# Patient Record
Sex: FEMALE | Race: WHITE | Employment: OTHER | URBAN - METROPOLITAN AREA
[De-identification: names, ages, dates, MRNs, and addresses within clinical notes are randomized per-mention and may not be internally consistent; named-entity substitution may affect disease eponyms.]

---

## 2017-04-25 ENCOUNTER — OFFICE VISIT (OUTPATIENT)
Dept: ORTHOPEDIC SURGERY | Age: 67
End: 2017-04-25

## 2017-04-25 VITALS
HEIGHT: 65 IN | WEIGHT: 125 LBS | DIASTOLIC BLOOD PRESSURE: 81 MMHG | BODY MASS INDEX: 20.83 KG/M2 | HEART RATE: 83 BPM | SYSTOLIC BLOOD PRESSURE: 149 MMHG

## 2017-04-25 DIAGNOSIS — M17.12 LOCALIZED OSTEOARTHRITIS OF LEFT KNEE: Primary | ICD-10-CM

## 2017-04-25 PROCEDURE — 20610 DRAIN/INJ JOINT/BURSA W/O US: CPT | Performed by: ORTHOPAEDIC SURGERY

## 2017-04-25 PROCEDURE — 3017F COLORECTAL CA SCREEN DOC REV: CPT | Performed by: ORTHOPAEDIC SURGERY

## 2017-04-25 PROCEDURE — 4040F PNEUMOC VAC/ADMIN/RCVD: CPT | Performed by: ORTHOPAEDIC SURGERY

## 2017-04-25 PROCEDURE — 1123F ACP DISCUSS/DSCN MKR DOCD: CPT | Performed by: ORTHOPAEDIC SURGERY

## 2017-04-25 PROCEDURE — 73564 X-RAY EXAM KNEE 4 OR MORE: CPT | Performed by: ORTHOPAEDIC SURGERY

## 2017-04-25 PROCEDURE — 3014F SCREEN MAMMO DOC REV: CPT | Performed by: ORTHOPAEDIC SURGERY

## 2017-04-25 PROCEDURE — G8400 PT W/DXA NO RESULTS DOC: HCPCS | Performed by: ORTHOPAEDIC SURGERY

## 2017-04-25 PROCEDURE — G8427 DOCREV CUR MEDS BY ELIG CLIN: HCPCS | Performed by: ORTHOPAEDIC SURGERY

## 2017-04-25 PROCEDURE — 99212 OFFICE O/P EST SF 10 MIN: CPT | Performed by: ORTHOPAEDIC SURGERY

## 2017-04-25 PROCEDURE — 1090F PRES/ABSN URINE INCON ASSESS: CPT | Performed by: ORTHOPAEDIC SURGERY

## 2017-04-25 PROCEDURE — G8419 CALC BMI OUT NRM PARAM NOF/U: HCPCS | Performed by: ORTHOPAEDIC SURGERY

## 2017-04-25 PROCEDURE — 1036F TOBACCO NON-USER: CPT | Performed by: ORTHOPAEDIC SURGERY

## 2018-06-18 ENCOUNTER — OFFICE VISIT (OUTPATIENT)
Dept: ORTHOPEDIC SURGERY | Age: 68
End: 2018-06-18

## 2018-06-18 VITALS
WEIGHT: 125 LBS | SYSTOLIC BLOOD PRESSURE: 126 MMHG | HEIGHT: 65 IN | BODY MASS INDEX: 20.83 KG/M2 | DIASTOLIC BLOOD PRESSURE: 86 MMHG | HEART RATE: 84 BPM

## 2018-06-18 DIAGNOSIS — M25.562 LEFT KNEE PAIN, UNSPECIFIED CHRONICITY: ICD-10-CM

## 2018-06-18 DIAGNOSIS — M17.12 PRIMARY OSTEOARTHRITIS OF LEFT KNEE: Primary | ICD-10-CM

## 2018-06-18 PROCEDURE — 99213 OFFICE O/P EST LOW 20 MIN: CPT | Performed by: ORTHOPAEDIC SURGERY

## 2018-06-18 PROCEDURE — 1123F ACP DISCUSS/DSCN MKR DOCD: CPT | Performed by: ORTHOPAEDIC SURGERY

## 2018-06-18 PROCEDURE — G8420 CALC BMI NORM PARAMETERS: HCPCS | Performed by: ORTHOPAEDIC SURGERY

## 2018-06-18 PROCEDURE — 3017F COLORECTAL CA SCREEN DOC REV: CPT | Performed by: ORTHOPAEDIC SURGERY

## 2018-06-18 PROCEDURE — 1036F TOBACCO NON-USER: CPT | Performed by: ORTHOPAEDIC SURGERY

## 2018-06-18 PROCEDURE — 1090F PRES/ABSN URINE INCON ASSESS: CPT | Performed by: ORTHOPAEDIC SURGERY

## 2018-06-18 PROCEDURE — 20610 DRAIN/INJ JOINT/BURSA W/O US: CPT | Performed by: ORTHOPAEDIC SURGERY

## 2018-06-18 PROCEDURE — G8427 DOCREV CUR MEDS BY ELIG CLIN: HCPCS | Performed by: ORTHOPAEDIC SURGERY

## 2018-06-18 PROCEDURE — APPNB15 APP NON BILLABLE TIME 0-15 MINS: Performed by: ORTHOPAEDIC SURGERY

## 2018-06-18 PROCEDURE — G8400 PT W/DXA NO RESULTS DOC: HCPCS | Performed by: ORTHOPAEDIC SURGERY

## 2018-06-18 PROCEDURE — 4040F PNEUMOC VAC/ADMIN/RCVD: CPT | Performed by: ORTHOPAEDIC SURGERY

## 2018-06-20 ENCOUNTER — TELEPHONE (OUTPATIENT)
Dept: ORTHOPEDIC SURGERY | Age: 68
End: 2018-06-20

## 2018-10-09 ENCOUNTER — OFFICE VISIT (OUTPATIENT)
Dept: ORTHOPEDIC SURGERY | Age: 68
End: 2018-10-09
Payer: MEDICARE

## 2018-10-09 VITALS
HEIGHT: 65 IN | DIASTOLIC BLOOD PRESSURE: 78 MMHG | WEIGHT: 125 LBS | SYSTOLIC BLOOD PRESSURE: 146 MMHG | HEART RATE: 101 BPM | BODY MASS INDEX: 20.83 KG/M2

## 2018-10-09 DIAGNOSIS — M17.12 PRIMARY OSTEOARTHRITIS OF LEFT KNEE: Primary | ICD-10-CM

## 2018-10-09 PROCEDURE — 4040F PNEUMOC VAC/ADMIN/RCVD: CPT | Performed by: ORTHOPAEDIC SURGERY

## 2018-10-09 PROCEDURE — 1036F TOBACCO NON-USER: CPT | Performed by: ORTHOPAEDIC SURGERY

## 2018-10-09 PROCEDURE — G8420 CALC BMI NORM PARAMETERS: HCPCS | Performed by: ORTHOPAEDIC SURGERY

## 2018-10-09 PROCEDURE — 1101F PT FALLS ASSESS-DOCD LE1/YR: CPT | Performed by: ORTHOPAEDIC SURGERY

## 2018-10-09 PROCEDURE — G8400 PT W/DXA NO RESULTS DOC: HCPCS | Performed by: ORTHOPAEDIC SURGERY

## 2018-10-09 PROCEDURE — 3017F COLORECTAL CA SCREEN DOC REV: CPT | Performed by: ORTHOPAEDIC SURGERY

## 2018-10-09 PROCEDURE — G8427 DOCREV CUR MEDS BY ELIG CLIN: HCPCS | Performed by: ORTHOPAEDIC SURGERY

## 2018-10-09 PROCEDURE — G8484 FLU IMMUNIZE NO ADMIN: HCPCS | Performed by: ORTHOPAEDIC SURGERY

## 2018-10-09 PROCEDURE — 1123F ACP DISCUSS/DSCN MKR DOCD: CPT | Performed by: ORTHOPAEDIC SURGERY

## 2018-10-09 PROCEDURE — 99212 OFFICE O/P EST SF 10 MIN: CPT | Performed by: ORTHOPAEDIC SURGERY

## 2018-10-09 PROCEDURE — 1090F PRES/ABSN URINE INCON ASSESS: CPT | Performed by: ORTHOPAEDIC SURGERY

## 2018-11-05 NOTE — PROGRESS NOTES
Douglas Ortiz  [id number]  Encounter Date: 10/9/2018  YOB: 1950    Chief Complaint   Patient presents with    Knee Pain     f/u for LT knee pain/osteoarthritis        History:Ms. Douglas Ortiz is here in follow up regarding her left knee arthritis. She last underwent a Synvisc injection in June of this year. Pain is started return and can increase up to an 8/10 with fleeting sharp pain with activities. More to the posterior medial aspect of the knee with flexion. She denies any catching or locking symptoms. Exam:  BP (!) 146/78   Pulse 101   Ht 5' 5\" (1.651 m)   Wt 125 lb (56.7 kg)   BMI 20.80 kg/m²   General: Alert and oriented x3, No acute distress, Cooperative and conversant  On examination of patient's left knee there is no obvious swelling or joint effusion. She has mild tenderness on palpation of both the medial and lateral joint lines and with lateral tracking of the patella. There is mild patellofemoral crepitus. She does not lack range of motion. She has good strength. Gross motor function light touch sensation intact throughout her left lower extremity. There is no edema or erythema in the affected extremity. Assessment:   Diagnosis Orders   1. Primary osteoarthritis of left knee         Plan:  Again she has underlying arthritic changes and is status post arthroscopy with partial meniscectomy from November 2015. She continues to remain active working out 3-5 times per week. We discussed use of combination anti-inflammatory and pain cream to help bridge her symptoms until she could repeat her joint fluid injections in December. She will give this a try. Otherwise she'll continue to titrate her activities as she tolerates. She will follow back as needed. She understands and accepts this course of care.

## 2020-11-17 NOTE — PROGRESS NOTES
[facility] / [facility] ([facility]) [address], [place]    Acknowledgment of Informed Consent for Surgical or Medical Procedure and Sedation  I agree to allow doctor(s) [facility] and his/her associates or assistants, including residents and/or other qualified medical practitioner to perform the following medical treatment or procedure and to administer or direct the administration of sedation as necessary:  Procedure(s): RIGHT L4-5 LATERAL LUMBAR INTERBODY FUSION WITH PEDICLE SCREW FIXATION  My doctor has explained the following regarding the proposed procedure:   the explanation of the procedure   the benefits of the procedure   the potential problems that might occur during recuperation   the risks and side effects of the procedure which could include but are not limited to severe blood loss, infection, stroke or death   the benefits, risks and side effect of alternative procedures including the consequences of declining this procedure or any alternative procedures   the likelihood of achieving satisfactory results. I acknowledge no guarantee or assurance has been made to me regarding the results. I understand that during the course of this treatment/procedure, unforeseen conditions can occur which require an additional or different procedure. I agree to allow my physician or assistants to perform such extension of the original procedure as they may find necessary. I understand that sedation will often result in temporary impairment of memory and fine motor skills and that sedation can occasionally progress to a state of deep sedation or general anesthesia. I understand the risks of anesthesia for surgery include, but are not limited to, sore throat, hoarseness, injury to face, mouth, or teeth; nausea; headache; injury to blood vessels or nerves; death, brain damage, or paralysis.     I understand that if I have a Limitation of Treatment order in effect during my hospitalization, the order may or may not be in effect during this procedure. I give my doctor permission to give me blood or blood products. I understand that there are risks with receiving blood such as hepatitis, AIDS, fever, or allergic reaction. I acknowledge that the risks, benefits, and alternatives of this treatment have been explained to me and that no express or implied warranty has been given by the hospital, any blood bank, or any person or entity as to the blood or blood components transfused. At the discretion of my doctor, I agree to allow observers, equipment/product representatives and allow photographing, and/or televising of the procedure, provided my name or identity is maintained confidentially. I agree the hospital may dispose of or use for scientific or educational purposes any tissue, fluid, or body parts which may be removed.     ________________________________Date________Time______ am/pm  ([place] [facility])  Patient or Signature of Closest Relative or Legal Guardian    ________________________________Date________Time______am/pm      Page 1 of  1  Witness

## 2020-11-18 ENCOUNTER — NURSE ONLY (OUTPATIENT)
Dept: PRIMARY CARE CLINIC | Age: 70
End: 2020-11-18
Payer: MEDICARE

## 2020-11-18 LAB — SARS-COV-2: NOT DETECTED

## 2020-11-18 PROCEDURE — 99211 OFF/OP EST MAY X REQ PHY/QHP: CPT | Performed by: NURSE PRACTITIONER

## 2020-11-20 LAB — CULTURE NOSE: NORMAL

## 2020-11-20 NOTE — PROGRESS NOTES
Discharge planning:  After discharged from hospital patient will return home with , Aliciahailee Spencer.

## 2020-11-20 NOTE — PROGRESS NOTES
[facility] patients having surgery or anesthesia are required to be Covid tested. You will need to quarantined from the time you are tested until your surgery. PRIOR TO PROCEDURE DATE:  1. Please follow any guidelines/instructions prior to your procedure as advised by your surgeon. 2. Arrange for someone to drive you home and be with you for the first 24 hours after discharge for your safety after your procedure for which you received sedation. Ensure it is someone we can share information with regarding your discharge. 3. You must contact your surgeon for instructions IF:   You are taking any blood thinners, aspirin, anti-inflammatory or vitamin E.   There is a change in your physical condition such as a cold, fever, rash, cuts, sores or any other infection, especially near your surgical site. 4. Do not drink alcohol the day before or day of your procedure. 5. A Pre-op History and Physical for surgery MUST be completed by your Physician or Urgent Care within 30 days of your procedure date. Please bring a copy with you on the day of your procedure and along with any other testing performed. THE DAY OF YOUR PROCEDURE:  1. Follow instructions for ARRIVAL TIME as DIRECTED BY YOUR SURGEON. I    2. Enter the MAIN entrance from [employer] and follow the signs to the free [facility] or [employer] parking (offered free of charge 6am-5pm). 3. Enter the Main Entrance of the hospital (do not enter from the lower level of the parking garage). Upon entrance, check in with the  at the main desk on your left. If no one is available at the desk, proceed into the [facility] Waiting Room and go through the door directly into the [facility]. There is a Check-in desk ACROSS from Room 5 (marked with a sign hanging from the ceiling). The phone number for the surgery center is [phone].     4. Please call [phone] option #2 option #2 if you have not been preregistered yet. On the day of your procedure bring your insurance card and photo ID. You will be registered at your bedside once brought back to your room. 5. DO NOT EAT ANYTHING eight hours prior to surgery. May have 8 ounces of water 4 hours prior to surgery. 6. MEDICATIONS    Take the following medications with a SMALL sip of water: tylenol    Use your usual dose of inhalers the morning of surgery. BRING your rescue inhaler with you to hospital.    Anesthesia does NOT want you to take insulin the morning of surgery. They will control your blood sugar while you are at the hospital. Please contact your ordering physician for instructions regarding your insulin the night before your procedure. If you have an insulin pump, please keep it set on basal rate. 7. Do not swallow water when brushing teeth. No gum, candy, mints or ice chips. Refrain from smoking or at least decrease the amount. 8. Dress in loose, comfortable clothing appropriate for redressing after your procedure. Do not wear jewelry (including body piercings), make-up (especially NO eye make-up), fingernail polish (NO toenail polish if foot/leg surgery), lotion, powders or metal hairclips. 9. Dentures, glasses, or contacts will need to be removed before your procedure. Bring cases for your glasses, contacts, dentures, or hearing aids to protect them while you are in surgery. 10. If you use a CPAP, please bring it with you on the day of your procedure. 11. We recommend that valuable personal  belongings such as cash, cell phones, e-tablets or jewelry, be left at home during your stay. The hospital will not be responsible for valuables that are not secured in the hospital safe. However, if your insurance requires a co-pay, you may want to bring a method of payment, i.e. Check or credit card, if you wish to pay your co-pay the day of surgery.       12. If you are to stay overnight, you may bring a bag with personal items. Please have any large items you may need brought in by your family after your arrival to your hospital room. 15. If you have a Living Will or Durable Power of , please bring a copy on the day of your procedure. 15. With your permission, one family member may accompany you while you are being prepared for surgery. Once you are ready, additional family members may join you. HOW WE KEEP YOU SAFE and WORK TO PREVENT SURGICAL SITE INFECTIONS:  1. Health care workers should always check your ID bracelet to verify your name and birth date. You will be asked many times to state your name, date of birth, and allergies. 2. Health care workers should always clean their hands with soap or alcohol gel before providing care to you. It is okay to ask anyone if they cleaned their hands before they touch you. 3. You will be actively involved in verifying the type of procedure you are having and ensuring the correct surgical site. This will be confirmed multiple times prior to your procedure. Do NOT maci your surgery site UNLESS instructed to by your surgeon. 4. Do not shave or wax for 72 hours prior to procedure near your operative site. Shaving with a razor can irritate your skin and make it easier to develop an infection. On the day of your procedure, any hair that needs to be removed near the surgical site will be clipped by a healthcare worker using a special clippers designed to avoid skin irritation. 5. When you are in the operating room, your surgical site will be cleansed with a special soap, and in most cases, you will be given an antibiotic before the surgery begins. What to expect AFTER YOUR PROCEDURE:  1. Immediately following your procedure, your will be taken to the PACU for the first phase of your recovery.   Your nurse will help you recover from any potential side effects of anesthesia, such as extreme drowsiness, changes in your vital signs or breathing patterns. Nausea, headache, muscle aches, or sore throat may also occur after anesthesia. Your nurse will help you manage these potential side effects. 2. For comfort and safety, arrange to have someone at home with you for the first 24 hours after discharge. 3. You and your family will be given written instructions about your diet, activity, dressing care, medications, and return visits. 4. Once at home, should issues with nausea, pain, or bleeding occur, or should you notice any signs of infection, you should call your surgeon. 5. Always clean your hands before and after caring for your wound. Do not let your family touch your surgery site without cleaning their hands. 6. Narcotic pain medications can cause significant constipation. You may want to add a stool softener to your postoperative medication schedule or speak to your surgeon on how best to manage this SIDE EFFECT. SPECIAL INSTRUCTIONS please call surgeon's office for arrival time and surgery time. Informed no one can be with her while preparing for surgery, may wait in waiting room however limited space and may be asked to wait in the car. Reviewed current restricted visitation with patient 1 hour 1 person can visit once in room but then no visitors thereafter. May bring small bag with personal items. Patient was not given a brace to bring in. Patient acknowledges understanding of instructions. Thank you for allowing us to care for you. We strive to exceed your expectations in the delivery of care and service provided to you and your family. If you need to contact us for any reason, please call us at [phone]    Instructions reviewed with patient during preadmission testing phone interview. Inna Pradhan. 11/20/2020 .12:40 PM      ADDITIONAL EDUCATIONAL INFORMATION REVIEWED PER PHONE WITH YOU AND/OR YOUR FAMILY:  No Bring a urine sample on day of surgery  Yes Hibiclens®

## 2020-11-20 NOTE — PROGRESS NOTES
Ekg tracing requested from Dr. Darrius Mendoza office spoke with [facility] and our fax number was given.

## 2020-11-23 ENCOUNTER — ANESTHESIA EVENT (OUTPATIENT)
Dept: OPERATING ROOM | Age: 70
DRG: 455 | End: 2020-11-23
Payer: MEDICARE

## 2020-11-24 ENCOUNTER — HOSPITAL ENCOUNTER (INPATIENT)
Age: 70
LOS: 3 days | Discharge: HOME | DRG: 455 | End: 2020-11-27
Attending: NEUROLOGICAL SURGERY | Admitting: NEUROLOGICAL SURGERY
Payer: MEDICARE

## 2020-11-24 ENCOUNTER — APPOINTMENT (OUTPATIENT)
Dept: GENERAL RADIOLOGY | Age: 70
DRG: 455 | End: 2020-11-24
Attending: NEUROLOGICAL SURGERY
Payer: MEDICARE

## 2020-11-24 ENCOUNTER — ANESTHESIA (OUTPATIENT)
Dept: OPERATING ROOM | Age: 70
DRG: 455 | End: 2020-11-24
Payer: MEDICARE

## 2020-11-24 VITALS — SYSTOLIC BLOOD PRESSURE: 94 MMHG | DIASTOLIC BLOOD PRESSURE: 52 MMHG | OXYGEN SATURATION: 100 % | TEMPERATURE: 94.6 F

## 2020-11-24 PROBLEM — M43.16 SPONDYLOLISTHESIS OF LUMBAR REGION: Status: ACTIVE | Noted: 2020-11-24

## 2020-11-24 LAB
ABO/RH: NORMAL
ANTIBODY SCREEN: NORMAL
APTT: 33.9 SEC (ref 24.2–36.2)
BASOPHILS ABSOLUTE: 0 K/UL (ref 0–0.2)
BASOPHILS RELATIVE PERCENT: 0.3 %
EOSINOPHILS ABSOLUTE: 0 K/UL (ref 0–0.6)
EOSINOPHILS RELATIVE PERCENT: 0.5 %
GLUCOSE BLD-MCNC: 95 MG/DL (ref 70–99)
HCT VFR BLD CALC: 44.9 % (ref 36–48)
HEMOGLOBIN: 15 G/DL (ref 12–16)
INR BLD: 0.94 (ref 0.86–1.14)
LYMPHOCYTES ABSOLUTE: 1.3 K/UL (ref 1–5.1)
LYMPHOCYTES RELATIVE PERCENT: 20.9 %
MCH RBC QN AUTO: 30.5 PG (ref 26–34)
MCHC RBC AUTO-ENTMCNC: 33.4 G/DL (ref 31–36)
MCV RBC AUTO: 91.4 FL (ref 80–100)
MONOCYTES ABSOLUTE: 0.4 K/UL (ref 0–1.3)
MONOCYTES RELATIVE PERCENT: 6 %
NEUTROPHILS ABSOLUTE: 4.4 K/UL (ref 1.7–7.7)
NEUTROPHILS RELATIVE PERCENT: 72.3 %
PDW BLD-RTO: 12.8 % (ref 12.4–15.4)
PERFORMED ON: NORMAL
PLATELET # BLD: 212 K/UL (ref 135–450)
PMV BLD AUTO: 7.2 FL (ref 5–10.5)
PROTHROMBIN TIME: 10.9 SEC (ref 10–13.2)
RBC # BLD: 4.92 M/UL (ref 4–5.2)
WBC # BLD: 6.1 K/UL (ref 4–11)

## 2020-11-24 PROCEDURE — 3600000014 HC SURGERY LEVEL 4 ADDTL 15MIN: Performed by: NEUROLOGICAL SURGERY

## 2020-11-24 PROCEDURE — 3209999900 FLUORO FOR SURGICAL PROCEDURES

## 2020-11-24 PROCEDURE — 7100000000 HC PACU RECOVERY - FIRST 15 MIN: Performed by: NEUROLOGICAL SURGERY

## 2020-11-24 PROCEDURE — 2580000003 HC RX 258: Performed by: ANESTHESIOLOGY

## 2020-11-24 PROCEDURE — 2500000003 HC RX 250 WO HCPCS: Performed by: NURSE ANESTHETIST, CERTIFIED REGISTERED

## 2020-11-24 PROCEDURE — 72100 X-RAY EXAM L-S SPINE 2/3 VWS: CPT

## 2020-11-24 PROCEDURE — 1200000000 HC SEMI PRIVATE

## 2020-11-24 PROCEDURE — 86900 BLOOD TYPING SEROLOGIC ABO: CPT

## 2020-11-24 PROCEDURE — 6360000002 HC RX W HCPCS: Performed by: ANESTHESIOLOGY

## 2020-11-24 PROCEDURE — 6360000002 HC RX W HCPCS: Performed by: NEUROLOGICAL SURGERY

## 2020-11-24 PROCEDURE — 3700000001 HC ADD 15 MINUTES (ANESTHESIA): Performed by: NEUROLOGICAL SURGERY

## 2020-11-24 PROCEDURE — 0SG00AJ FUSION OF LUMBAR VERTEBRAL JOINT WITH INTERBODY FUSION DEVICE, POSTERIOR APPROACH, ANTERIOR COLUMN, OPEN APPROACH: ICD-10-PCS | Performed by: NEUROLOGICAL SURGERY

## 2020-11-24 PROCEDURE — 2580000003 HC RX 258: Performed by: NEUROLOGICAL SURGERY

## 2020-11-24 PROCEDURE — 2500000003 HC RX 250 WO HCPCS: Performed by: NEUROLOGICAL SURGERY

## 2020-11-24 PROCEDURE — 3700000000 HC ANESTHESIA ATTENDED CARE: Performed by: NEUROLOGICAL SURGERY

## 2020-11-24 PROCEDURE — 85730 THROMBOPLASTIN TIME PARTIAL: CPT

## 2020-11-24 PROCEDURE — 6360000002 HC RX W HCPCS: Performed by: NURSE ANESTHETIST, CERTIFIED REGISTERED

## 2020-11-24 PROCEDURE — 0SG00K1 FUSION OF LUMBAR VERTEBRAL JOINT WITH NONAUTOLOGOUS TISSUE SUBSTITUTE, POSTERIOR APPROACH, POSTERIOR COLUMN, OPEN APPROACH: ICD-10-PCS | Performed by: NEUROLOGICAL SURGERY

## 2020-11-24 PROCEDURE — C9290 INJ, BUPIVACAINE LIPOSOME: HCPCS | Performed by: NEUROLOGICAL SURGERY

## 2020-11-24 PROCEDURE — 2780000010 HC IMPLANT OTHER: Performed by: NEUROLOGICAL SURGERY

## 2020-11-24 PROCEDURE — 3600000004 HC SURGERY LEVEL 4 BASE: Performed by: NEUROLOGICAL SURGERY

## 2020-11-24 PROCEDURE — 7100000001 HC PACU RECOVERY - ADDTL 15 MIN: Performed by: NEUROLOGICAL SURGERY

## 2020-11-24 PROCEDURE — 86901 BLOOD TYPING SEROLOGIC RH(D): CPT

## 2020-11-24 PROCEDURE — C1713 ANCHOR/SCREW BN/BN,TIS/BN: HCPCS | Performed by: NEUROLOGICAL SURGERY

## 2020-11-24 PROCEDURE — 4A11X4G MONITORING OF PERIPHERAL NERVOUS ELECTRICAL ACTIVITY, INTRAOPERATIVE, EXTERNAL APPROACH: ICD-10-PCS | Performed by: NEUROLOGICAL SURGERY

## 2020-11-24 PROCEDURE — C1762 CONN TISS, HUMAN(INC FASCIA): HCPCS | Performed by: NEUROLOGICAL SURGERY

## 2020-11-24 PROCEDURE — 0SG0071 FUSION OF LUMBAR VERTEBRAL JOINT WITH AUTOLOGOUS TISSUE SUBSTITUTE, POSTERIOR APPROACH, POSTERIOR COLUMN, OPEN APPROACH: ICD-10-PCS | Performed by: NEUROLOGICAL SURGERY

## 2020-11-24 PROCEDURE — 2500000003 HC RX 250 WO HCPCS: Performed by: ANESTHESIOLOGY

## 2020-11-24 PROCEDURE — 2709999900 HC NON-CHARGEABLE SUPPLY: Performed by: NEUROLOGICAL SURGERY

## 2020-11-24 PROCEDURE — 86850 RBC ANTIBODY SCREEN: CPT

## 2020-11-24 PROCEDURE — 2720000010 HC SURG SUPPLY STERILE: Performed by: NEUROLOGICAL SURGERY

## 2020-11-24 PROCEDURE — 85025 COMPLETE CBC W/AUTO DIFF WBC: CPT

## 2020-11-24 PROCEDURE — 6370000000 HC RX 637 (ALT 250 FOR IP): Performed by: NEUROLOGICAL SURGERY

## 2020-11-24 PROCEDURE — 2720000002 HC MISC SURG SUPPLY STERILE >$500: Performed by: NEUROLOGICAL SURGERY

## 2020-11-24 PROCEDURE — 85610 PROTHROMBIN TIME: CPT

## 2020-11-24 DEVICE — SCREW SPNL L50MM DIA6.5MM 2C POLYAX RELINE MAS: Type: IMPLANTABLE DEVICE | Site: SPINE LUMBAR | Status: FUNCTIONAL

## 2020-11-24 DEVICE — SCREW SPNL MOD TULIP RELINE: Type: IMPLANTABLE DEVICE | Site: SPINE LUMBAR | Status: FUNCTIONAL

## 2020-11-24 DEVICE — BONE GRAFT KIT 7510100 INFUSE X SMALL
Type: IMPLANTABLE DEVICE | Site: SPINE LUMBAR | Status: FUNCTIONAL
Brand: INFUSE® BONE GRAFT

## 2020-11-24 DEVICE — SCREW SPNL DIA5.5MM OPN TULIP LOK RELINE: Type: IMPLANTABLE DEVICE | Site: SPINE LUMBAR | Status: FUNCTIONAL

## 2020-11-24 DEVICE — GRAFT BONE M CELLULAR MTRX OSTEOCEL PRO: Type: IMPLANTABLE DEVICE | Site: SPINE LUMBAR | Status: FUNCTIONAL

## 2020-11-24 DEVICE — IMPLANTABLE DEVICE: Type: IMPLANTABLE DEVICE | Site: SPINE LUMBAR | Status: FUNCTIONAL

## 2020-11-24 DEVICE — SCREW SPNL L50MM DIA7.5MM POLYAX 2C RELINE MAS: Type: IMPLANTABLE DEVICE | Site: SPINE LUMBAR | Status: FUNCTIONAL

## 2020-11-24 DEVICE — SCREW SPNL L50MM DIA6.5MM POST THORACOLUMBOSACRAL MOD SHANK: Type: IMPLANTABLE DEVICE | Site: SPINE LUMBAR | Status: FUNCTIONAL

## 2020-11-24 DEVICE — ROD SPNL L40MM DIA5.5MM POST THORACOLUMBOSACRAL TI LORD: Type: IMPLANTABLE DEVICE | Site: SPINE LUMBAR | Status: FUNCTIONAL

## 2020-11-24 RX ORDER — DIPHENHYDRAMINE HYDROCHLORIDE 50 MG/ML
12.5 INJECTION INTRAMUSCULAR; INTRAVENOUS
Status: DISCONTINUED | OUTPATIENT
Start: 2020-11-24 | End: 2020-11-24 | Stop reason: HOSPADM

## 2020-11-24 RX ORDER — CEFAZOLIN SODIUM 2 G/50ML
2 SOLUTION INTRAVENOUS ONCE
Status: COMPLETED | OUTPATIENT
Start: 2020-11-24 | End: 2020-11-24

## 2020-11-24 RX ORDER — MAGNESIUM HYDROXIDE 1200 MG/15ML
LIQUID ORAL CONTINUOUS PRN
Status: COMPLETED | OUTPATIENT
Start: 2020-11-24 | End: 2020-11-24

## 2020-11-24 RX ORDER — OXYCODONE HYDROCHLORIDE AND ACETAMINOPHEN 5; 325 MG/1; MG/1
1 TABLET ORAL PRN
Status: DISCONTINUED | OUTPATIENT
Start: 2020-11-24 | End: 2020-11-24 | Stop reason: HOSPADM

## 2020-11-24 RX ORDER — LABETALOL 20 MG/4 ML (5 MG/ML) INTRAVENOUS SYRINGE
5 EVERY 10 MIN PRN
Status: DISCONTINUED | OUTPATIENT
Start: 2020-11-24 | End: 2020-11-24 | Stop reason: HOSPADM

## 2020-11-24 RX ORDER — PROPOFOL 10 MG/ML
INJECTION, EMULSION INTRAVENOUS CONTINUOUS PRN
Status: DISCONTINUED | OUTPATIENT
Start: 2020-11-24 | End: 2020-11-24

## 2020-11-24 RX ORDER — FENTANYL CITRATE 50 UG/ML
50 INJECTION, SOLUTION INTRAMUSCULAR; INTRAVENOUS EVERY 5 MIN PRN
Status: DISCONTINUED | OUTPATIENT
Start: 2020-11-24 | End: 2020-11-24 | Stop reason: HOSPADM

## 2020-11-24 RX ORDER — VANCOMYCIN HYDROCHLORIDE 1 G/20ML
INJECTION, POWDER, LYOPHILIZED, FOR SOLUTION INTRAVENOUS PRN
Status: DISCONTINUED | OUTPATIENT
Start: 2020-11-24 | End: 2020-11-24 | Stop reason: HOSPADM

## 2020-11-24 RX ORDER — SODIUM CHLORIDE 0.9 % (FLUSH) 0.9 %
10 SYRINGE (ML) INJECTION EVERY 12 HOURS SCHEDULED
Status: DISCONTINUED | OUTPATIENT
Start: 2020-11-24 | End: 2020-11-27 | Stop reason: HOSPADM

## 2020-11-24 RX ORDER — ACETAMINOPHEN 325 MG/1
650 TABLET ORAL EVERY 6 HOURS
Status: DISCONTINUED | OUTPATIENT
Start: 2020-11-24 | End: 2020-11-27 | Stop reason: HOSPADM

## 2020-11-24 RX ORDER — ONDANSETRON 2 MG/ML
INJECTION INTRAMUSCULAR; INTRAVENOUS PRN
Status: DISCONTINUED | OUTPATIENT
Start: 2020-11-24 | End: 2020-11-24 | Stop reason: SDUPTHER

## 2020-11-24 RX ORDER — LIDOCAINE HYDROCHLORIDE 20 MG/ML
INJECTION, SOLUTION INFILTRATION; PERINEURAL PRN
Status: DISCONTINUED | OUTPATIENT
Start: 2020-11-24 | End: 2020-11-24 | Stop reason: SDUPTHER

## 2020-11-24 RX ORDER — SODIUM CHLORIDE 0.9 % (FLUSH) 0.9 %
10 SYRINGE (ML) INJECTION EVERY 12 HOURS SCHEDULED
Status: DISCONTINUED | OUTPATIENT
Start: 2020-11-24 | End: 2020-11-24 | Stop reason: HOSPADM

## 2020-11-24 RX ORDER — MORPHINE SULFATE 2 MG/ML
2 INJECTION, SOLUTION INTRAMUSCULAR; INTRAVENOUS
Status: DISCONTINUED | OUTPATIENT
Start: 2020-11-24 | End: 2020-11-27 | Stop reason: HOSPADM

## 2020-11-24 RX ORDER — FAMOTIDINE 20 MG/1
20 TABLET, FILM COATED ORAL 2 TIMES DAILY
Status: DISCONTINUED | OUTPATIENT
Start: 2020-11-24 | End: 2020-11-27 | Stop reason: HOSPADM

## 2020-11-24 RX ORDER — SODIUM CHLORIDE 0.9 % (FLUSH) 0.9 %
10 SYRINGE (ML) INJECTION PRN
Status: DISCONTINUED | OUTPATIENT
Start: 2020-11-24 | End: 2020-11-27 | Stop reason: HOSPADM

## 2020-11-24 RX ORDER — OXYCODONE HYDROCHLORIDE AND ACETAMINOPHEN 5; 325 MG/1; MG/1
2 TABLET ORAL PRN
Status: DISCONTINUED | OUTPATIENT
Start: 2020-11-24 | End: 2020-11-24 | Stop reason: HOSPADM

## 2020-11-24 RX ORDER — SODIUM CHLORIDE 9 MG/ML
INJECTION, SOLUTION INTRAVENOUS CONTINUOUS
Status: DISCONTINUED | OUTPATIENT
Start: 2020-11-24 | End: 2020-11-25

## 2020-11-24 RX ORDER — MIDAZOLAM HYDROCHLORIDE 1 MG/ML
INJECTION INTRAMUSCULAR; INTRAVENOUS PRN
Status: DISCONTINUED | OUTPATIENT
Start: 2020-11-24 | End: 2020-11-24 | Stop reason: SDUPTHER

## 2020-11-24 RX ORDER — PROPOFOL 10 MG/ML
INJECTION, EMULSION INTRAVENOUS PRN
Status: DISCONTINUED | OUTPATIENT
Start: 2020-11-24 | End: 2020-11-24 | Stop reason: SDUPTHER

## 2020-11-24 RX ORDER — PROMETHAZINE HYDROCHLORIDE 12.5 MG/1
12.5 TABLET ORAL EVERY 6 HOURS PRN
Status: DISCONTINUED | OUTPATIENT
Start: 2020-11-24 | End: 2020-11-27 | Stop reason: HOSPADM

## 2020-11-24 RX ORDER — DEXAMETHASONE SODIUM PHOSPHATE 4 MG/ML
INJECTION, SOLUTION INTRA-ARTICULAR; INTRALESIONAL; INTRAMUSCULAR; INTRAVENOUS; SOFT TISSUE PRN
Status: DISCONTINUED | OUTPATIENT
Start: 2020-11-24 | End: 2020-11-24 | Stop reason: SDUPTHER

## 2020-11-24 RX ORDER — PROCHLORPERAZINE EDISYLATE 5 MG/ML
5 INJECTION INTRAMUSCULAR; INTRAVENOUS
Status: DISCONTINUED | OUTPATIENT
Start: 2020-11-24 | End: 2020-11-24 | Stop reason: HOSPADM

## 2020-11-24 RX ORDER — MEPERIDINE HYDROCHLORIDE 25 MG/ML
12.5 INJECTION INTRAMUSCULAR; INTRAVENOUS; SUBCUTANEOUS EVERY 5 MIN PRN
Status: DISCONTINUED | OUTPATIENT
Start: 2020-11-24 | End: 2020-11-24 | Stop reason: HOSPADM

## 2020-11-24 RX ORDER — CEFAZOLIN SODIUM 2 G/50ML
2 SOLUTION INTRAVENOUS EVERY 8 HOURS
Status: COMPLETED | OUTPATIENT
Start: 2020-11-24 | End: 2020-11-25

## 2020-11-24 RX ORDER — ONDANSETRON 2 MG/ML
4 INJECTION INTRAMUSCULAR; INTRAVENOUS EVERY 6 HOURS PRN
Status: DISCONTINUED | OUTPATIENT
Start: 2020-11-24 | End: 2020-11-27 | Stop reason: HOSPADM

## 2020-11-24 RX ORDER — HYDRALAZINE HYDROCHLORIDE 20 MG/ML
5 INJECTION INTRAMUSCULAR; INTRAVENOUS EVERY 10 MIN PRN
Status: DISCONTINUED | OUTPATIENT
Start: 2020-11-24 | End: 2020-11-24 | Stop reason: HOSPADM

## 2020-11-24 RX ORDER — SUCCINYLCHOLINE CHLORIDE 20 MG/ML
INJECTION INTRAMUSCULAR; INTRAVENOUS PRN
Status: DISCONTINUED | OUTPATIENT
Start: 2020-11-24 | End: 2020-11-24 | Stop reason: SDUPTHER

## 2020-11-24 RX ORDER — LIDOCAINE HYDROCHLORIDE 10 MG/ML
1 INJECTION, SOLUTION EPIDURAL; INFILTRATION; INTRACAUDAL; PERINEURAL
Status: DISCONTINUED | OUTPATIENT
Start: 2020-11-24 | End: 2020-11-24 | Stop reason: HOSPADM

## 2020-11-24 RX ORDER — MORPHINE SULFATE 2 MG/ML
4 INJECTION, SOLUTION INTRAMUSCULAR; INTRAVENOUS
Status: DISCONTINUED | OUTPATIENT
Start: 2020-11-24 | End: 2020-11-27 | Stop reason: HOSPADM

## 2020-11-24 RX ORDER — SODIUM CHLORIDE, SODIUM LACTATE, POTASSIUM CHLORIDE, CALCIUM CHLORIDE 600; 310; 30; 20 MG/100ML; MG/100ML; MG/100ML; MG/100ML
INJECTION, SOLUTION INTRAVENOUS CONTINUOUS
Status: DISCONTINUED | OUTPATIENT
Start: 2020-11-24 | End: 2020-11-25

## 2020-11-24 RX ORDER — PROPOFOL 10 MG/ML
INJECTION, EMULSION INTRAVENOUS CONTINUOUS PRN
Status: DISCONTINUED | OUTPATIENT
Start: 2020-11-24 | End: 2020-11-24 | Stop reason: SDUPTHER

## 2020-11-24 RX ORDER — OXYCODONE HYDROCHLORIDE 5 MG/1
10 TABLET ORAL EVERY 4 HOURS PRN
Status: DISCONTINUED | OUTPATIENT
Start: 2020-11-24 | End: 2020-11-27 | Stop reason: HOSPADM

## 2020-11-24 RX ORDER — OXYCODONE HYDROCHLORIDE 5 MG/1
5 TABLET ORAL EVERY 4 HOURS PRN
Status: DISCONTINUED | OUTPATIENT
Start: 2020-11-24 | End: 2020-11-27 | Stop reason: HOSPADM

## 2020-11-24 RX ORDER — FENTANYL CITRATE 50 UG/ML
INJECTION, SOLUTION INTRAMUSCULAR; INTRAVENOUS PRN
Status: DISCONTINUED | OUTPATIENT
Start: 2020-11-24 | End: 2020-11-24 | Stop reason: SDUPTHER

## 2020-11-24 RX ORDER — METHOCARBAMOL 750 MG/1
1500 TABLET, FILM COATED ORAL EVERY 8 HOURS PRN
Status: DISCONTINUED | OUTPATIENT
Start: 2020-11-24 | End: 2020-11-25

## 2020-11-24 RX ORDER — SODIUM CHLORIDE 0.9 % (FLUSH) 0.9 %
10 SYRINGE (ML) INJECTION PRN
Status: DISCONTINUED | OUTPATIENT
Start: 2020-11-24 | End: 2020-11-24 | Stop reason: HOSPADM

## 2020-11-24 RX ORDER — B-COMPLEX WITH VITAMIN C
1 TABLET ORAL 2 TIMES DAILY
Status: DISCONTINUED | OUTPATIENT
Start: 2020-11-24 | End: 2020-11-27 | Stop reason: HOSPADM

## 2020-11-24 RX ORDER — GLYCOPYRROLATE 1 MG/5 ML
SYRINGE (ML) INTRAVENOUS PRN
Status: DISCONTINUED | OUTPATIENT
Start: 2020-11-24 | End: 2020-11-24 | Stop reason: SDUPTHER

## 2020-11-24 RX ORDER — FENTANYL CITRATE 50 UG/ML
25 INJECTION, SOLUTION INTRAMUSCULAR; INTRAVENOUS EVERY 5 MIN PRN
Status: DISCONTINUED | OUTPATIENT
Start: 2020-11-24 | End: 2020-11-24 | Stop reason: HOSPADM

## 2020-11-24 RX ORDER — ONDANSETRON 2 MG/ML
4 INJECTION INTRAMUSCULAR; INTRAVENOUS
Status: DISCONTINUED | OUTPATIENT
Start: 2020-11-24 | End: 2020-11-24 | Stop reason: HOSPADM

## 2020-11-24 RX ADMIN — ONDANSETRON 4 MG: 2 INJECTION INTRAMUSCULAR; INTRAVENOUS at 11:39

## 2020-11-24 RX ADMIN — PROPOFOL 100 MCG/KG/MIN: 10 INJECTION, EMULSION INTRAVENOUS at 11:32

## 2020-11-24 RX ADMIN — METHOCARBAMOL 1000 MG: 100 INJECTION, SOLUTION INTRAMUSCULAR; INTRAVENOUS at 15:57

## 2020-11-24 RX ADMIN — REMIFENTANIL HYDROCHLORIDE 0.1 MCG/KG/MIN: 1 INJECTION, POWDER, LYOPHILIZED, FOR SOLUTION INTRAVENOUS at 11:31

## 2020-11-24 RX ADMIN — MEPERIDINE HYDROCHLORIDE 12.5 MG: 25 INJECTION, SOLUTION INTRAMUSCULAR; INTRAVENOUS; SUBCUTANEOUS at 14:52

## 2020-11-24 RX ADMIN — SODIUM CHLORIDE, SODIUM LACTATE, POTASSIUM CHLORIDE, AND CALCIUM CHLORIDE: 600; 310; 30; 20 INJECTION, SOLUTION INTRAVENOUS at 08:45

## 2020-11-24 RX ADMIN — FENTANYL CITRATE 100 MCG: 50 INJECTION INTRAMUSCULAR; INTRAVENOUS at 11:28

## 2020-11-24 RX ADMIN — SUCCINYLCHOLINE CHLORIDE 120 MG: 20 INJECTION, SOLUTION INTRAMUSCULAR; INTRAVENOUS; PARENTERAL at 11:31

## 2020-11-24 RX ADMIN — SODIUM CHLORIDE, SODIUM LACTATE, POTASSIUM CHLORIDE, AND CALCIUM CHLORIDE: 600; 310; 30; 20 INJECTION, SOLUTION INTRAVENOUS at 13:46

## 2020-11-24 RX ADMIN — SODIUM CHLORIDE, SODIUM LACTATE, POTASSIUM CHLORIDE, AND CALCIUM CHLORIDE: 600; 310; 30; 20 INJECTION, SOLUTION INTRAVENOUS at 09:08

## 2020-11-24 RX ADMIN — ACETAMINOPHEN 650 MG: 325 TABLET ORAL at 18:32

## 2020-11-24 RX ADMIN — LIDOCAINE HYDROCHLORIDE 100 MG: 20 INJECTION, SOLUTION INFILTRATION; PERINEURAL at 14:10

## 2020-11-24 RX ADMIN — CEFAZOLIN SODIUM 2 G: 2 SOLUTION INTRAVENOUS at 11:39

## 2020-11-24 RX ADMIN — HYDROMORPHONE HYDROCHLORIDE 0.5 MG: 1 INJECTION, SOLUTION INTRAMUSCULAR; INTRAVENOUS; SUBCUTANEOUS at 13:39

## 2020-11-24 RX ADMIN — OXYCODONE HYDROCHLORIDE 5 MG: 5 TABLET ORAL at 23:07

## 2020-11-24 RX ADMIN — METHOCARBAMOL 1500 MG: 750 TABLET ORAL at 20:51

## 2020-11-24 RX ADMIN — DEXAMETHASONE SODIUM PHOSPHATE 4 MG: 4 INJECTION, SOLUTION INTRAMUSCULAR; INTRAVENOUS at 11:39

## 2020-11-24 RX ADMIN — Medication 0.2 MG: at 11:44

## 2020-11-24 RX ADMIN — HYDROMORPHONE HYDROCHLORIDE 0.5 MG: 1 INJECTION, SOLUTION INTRAMUSCULAR; INTRAVENOUS; SUBCUTANEOUS at 13:35

## 2020-11-24 RX ADMIN — CALCIUM CARBONATE-VITAMIN D TAB 500 MG-200 UNIT 1 TABLET: 500-200 TAB at 20:53

## 2020-11-24 RX ADMIN — SODIUM CHLORIDE, SODIUM LACTATE, POTASSIUM CHLORIDE, AND CALCIUM CHLORIDE: 600; 310; 30; 20 INJECTION, SOLUTION INTRAVENOUS at 13:51

## 2020-11-24 RX ADMIN — LIDOCAINE HYDROCHLORIDE 50 MG: 20 INJECTION, SOLUTION INFILTRATION; PERINEURAL at 13:01

## 2020-11-24 RX ADMIN — MIDAZOLAM HYDROCHLORIDE 2 MG: 2 INJECTION, SOLUTION INTRAMUSCULAR; INTRAVENOUS at 11:28

## 2020-11-24 RX ADMIN — CEFAZOLIN SODIUM 2 G: 2 SOLUTION INTRAVENOUS at 19:02

## 2020-11-24 RX ADMIN — FAMOTIDINE 20 MG: 20 TABLET ORAL at 20:51

## 2020-11-24 RX ADMIN — Medication 0.2 MG: at 13:19

## 2020-11-24 RX ADMIN — PROPOFOL 150 MG: 10 INJECTION, EMULSION INTRAVENOUS at 11:30

## 2020-11-24 ASSESSMENT — PULMONARY FUNCTION TESTS
PIF_VALUE: 13
PIF_VALUE: 14
PIF_VALUE: 13
PIF_VALUE: 12
PIF_VALUE: 21
PIF_VALUE: 13
PIF_VALUE: 13
PIF_VALUE: 12
PIF_VALUE: 13
PIF_VALUE: 15
PIF_VALUE: 12
PIF_VALUE: 13
PIF_VALUE: 13
PIF_VALUE: 0
PIF_VALUE: 13
PIF_VALUE: 15
PIF_VALUE: 2
PIF_VALUE: 14
PIF_VALUE: 13
PIF_VALUE: 12
PIF_VALUE: 1
PIF_VALUE: 13
PIF_VALUE: 0
PIF_VALUE: 12
PIF_VALUE: 16
PIF_VALUE: 13
PIF_VALUE: 1
PIF_VALUE: 13
PIF_VALUE: 11
PIF_VALUE: 14
PIF_VALUE: 13
PIF_VALUE: 13
PIF_VALUE: 9
PIF_VALUE: 12
PIF_VALUE: 15
PIF_VALUE: 1
PIF_VALUE: 25
PIF_VALUE: 13
PIF_VALUE: 1
PIF_VALUE: 4
PIF_VALUE: 11
PIF_VALUE: 13
PIF_VALUE: 15
PIF_VALUE: 12
PIF_VALUE: 13
PIF_VALUE: 13
PIF_VALUE: 12
PIF_VALUE: 13
PIF_VALUE: 14
PIF_VALUE: 12
PIF_VALUE: 13
PIF_VALUE: 14
PIF_VALUE: 11
PIF_VALUE: 13
PIF_VALUE: 1
PIF_VALUE: 11
PIF_VALUE: 13
PIF_VALUE: 15
PIF_VALUE: 12
PIF_VALUE: 19
PIF_VALUE: 12
PIF_VALUE: 13
PIF_VALUE: 13
PIF_VALUE: 16
PIF_VALUE: 11
PIF_VALUE: 14
PIF_VALUE: 14
PIF_VALUE: 12
PIF_VALUE: 11
PIF_VALUE: 11
PIF_VALUE: 15
PIF_VALUE: 13
PIF_VALUE: 12
PIF_VALUE: 14
PIF_VALUE: 13
PIF_VALUE: 12
PIF_VALUE: 13
PIF_VALUE: 14
PIF_VALUE: 13
PIF_VALUE: 14
PIF_VALUE: 14
PIF_VALUE: 10
PIF_VALUE: 13
PIF_VALUE: 14
PIF_VALUE: 15
PIF_VALUE: 14
PIF_VALUE: 13
PIF_VALUE: 14
PIF_VALUE: 11
PIF_VALUE: 12
PIF_VALUE: 13
PIF_VALUE: 8
PIF_VALUE: 12
PIF_VALUE: 13
PIF_VALUE: 12
PIF_VALUE: 15
PIF_VALUE: 13
PIF_VALUE: 14
PIF_VALUE: 13
PIF_VALUE: 11
PIF_VALUE: 14
PIF_VALUE: 13
PIF_VALUE: 1
PIF_VALUE: 23
PIF_VALUE: 1
PIF_VALUE: 13
PIF_VALUE: 15
PIF_VALUE: 1
PIF_VALUE: 12
PIF_VALUE: 1
PIF_VALUE: 11
PIF_VALUE: 13
PIF_VALUE: 12
PIF_VALUE: 13
PIF_VALUE: 14
PIF_VALUE: 15
PIF_VALUE: 14
PIF_VALUE: 13
PIF_VALUE: 15
PIF_VALUE: 13
PIF_VALUE: 2
PIF_VALUE: 14
PIF_VALUE: 5
PIF_VALUE: 13
PIF_VALUE: 2
PIF_VALUE: 12
PIF_VALUE: 11
PIF_VALUE: 13
PIF_VALUE: 11
PIF_VALUE: 13
PIF_VALUE: 13
PIF_VALUE: 24
PIF_VALUE: 11
PIF_VALUE: 13
PIF_VALUE: 12
PIF_VALUE: 12
PIF_VALUE: 13
PIF_VALUE: 13
PIF_VALUE: 11
PIF_VALUE: 13
PIF_VALUE: 13
PIF_VALUE: 23
PIF_VALUE: 13
PIF_VALUE: 12
PIF_VALUE: 13
PIF_VALUE: 1
PIF_VALUE: 13
PIF_VALUE: 12
PIF_VALUE: 15
PIF_VALUE: 13
PIF_VALUE: 14
PIF_VALUE: 13
PIF_VALUE: 11
PIF_VALUE: 1
PIF_VALUE: 13
PIF_VALUE: 14
PIF_VALUE: 14
PIF_VALUE: 2
PIF_VALUE: 14
PIF_VALUE: 9

## 2020-11-24 ASSESSMENT — PAIN SCALES - GENERAL
PAINLEVEL_OUTOF10: 8
PAINLEVEL_OUTOF10: 4
PAINLEVEL_OUTOF10: 6

## 2020-11-24 ASSESSMENT — PAIN DESCRIPTION - ONSET: ONSET: ON-GOING

## 2020-11-24 ASSESSMENT — PAIN DESCRIPTION - DESCRIPTORS: DESCRIPTORS: ACHING

## 2020-11-24 ASSESSMENT — PAIN DESCRIPTION - LOCATION: LOCATION: BACK

## 2020-11-24 ASSESSMENT — PAIN - FUNCTIONAL ASSESSMENT
PAIN_FUNCTIONAL_ASSESSMENT: PREVENTS OR INTERFERES SOME ACTIVE ACTIVITIES AND ADLS
PAIN_FUNCTIONAL_ASSESSMENT: 0-10

## 2020-11-24 ASSESSMENT — PAIN DESCRIPTION - ORIENTATION: ORIENTATION: MID;LOWER

## 2020-11-24 ASSESSMENT — PAIN DESCRIPTION - PAIN TYPE: TYPE: SURGICAL PAIN

## 2020-11-24 ASSESSMENT — PAIN DESCRIPTION - PROGRESSION: CLINICAL_PROGRESSION: NOT CHANGED

## 2020-11-24 ASSESSMENT — PAIN DESCRIPTION - FREQUENCY: FREQUENCY: CONTINUOUS

## 2020-11-24 NOTE — ANESTHESIA POSTPROCEDURE EVALUATION
Department of Anesthesiology  Postprocedure Note    Patient: Pete Cavazos  MRN: [id number]  [place]: 1950  Date of evaluation: 11/24/2020  Time:  3:54 PM     Procedure Summary     Date:  11/24/20 Room / Location:  [facility]    Anesthesia Start:  2938 Anesthesia Stop:  7174    Procedure:  RIGHT L4-5 LATERAL LUMBAR INTERBODY FUSION WITH PEDICLE SCREW FIXATION (Right ) Diagnosis:       Spinal stenosis, lumbar region with neurogenic claudication      Spondylolisthesis of lumbar region      (Spondylolisthesis of lumbar region [M43.16] Spinal stenosis, lumbar region with neurogenic claudication [T59.146])    Surgeon:  Sarah Glass MD Responsible Provider:  Gerber Garcia DO    Anesthesia Type:  general ASA Status:  2          Anesthesia Type: general    Deysi Phase I: Deysi Score: 6    Deysi Phase II:      Last vitals: Reviewed and per EMR flowsheets.        Anesthesia Post Evaluation    Patient location during evaluation: PACU  Patient participation: complete - patient participated  Level of consciousness: awake  Pain score: 4  Airway patency: patent  Nausea & Vomiting: no nausea and no vomiting  Complications: no  Cardiovascular status: blood pressure returned to baseline  Respiratory status: acceptable  Hydration status: euvolemic

## 2020-11-24 NOTE — ANESTHESIA PRE PROCEDURE
Department of Anesthesiology  Preprocedure Note       Name:  Shan Ling   Age:  79 y.o.  :  1950                                          MRN:  [id number]         Date:  2020      Surgeon: Jordan Romeo):  Jez Morris MD    Procedure: Procedure(s):  RIGHT L4-5 LATERAL LUMBAR INTERBODY FUSION WITH PEDICLE SCREW FIXATION  . Medications prior to admission:   Prior to Admission medications    Medication Sig Start Date End Date Taking?  Authorizing Provider   Multiple Vitamin (MULTIVITAMIN PO) Take by mouth    Historical Provider, MD   Calcium Carbonate-Vitamin D 300-100 MG-UNIT CAPS Take 1 tablet by mouth 2 times daily     Historical Provider, MD       Current medications:    Current Facility-Administered Medications   Medication Dose Route Frequency Provider Last Rate Last Dose    ceFAZolin (ANCEF) 2 g in dextrose 3 % 50 mL IVPB (duplex)  2 g Intravenous Once Jez Morris MD        lactated ringers infusion   Intravenous Continuous Tequila Morrow  mL/hr at 20 0845      sodium chloride flush 0.9 % injection 10 mL  10 mL Intravenous 2 times per day Tequila Morrow MD        sodium chloride flush 0.9 % injection 10 mL  10 mL Intravenous PRN Tequila Morrow MD        lidocaine PF 1 % injection 1 mL  1 mL Intradermal Once PRN Tequila Morrow MD        remifentanil (ULTIVA) 2,000 mcg in sodium chloride 0.9 % 100 mL infusion  0.025 mcg/kg/min Intravenous Continuous Chantel Butt MD        lactated ringers infusion   Intravenous Continuous Chantel Butt MD 50 mL/hr at 20 0908         Allergies:  No Known Allergies    Problem List:    Patient Active Problem List   Diagnosis Code    Knee pain, left M25.562    Lateral meniscus tear S83.289A       Past Medical History:        Diagnosis Date    Arthritis     Dental crowns present    Bath  frequently     daily       Past Surgical History:        Procedure Laterality Date    COLONOSCOPY      CYST REMOVAL Right arm    CYST REMOVAL Left     abod left eye    KNEE ARTHROSCOPY Left 11/3/15    with partial lateral meniscectomy    TONSILLECTOMY         Social History:    Social History     Tobacco Use    Smoking status: Never Smoker    Smokeless tobacco: Never Used   Substance Use Topics    Alcohol use: No     Alcohol/week: 0.0 standard drinks     Comment: rarely                                Counseling given: Not Answered      Vital Signs (Current):   Vitals:    11/20/20 1201 11/24/20 0757 11/24/20 0819   BP:   (!) 150/84   Pulse:   79   Resp:   20   Temp:   98.2 °F (36.8 °C)   TempSrc:   Oral   SpO2:   100%   Weight: 114 lb (51.7 kg) 114 lb (51.7 kg)    Height: 5' 5\" (1.651 m) 5' 5\" (1.651 m)                                               BP Readings from Last 3 Encounters:   11/24/20 (!) 150/84   10/09/18 (!) 146/78   06/18/18 126/86       NPO Status: Time of last liquid consumption: 2200                        Time of last solid consumption: 2200                        Date of last liquid consumption: 11/23/20                        Date of last solid food consumption: 11/23/20    BMI:   Wt Readings from Last 3 Encounters:   11/24/20 114 lb (51.7 kg)   10/09/18 125 lb (56.7 kg)   06/18/18 125 lb (56.7 kg)     Body mass index is 18.97 kg/m².     CBC: No results found for: WBC, RBC, HGB, HCT, MCV, RDW, PLT    CMP:   Lab Results   Component Value Date     11/03/2015    K 3.7 11/03/2015     11/03/2015    CO2 27 11/03/2015    BUN 19 11/03/2015    CREATININE 0.6 11/03/2015    GFRAA >60 11/03/2015    LABGLOM >60 11/03/2015    GLUCOSE 88 11/03/2015    CALCIUM 10.3 11/03/2015       POC Tests:   Recent Labs     11/24/20  0900   POCGLU 95       Coags: No results found for: PROTIME, INR, APTT    HCG (If Applicable): No results found for: PREGTESTUR, PREGSERUM, HCG, HCGQUANT     ABGs: No results found for: PHART, PO2ART, SCN1BWU, WUT6UNF, BEART, T7CBXBYZ     Type & Screen (If Applicable):  No results found for: LABABO, [address]    Drug/Infectious Status (If Applicable):  No results found for: HIV, HEPCAB    COVID-19 Screening (If Applicable):   Lab Results   Component Value Date    COVID19 Not Detected 11/18/2020         Anesthesia Evaluation    Airway: Mallampati: II  TM distance: >3 FB   Neck ROM: full  Mouth opening: > = 3 FB Dental:          Pulmonary:       (-) asthma                           Cardiovascular:  Exercise tolerance: good (>4 METS),   (+) hypertension:,     (-) past MI,  angina and  REES                Neuro/Psych:      (-) seizures           GI/Hepatic/Renal:        (-) GERD       Endo/Other:        (-) diabetes mellitus               Abdominal:           Vascular:                                        Anesthesia Plan      general     ASA 2     (-npo MN  -denies chest pain or palp. Walks 30-40 min daily)  Induction: intravenous. MIPS: Postoperative opioids intended. Anesthetic plan and risks discussed with patient. Plan discussed with CRNA.                   Kimmie Dos Santos MD   11/24/2020

## 2020-11-24 NOTE — PROGRESS NOTES
Patient admitted to pacu post RIGHT L4-5 LATERAL LUMBAR INTERBODY FUSION WITH PEDICLE SCREW FIXATION - Right with Dr. Jf Branch. Patient connected to bedside monitors; VSS. Per CRNA patient was stable intraop. Patient resting comfortably with no complaints of pain.

## 2020-11-24 NOTE — CONSULTS
Hospital Medicine Initial Consultation    PCP: Sarika Ramirez MD    Date of Admission: 11/24/2020    Date of Service: 11/24/2020    Pt seen/examined on 11/24/2020    Consulting Physician: Dr. Ortega Living: Neurosurgery    Chief Complaint:  Back pain    History Of Present Illness:      79 y.o. female [facility] with chronic low back pain, a/w hip pain, persistent despite conservative treatment. We are consulted for medical management after the following procedure:  RIGHT L4-5 LATERAL LUMBAR INTERBODY FUSION WITH PEDICLE SCREW FIXATION    Past Medical History:          Diagnosis Date    Arthritis     Dental crowns present     Walks frequently     daily       Past Surgical History:          Procedure Laterality Date    COLONOSCOPY      CYST REMOVAL Right     arm    CYST REMOVAL Left     abod left eye    KNEE ARTHROSCOPY Left 11/3/15    with partial lateral meniscectomy    TONSILLECTOMY         Medications Prior to Admission:      Prior to Admission medications    Medication Sig Start Date End Date Taking? Authorizing Provider   Multiple Vitamin (MULTIVITAMIN PO) Take by mouth    Historical Provider, MD   Calcium Carbonate-Vitamin D 300-100 MG-UNIT CAPS Take 1 tablet by mouth 2 times daily     Historical Provider, MD       Allergies:  Patient has no known allergies. Social History:      TOBACCO:   reports that she has never smoked. She has never used smokeless tobacco.  ETOH:   reports no history of alcohol use. Family History:      Reviewed in detail and negative except as follows:        Problem Relation Age of Onset    Anesth Problems Other     Arthritis Other     Heart Disease Other     High Blood Pressure Other     Stroke Other        REVIEW OF SYSTEMS:   Pertinent positives and negatives as noted in the HPI. All other systems reviewed and negative.     PHYSICAL EXAM PERFORMED:    BP (!) 151/84   Pulse 88   Temp 97 °F (36.1 °C) (Temporal)   Resp 13 Ht 5' 5\" (1.651 m)   Wt 114 lb (51.7 kg)   SpO2 94%   BMI 18.97 kg/m²     General appearance:  No apparent distress, appears stated age  Eyes: Pupils equal, round, reactive to light, conjunctiva/corneas clear  Ears/Nose/Mouth/Throat: No external lesions or scars, hearing intact to voice  Neck: Trachea midline, no masses noted, no thyromegaly  Respiratory:  Normal respiratory effort. Clear to auscultation bilaterally  Cardiovascular: Regular rate and rhythm, nl S1/S2, w/o murmurs, rubs or gallops, no lower extremity edema  Abdomen: Soft, non-tender, non-distended, no hepatosplenomegaly  Musculoskeletal: No cyanosis, clubbing or petechiae, no lower extremity misalignment, asymmetry, or crepitation  Skin: Normal skin color, texture, turgor. No rashes or lesions noted. Psychiatric: Alert and oriented x4, good insight and judgment    Labs:     Recent Labs     11/24/20  0912   WBC 6.1   HGB 15.0   HCT 44.9        No results for input(s): NA, K, CL, CO2, BUN, CREATININE, CALCIUM, PHOS in the last 72 hours. Invalid input(s): MAGNES  No results for input(s): AST, ALT, BILIDIR, BILITOT, ALKPHOS in the last 72 hours. Recent Labs     11/24/20  0912   INR 0.94     No results for input(s): Towana Ball in the last 72 hours. Urinalysis:    No results found for: Vang Goldmann, BACTERIA, RBCUA, BLOODU, SPECGRAV, [address]    Radiology:    XR LUMBAR SPINE (2-3 VIEWS)   Final Result      8 frontal and lateral coned down intraoperative views assume 5 lumbar type vertebral bodies. There are various stages of placement of an intervertebral spacer at L4-5. Fluoroscopy time 5 minutes      FLUORO FOR SURGICAL PROCEDURES   Final Result      8 frontal and lateral coned down intraoperative views assume 5 lumbar type vertebral bodies. There are various stages of placement of an intervertebral spacer at L4-5.       Fluoroscopy time 5 minutes          ASSESSMENT:    Active Hospital Problems    Diagnosis Date Noted    Spondylolisthesis of lumbar region [M43.16] 11/24/2020       PLAN:    # s/p RIGHT L4-5 LATERAL LUMBAR INTERBODY FUSION WITH PEDICLE SCREW FIXATION  -management as per primary    # elevated BP  -not on meds at home, suspect due in part to post-op pain  -if persistently elevated will consider treating    # Other chronic conditions  -continue home management    DVT Prophylaxis: per primary  Diet: No diet orders on file  Code Status: Full Code    PT/OT Eval Status: per primary    Dispo - per primary, no barriers to dc from internal medicine perspective     Marcos Rg MD    Thank you Dr. Otilia Romano for the opportunity to be involved in this patient's care.  If you have any questions or concerns please feel free to contact me at [phone]

## 2020-11-24 NOTE — PROGRESS NOTES
Received per bed from PACU into room 6514. Alert and oriented, offers no complaints. Bed alarm on, will monitor for safety and comfort.

## 2020-11-24 NOTE — PROGRESS NOTES
4 Eyes Admission Assessment     I agree as the admission nurse that 2 RN's have performed a thorough Head to Toe Skin Assessment on the patient. ALL assessment sites listed below have been assessed on admission. Areas assessed by both nurses:   [x]   Head, Face, and Ears   [x]   Shoulders, Back, and Chest  [x]   Arms, Elbows, and Hands   [x]   Coccyx, Sacrum, and Ischium  [x]   Legs, Feet, and Heels        Does the Patient have Skin Breakdown?   No         Tee Prevention initiated:  NA   Wound Care Orders initiated:  NA      WOC nurse consulted for Pressure Injury (Stage 3,4, Unstageable, DTI, NWPT, and Complex wounds) or Tee score 18 or lower:  NA      Nurse 1 eSignature: Electronically signed by Roger Gordon RN on 11/24/20 at 5:53 PM EST    **SHARE this note so that the co-signing nurse is able to place an eSignature**    Nurse 2 eSignature: Electronically signed by Curtis Joel RN on 11/24/20 at 7:22 PM EST

## 2020-11-24 NOTE — H&P
Sridevi Loya    [id number]    [facility] Same Day [facility] Update H & P  Department of General Surgery   Surgical Service   Pre-operative History and Physical  Last H & P within the last 30 days. DIAGNOSIS:   Spinal stenosis, lumbar region with neurogenic claudication [M48.062]  Spondylolisthesis of lumbar region [M43.16]    Procedure(s):  RIGHT L4-5 LATERAL LUMBAR INTERBODY FUSION WITH PEDICLE SCREW FIXATION  . HISTORY OF PRESENT ILLNESS:   Patient has chronic lower back pain accompanied with left> right hip and thigh pain. The symptoms have been recalcitrant to conservative treatment and the patient presents today for the above procedure. Covid 19:  Patient denies fever, chills, cough or known exposure to Covid-19.       Past Medical History:        Diagnosis Date    Arthritis      Past Surgical History:        Procedure Laterality Date    COLONOSCOPY      CYST REMOVAL Right     arm    CYST REMOVAL Left     abod left eye    KNEE ARTHROSCOPY Left 11/3/15    with partial lateral meniscectomy    TONSILLECTOMY       Past Social History:  Social History     Socioeconomic History    Marital status:      Spouse name: None    Number of children: None    Years of education: None    Highest education level: None   Occupational History    Occupation: Retired   Social Needs    Financial resource strain: None    Food insecurity     Worry: None     Inability: None    Transportation needs     Medical: None     Non-medical: None   Tobacco Use    Smoking status: Never Smoker    Smokeless tobacco: Never Used   Substance and Sexual Activity    Alcohol use: No     Alcohol/week: 0.0 standard drinks     Comment: rarely    Drug use: No    Sexual activity: None   Lifestyle    Physical activity     Days per week: None     Minutes per session: None    Stress: None   Relationships    Social connections     Talks on phone: None     Gets together: None     Attends Confucianism service: None     Active member of club or organization: None     Attends meetings of clubs or organizations: None     Relationship status: None    Intimate partner violence     Fear of current or ex partner: None     Emotionally abused: None     Physically abused: None     Forced sexual activity: None   Other Topics Concern    None   Social History Narrative    None         Medications Prior to Admission:      Prior to Admission medications    Medication Sig Start Date End Date Taking? Authorizing Provider   Multiple Vitamin (MULTIVITAMIN PO) Take by mouth    Historical Provider, MD   Calcium Carbonate-Vitamin D 300-100 MG-UNIT CAPS Take 1 tablet by mouth 2 times daily     Historical Provider, MD         Allergies:  Patient has no known allergies. PHYSICAL EXAM:      BP (!) 150/84   Pulse 79   Temp 98.2 °F (36.8 °C) (Oral)   Resp 20   Ht 5' 5\" (1.651 m)   Wt 114 lb (51.7 kg)   SpO2 100%   BMI 18.97 kg/m²      Airway:  Airway patent with no audible stridor    Heart:  Regular rate and rhythm, No murmur noted    Lungs:  No increased work of breathing, good air exchange, clear to auscultation bilaterally, no crackles or wheezing    Abdomen:  Soft, non-distended, non-tender, normal active bowel sounds, no masses palpated    ASSESSMENT AND PLAN    Patient is a 79 y.o. female with above specified procedure planned. 1.  Patient seen and focused exam done today- no new changes since last physical exam on 10-    2. Access to ancillary services are available per request of the provider.     Matt Alex     11/24/2020

## 2020-11-24 NOTE — BRIEF OP NOTE
Brief Postoperative Note      Patient: Andria Dennison  YOB: 1950  MRN: [id number]    Date of Procedure: 11/24/2020    Pre-Op Diagnosis: Spondylolisthesis of lumbar region [M43.16] Spinal stenosis, lumbar region with neurogenic claudication [M48.062]    Post-Op Diagnosis: Same       Procedure(s):  RIGHT L4-5 LATERAL LUMBAR INTERBODY FUSION WITH PEDICLE SCREW FIXATION    Surgeon(s):  Octavio Pina MD    Assistant:  Physician Assistant: NANCY Lopez    Anesthesia: General    Estimated Blood Loss (mL): less than 815     Complications: None    Specimens:   * No specimens in log *    Implants:  * No implants in log *      Drains:   Urethral Catheter Latex;Straight-tip (Active)       Findings: as expected    Electronically signed by Octavio Pina MD on 11/24/2020 at 2:11 PM

## 2020-11-24 NOTE — PROGRESS NOTES
There is a potential delay in the start time of the procedure.   Patient and family informed of delay by CT  Made aware surgeon is still in the OR with another patient

## 2020-11-25 PROBLEM — Z98.1 S/P LUMBAR FUSION: Status: ACTIVE | Noted: 2020-11-24

## 2020-11-25 LAB
ANION GAP SERPL CALCULATED.3IONS-SCNC: 6 MMOL/L (ref 3–16)
BUN BLDV-MCNC: 14 MG/DL (ref 7–20)
CALCIUM SERPL-MCNC: 10.1 MG/DL (ref 8.3–10.6)
CHLORIDE BLD-SCNC: 105 MMOL/L (ref 99–110)
CO2: 28 MMOL/L (ref 21–32)
CREAT SERPL-MCNC: 0.7 MG/DL (ref 0.6–1.2)
GFR AFRICAN AMERICAN: >60
GFR NON-AFRICAN AMERICAN: >60
GLUCOSE BLD-MCNC: 130 MG/DL (ref 70–99)
HCT VFR BLD CALC: 39.4 % (ref 36–48)
HEMOGLOBIN: 12.9 G/DL (ref 12–16)
MCH RBC QN AUTO: 30.4 PG (ref 26–34)
MCHC RBC AUTO-ENTMCNC: 32.8 G/DL (ref 31–36)
MCV RBC AUTO: 92.7 FL (ref 80–100)
PDW BLD-RTO: 13.4 % (ref 12.4–15.4)
PLATELET # BLD: 213 K/UL (ref 135–450)
PMV BLD AUTO: 7.5 FL (ref 5–10.5)
POTASSIUM SERPL-SCNC: 4.4 MMOL/L (ref 3.5–5.1)
RBC # BLD: 4.25 M/UL (ref 4–5.2)
SODIUM BLD-SCNC: 139 MMOL/L (ref 136–145)
WBC # BLD: 11.1 K/UL (ref 4–11)

## 2020-11-25 PROCEDURE — 6370000000 HC RX 637 (ALT 250 FOR IP): Performed by: NEUROLOGICAL SURGERY

## 2020-11-25 PROCEDURE — 6370000000 HC RX 637 (ALT 250 FOR IP): Performed by: NURSE PRACTITIONER

## 2020-11-25 PROCEDURE — 80048 BASIC METABOLIC PNL TOTAL CA: CPT

## 2020-11-25 PROCEDURE — 97535 SELF CARE MNGMENT TRAINING: CPT

## 2020-11-25 PROCEDURE — 97530 THERAPEUTIC ACTIVITIES: CPT

## 2020-11-25 PROCEDURE — 2580000003 HC RX 258: Performed by: NEUROLOGICAL SURGERY

## 2020-11-25 PROCEDURE — 6360000002 HC RX W HCPCS: Performed by: NEUROLOGICAL SURGERY

## 2020-11-25 PROCEDURE — 97116 GAIT TRAINING THERAPY: CPT

## 2020-11-25 PROCEDURE — 1200000000 HC SEMI PRIVATE

## 2020-11-25 PROCEDURE — 36415 COLL VENOUS BLD VENIPUNCTURE: CPT

## 2020-11-25 PROCEDURE — 97161 PT EVAL LOW COMPLEX 20 MIN: CPT

## 2020-11-25 PROCEDURE — 85027 COMPLETE CBC AUTOMATED: CPT

## 2020-11-25 PROCEDURE — 6370000000 HC RX 637 (ALT 250 FOR IP): Performed by: INTERNAL MEDICINE

## 2020-11-25 PROCEDURE — 97165 OT EVAL LOW COMPLEX 30 MIN: CPT

## 2020-11-25 RX ORDER — OXYCODONE HYDROCHLORIDE 5 MG/1
5 TABLET ORAL EVERY 8 HOURS PRN
Qty: 24 TABLET | Refills: 0 | Status: SHIPPED | OUTPATIENT
Start: 2020-11-25 | End: 2020-12-02

## 2020-11-25 RX ORDER — POLYETHYLENE GLYCOL 3350 17 G/17G
17 POWDER, FOR SOLUTION ORAL DAILY
Status: DISCONTINUED | OUTPATIENT
Start: 2020-11-25 | End: 2020-11-27 | Stop reason: HOSPADM

## 2020-11-25 RX ORDER — METHOCARBAMOL 500 MG/1
1000 TABLET, FILM COATED ORAL EVERY 6 HOURS
Status: DISCONTINUED | OUTPATIENT
Start: 2020-11-25 | End: 2020-11-27 | Stop reason: HOSPADM

## 2020-11-25 RX ORDER — METHOCARBAMOL 500 MG/1
1000 TABLET, FILM COATED ORAL EVERY 6 HOURS PRN
Qty: 80 TABLET | Refills: 0 | Status: SHIPPED | OUTPATIENT
Start: 2020-11-25 | End: 2020-12-05

## 2020-11-25 RX ORDER — POLYETHYLENE GLYCOL 3350 17 G/17G
17 POWDER, FOR SOLUTION ORAL DAILY
Qty: 527 G | Refills: 1 | COMMUNITY
Start: 2020-11-25 | End: 2020-12-25

## 2020-11-25 RX ORDER — SENNA AND DOCUSATE SODIUM 50; 8.6 MG/1; MG/1
2 TABLET, FILM COATED ORAL 2 TIMES DAILY
Status: DISCONTINUED | OUTPATIENT
Start: 2020-11-25 | End: 2020-11-27

## 2020-11-25 RX ORDER — SENNA AND DOCUSATE SODIUM 50; 8.6 MG/1; MG/1
2 TABLET, FILM COATED ORAL 2 TIMES DAILY
COMMUNITY
Start: 2020-11-25

## 2020-11-25 RX ADMIN — ACETAMINOPHEN 650 MG: 325 TABLET ORAL at 16:48

## 2020-11-25 RX ADMIN — ACETAMINOPHEN 650 MG: 325 TABLET ORAL at 22:00

## 2020-11-25 RX ADMIN — ACETAMINOPHEN 650 MG: 325 TABLET ORAL at 00:00

## 2020-11-25 RX ADMIN — METHOCARBAMOL 1000 MG: 500 TABLET ORAL at 09:54

## 2020-11-25 RX ADMIN — METHOCARBAMOL 1000 MG: 500 TABLET ORAL at 15:26

## 2020-11-25 RX ADMIN — CALCIUM CARBONATE-VITAMIN D TAB 500 MG-200 UNIT 1 TABLET: 500-200 TAB at 09:54

## 2020-11-25 RX ADMIN — DOCUSATE SODIUM 50 MG AND SENNOSIDES 8.6 MG 2 TABLET: 8.6; 5 TABLET, FILM COATED ORAL at 22:00

## 2020-11-25 RX ADMIN — DOCUSATE SODIUM 50 MG AND SENNOSIDES 8.6 MG 2 TABLET: 8.6; 5 TABLET, FILM COATED ORAL at 09:55

## 2020-11-25 RX ADMIN — Medication 10 ML: at 23:49

## 2020-11-25 RX ADMIN — FAMOTIDINE 20 MG: 20 TABLET ORAL at 09:54

## 2020-11-25 RX ADMIN — POLYETHYLENE GLYCOL 3350 17 G: 17 POWDER, FOR SOLUTION ORAL at 09:54

## 2020-11-25 RX ADMIN — ENOXAPARIN SODIUM 40 MG: 40 INJECTION SUBCUTANEOUS at 22:00

## 2020-11-25 RX ADMIN — FAMOTIDINE 20 MG: 20 TABLET ORAL at 22:00

## 2020-11-25 RX ADMIN — METHOCARBAMOL 1000 MG: 500 TABLET ORAL at 21:59

## 2020-11-25 RX ADMIN — OXYCODONE HYDROCHLORIDE 10 MG: 5 TABLET ORAL at 19:39

## 2020-11-25 RX ADMIN — OXYCODONE HYDROCHLORIDE 5 MG: 5 TABLET ORAL at 10:41

## 2020-11-25 RX ADMIN — MAGESIUM CITRATE 296 ML: 1.75 LIQUID ORAL at 09:54

## 2020-11-25 RX ADMIN — ACETAMINOPHEN 650 MG: 325 TABLET ORAL at 09:55

## 2020-11-25 RX ADMIN — Medication 10 ML: at 09:55

## 2020-11-25 RX ADMIN — CALCIUM CARBONATE-VITAMIN D TAB 500 MG-200 UNIT 1 TABLET: 500-200 TAB at 22:00

## 2020-11-25 RX ADMIN — OXYCODONE HYDROCHLORIDE 10 MG: 5 TABLET ORAL at 15:26

## 2020-11-25 RX ADMIN — ACETAMINOPHEN 650 MG: 325 TABLET ORAL at 06:22

## 2020-11-25 RX ADMIN — CEFAZOLIN SODIUM 2 G: 2 SOLUTION INTRAVENOUS at 03:34

## 2020-11-25 RX ADMIN — OXYCODONE HYDROCHLORIDE 5 MG: 5 TABLET ORAL at 06:23

## 2020-11-25 ASSESSMENT — PAIN DESCRIPTION - PROGRESSION
CLINICAL_PROGRESSION: NOT CHANGED
CLINICAL_PROGRESSION: NOT CHANGED
CLINICAL_PROGRESSION: GRADUALLY WORSENING
CLINICAL_PROGRESSION: GRADUALLY WORSENING

## 2020-11-25 ASSESSMENT — PAIN SCALES - GENERAL
PAINLEVEL_OUTOF10: 0
PAINLEVEL_OUTOF10: 4
PAINLEVEL_OUTOF10: 5
PAINLEVEL_OUTOF10: 7
PAINLEVEL_OUTOF10: 4
PAINLEVEL_OUTOF10: 6
PAINLEVEL_OUTOF10: 3
PAINLEVEL_OUTOF10: 8
PAINLEVEL_OUTOF10: 6

## 2020-11-25 ASSESSMENT — PAIN DESCRIPTION - ONSET
ONSET: ON-GOING

## 2020-11-25 ASSESSMENT — PAIN DESCRIPTION - ORIENTATION
ORIENTATION: RIGHT;MID
ORIENTATION: MID;LOWER;LEFT;RIGHT
ORIENTATION: MID;LOWER;LEFT;RIGHT
ORIENTATION: LOWER;MID

## 2020-11-25 ASSESSMENT — PAIN DESCRIPTION - PAIN TYPE
TYPE: SURGICAL PAIN
TYPE: SURGICAL PAIN
TYPE: ACUTE PAIN
TYPE: SURGICAL PAIN

## 2020-11-25 ASSESSMENT — PAIN DESCRIPTION - LOCATION
LOCATION: BACK
LOCATION: ABDOMEN;BACK
LOCATION: ABDOMEN
LOCATION: BACK;ABDOMEN
LOCATION: ABDOMEN;BACK

## 2020-11-25 ASSESSMENT — PAIN - FUNCTIONAL ASSESSMENT
PAIN_FUNCTIONAL_ASSESSMENT: PREVENTS OR INTERFERES SOME ACTIVE ACTIVITIES AND ADLS

## 2020-11-25 ASSESSMENT — PAIN DESCRIPTION - DESCRIPTORS
DESCRIPTORS: ACHING
DESCRIPTORS: CRAMPING;ACHING
DESCRIPTORS: ACHING;CRAMPING
DESCRIPTORS: ACHING;CRAMPING

## 2020-11-25 ASSESSMENT — PAIN DESCRIPTION - FREQUENCY
FREQUENCY: CONTINUOUS

## 2020-11-25 NOTE — PROGRESS NOTES
Physical Therapy    Facility/Department: [facility]  Initial Assessment and Treatment    NAME: Misael Thompson  : 1950  MRN: [id number]    Date of Service: 2020    Discharge Recommendations:    Misael Thompson scored a 18/24 on the AM-PAC short mobility form. Current research shows that an AM-PAC score of 18 or greater is typically associated with a discharge to the patient's home setting. Based on the patient's AM-PAC score and their current functional mobility deficits, it is recommended that the patient have 2-3 sessions per week of Physical Therapy at d/c to increase the patient's independence. At this time, this patient demonstrates the endurance and safety to discharge home with  (home vs OP services) and a follow up treatment frequency of 2-3x/wk. Please see assessment section for further patient specific details. If patient discharges prior to next session this note will serve as a discharge summary. Please see below for the latest assessment towards goals. PT Equipment Recommendations  Equipment Needed: No    Assessment   Assessment: Pt POD1 post lumbar fusion, currently is below her normal level of functional mobility. Pt plans on return home with assist of spouse. Recommend continued therapies to maximize mobility and independence  Treatment Diagnosis: Decreased functional mobilty post lumbar fusion  Prognosis: Good  Decision Making: Low Complexity  PT Education: PT Role;Goals; General Safety;Gait Training;Transfer Training;Functional Mobility Training  Barriers to Learning: none noted  REQUIRES PT FOLLOW UP: Yes  Activity Tolerance  Activity Tolerance: Patient Tolerated treatment well;Patient limited by pain       Patient Diagnosis(es): The encounter diagnosis was S/P lumbar fusion. has a past medical history of Arthritis, Dental crowns present, and Walks frequently.    has a past surgical history that includes Tonsillectomy; Colonoscopy; Knee arthroscopy (Left, 11/3/15); cyst removal (Right); cyst removal (Left); and lumbar fusion (Right, 11/24/2020). Restrictions  Restrictions/Precautions  Restrictions/Precautions: Weight Bearing  Position Activity Restriction  Other position/activity restrictions: activity as tolerated, ambulate pt. HOB 30 deg at all times. Vision/Hearing  Vision: Impaired  Vision Exceptions: Wears glasses at all times  Hearing: Within functional limits     Subjective  General  Chart Reviewed: Yes  Additional Pertinent Hx: arthritis, knee scope  Referring Practitioner: Sofi Reyes MD  Diagnosis: Right L4-5 lateral lumbar interbody fusion with pedicle screw fixation  Subjective  Subjective: Pt seated in chair upon PT entry, agreeable to working with PT  Pain Screening  Patient Currently in Pain: Yes(notes back and abdominal pain 5-7/10 as well as abdominal swelling. RN notified.)  Vital Signs  Patient Currently in Pain: Yes(notes back and abdominal pain 5-7/10 as well as abdominal swelling.   RN notified.)       Orientation  Orientation  Overall Orientation Status: Within Normal Limits  Social/Functional History  Social/Functional History  Lives With: Spouse  Type of Home: House  Home Layout: One level  Home Access: Stairs to enter without rails  Entrance Stairs - Number of Steps: 2  Bathroom Shower/Tub: Tub/Shower unit, Shower chair with back  [employer]: Handicap height  Bathroom Equipment: Grab bars in shower, Grab bars around toilet, Shower chair  Home Equipment: Reacher, Rolling walker, Grab bars  ADL Assistance: Independent  Homemaking Assistance: Independent  Homemaking Responsibilities: Yes  Ambulation Assistance: Independent  Transfer Assistance: Independent  Active : Yes  Occupation: Retired  Type of occupation: [address]  Additional Comments: no falls to report  Cognition        Objective  AROM RLE (degrees)  RLE AROM: WFL  AROM LLE (degrees)  LLE AROM : WFL  Strength RLE  Strength RLE: WFL  Strength LLE  Strength LLE: [facility] Transfers  Sit to Stand: Stand by assistance(cues for hand placement)  Stand to sit: Stand by assistance(cues to reach for arms of chair)  Ambulation  Ambulation?: Yes  Ambulation 1  Surface: level tile  Device: Rolling Walker  Assistance: Stand by assistance  Quality of Gait: slow and steady gait with RW, no LOB noted  Distance: 80 ft  Stairs/Curb  Stairs?: Yes(up and down one curb step with RW and CG, good stability)     Balance  Comments: Good stability during functional mobility and gait with RW.   No LOB        Plan   Plan  Times per week: 5-7  Current Treatment Recommendations: Functional Mobility Training, Transfer Training, Gait Training, Safety Education & Training  Safety Devices  Type of devices: Nurse notified, Chair alarm in place, Call light within reach, Left in chair    AM-PAC Score  AM-PAC Inpatient Mobility Raw Score : 18 (11/25/20 1137)  AM-PAC Inpatient T-Scale Score : 43.63 (11/25/20 1137)  Mobility Inpatient CMS 0-100% Score: 46.58 (11/25/20 1137)  Mobility Inpatient CMS G-Code Modifier : CK (11/25/20 1137)          Goals  Short term goals  Time Frame for Short term goals: Discharge  Short term goal 1: Mod I bed mobility  Short term goal 2: Mod I transfers  Short term goal 3: Ambulate 100 ft with RW and supervision  Short term goal 4: Up and down curb step with RW and SBA  Patient Goals   Patient goals : Return home       Therapy Time   Individual Concurrent Group Co-treatment   Time In 0810         Time Out 0850         Minutes 40              Timed Code Treatment Minutes:    25    Total Treatment Minutes:  [facility], [facility] Yusuf Alejo Real

## 2020-11-25 NOTE — CARE COORDINATION
Case Management Assessment           Initial Evaluation                Date / Time of Evaluation: 11/25/2020 2:00 PM                 Assessment Completed by: Yancy Hernandez    Patient Name: Todd Ramirez     YOB: 1950  Diagnosis: Spinal stenosis, lumbar region with neurogenic claudication [M48.062]  Spondylolisthesis of lumbar region [M43.16]  Spondylolisthesis of lumbar region [M43.16]     Date / Time: 11/24/2020  7:28 AM    Patient Admission Status: Inpatient    If patient is discharged prior to next notation, then this note serves as note for discharge by case management. Current PCP: Efrain Molina MD  Clinic Patient: No    Chart Reviewed: Yes  Patient/ Family Interviewed: Yes    Initial assessment completed at bedside with: f2f at bedside with patient    Hospitalization in the last 30 days: No    Emergency Contacts:  Extended Emergency Contact Information  Primary Emergency Contact: Isiah Parry  Address: [address] [place] [address] Dr Isabel, [address] [place] [address] Phone: [phone]  Mobile Phone: [phone]  Relation: Spouse    Advance Directives:   Code Status: Full [address]: No  Agent: NA  Contact Number: NA    Copy present: No     In paper Chart: No    Scanned into EMR No    Financial  Payor: MEDICARE / Plan: MEDICARE PART A AND B / Product Type: *No Product type* /     Pre-cert required for SNF: No    Pharmacy    [facility] [address], [place] - [address] [phone] [facility] [phone] [address] [facility]  Phone: [phone] Fax: [address] [phone]  [facility] [address]  Phone: [phone] Fax: [phone]      Potential assistance Purchasing Medications:    Does Patient want to participate in local refill/ meds to beds program?: No    Meds To Beds General Rules:  1.  Can ONLY be done Monday- Friday between 8:30am-5pm  2. Prescription(s) must be in pharmacy by 3pm to be filled same day  3. Copy of patient's insurance/ prescription drug card and patient face sheet must be sent along with the prescription(s)  4. Cost of Rx cannot be added to hospital bill. If financial assistance is needed, please contact unit  or ;  or  CANNOT provide pharmacy voucher for patients co-pays  5.  Patients can then  the prescription on their way out of the hospital at discharge, or pharmacy can deliver to the bedside if staff is available. (payment due at time of pick-up or delivery - cash, check, or card accepted)     Able to afford home medications/ co-pay costs: Yes    ADLS  Support Systems: Spouse/Significant Other    PT AM-PAC: 18 /24  OT AM-PAC: 18 /24    [place]: Home with   Steps: 2 steps    Plans to RETURN to current housing: Yes  Barriers to RETURNING to current housing: [address]  Currently ACTIVE with [address]: No  Home Care Agency: [facility]  Phone: [phone]  Fax: [phone]    Currently ACTIVE with [place] on Aging: No  Passport/ Waiver: No  Passport/ Waiver Services: Not Applicable    : [facility] Phone: [address]  [facility] Provider: [facility]  Equipment: NA    Home Oxygen and [address] [place] prior to admission: No  [address]: Not Applicable  Other Respiratory Equipment: NA    Informed of need to bring portable home O2 tank on day of DISCHARGE for nursing to connect prior to leaving: No  Verbalized agreement/Understanding: No  Person to bring portable tank at discharge: NA    Dialysis  Active with HD/PD prior to admission: No  Nephrologist: Gabo Deutsch 61:  Not Applicable    DISCHARGE PLAN:  Disposition: Home with [address]: [facility] for discharge: family     Factors facilitating achievement of predicted outcomes: Family support    Barriers to discharge: Pain    Additional Case Management Notes:   CM spoke with patient at bedside. Patient plans to discharge home. Agreeable to [facility] Lock. CM spoke with Lisa[facility]. Ashlyn Dorantes will follow for home care. Patient will have  transport home. Patient denied having any questions or concerns in regards to discharge plans. The Plan for Transition of Care is related to the following treatment goals of Spinal stenosis, lumbar region with neurogenic claudication [M48.062]  Spondylolisthesis of lumbar region [M43.16]  Spondylolisthesis of lumbar region [M43.16]    The Patient and/or patient representative Brandon Davis and her family were provided with a choice of provider and agrees with the discharge plan Yes    Freedom of choice list was provided with basic dialogue that supports the patient's individualized plan of care/goals and shares the quality data associated with the providers.  Yes    Care Transition patient: No    Angel Parra RN  [facility] [email or web address]  Case Management Department  Ph: [phone]   Fax: [phone]

## 2020-11-25 NOTE — PROGRESS NOTES
Patient alert and orientated. VSS. Pain controlled with oral medication. Neuro status stable. Incision site is dry and intact, minimal drainage noted. Patient up x1 with walker and gait belt to bathroom, tolerated well. Fall precautions in place. Call light within reach. Will continue to assess and monitor.

## 2020-11-25 NOTE — PLAN OF CARE
Problem: Falls - Risk of:  Goal: Will remain free from falls  Description: Will remain free from falls  Outcome: Ongoing  Note: Patient will remain free from falls. Patient alert and orientated and uses call light appropriately. Patient up x1 with walker, tolerating well. Fall precautions in place. Bed locked and in lowest position, bed alarm on, nonskid socks on. Call light within reach. Problem: Pain:  Goal: Pain level will decrease  Description: Pain level will decrease  Outcome: Ongoing  Note: Patient pain currently controlled with oral medication. Patient able to sleep comfortably in bed. Will continue to assess and monitor.

## 2020-11-25 NOTE — PROGRESS NOTES
Patient is alert and oriented x4. Vital signs stable. Pain to lower back and abdomen controlled with PRN curtis. Surgical sites to lower back closed with surgical glue and open to air, CDI. No acute neuro changes throughout shift. Tolerating diet well, denies n/v. Ambulating x1 assist with walker and gait belt. Voiding without complications. Fall precautions in place. Bed locked in lowest position with alarm on. Call light placed within reach and patient using appropriately. Resting in bed with no further needs. Will continue to monitor.

## 2020-11-25 NOTE — PLAN OF CARE
Problem: Falls - Risk of:  Goal: Will remain free from falls  Description: Will remain free from falls  11/25/2020 1110 by Peter Chisholm RN  Outcome: Ongoing  Note: Patient will remain free from falls throughout shift. Ambulating x1 assist with walker and gait belt. Fall precautions in place. Non-skid socks on. Bed locked in lowest position with alarm on and 2/4 side rails up. Bedside table, belongings, and call light placed within reach. Patient calling out appropriately when needing assistance. Hourly rounding in anticipation of patient needs. Floor clean and free from clutter. Room door open. Will continue to monitor. Problem: Pain:  Goal: Pain level will decrease  Description: Pain level will decrease  11/25/2020 1110 by Peter Chisholm RN  Outcome: Ongoing  Note: Patient endorsing surgical pain to mid/lower back. Rating pain a 6/10. PRN curtis administered per the [facility]. Patient educated on medication, side effects, verbalized understanding. Able to reposition self in bed/chair frequently for comfort. Notified when next dose of medication can be administered. Upon reassessment, patient resting in chair and rating pain a 4/10. Will continue to monitor and reassess.

## 2020-11-25 NOTE — PROGRESS NOTES
hepatosplenomegaly  Musculoskeletal: No cyanosis, clubbing or petechiae, no lower extremity misalignment, asymmetry, or crepitation  Skin: Normal skin color, texture, turgor. No rashes or lesions noted. Psychiatric: Alert and oriented x4, good insight and judgment    Labs:   Recent Labs     11/24/20  0912 11/25/20  0533   WBC 6.1 11.1*   HGB 15.0 12.9   HCT 44.9 39.4    213     Recent Labs     11/25/20  0533      K 4.4      CO2 28   BUN 14   CREATININE 0.7   CALCIUM 10.1     No results for input(s): AST, ALT, BILIDIR, BILITOT, ALKPHOS in the last 72 hours. Recent Labs     11/24/20  0912   INR 0.94     No results for input(s): Bess Lowers in the last 72 hours. Urinalysis:    No results found for: Pj Laws, BACTERIA, RBCUA, BLOODU, SPECGRAV, Mickey [place] [address]94    Radiology:  XR LUMBAR SPINE (2-3 VIEWS)   Final Result      8 frontal and lateral coned down intraoperative views assume 5 lumbar type vertebral bodies. There are various stages of placement of an intervertebral spacer at L4-5. Fluoroscopy time 5 minutes      FLUORO FOR SURGICAL PROCEDURES   Final Result      8 frontal and lateral coned down intraoperative views assume 5 lumbar type vertebral bodies. There are various stages of placement of an intervertebral spacer at L4-5.       Fluoroscopy time 5 minutes          Assessment/Plan:    Active Hospital Problems    Diagnosis Date Noted    Spondylolisthesis of lumbar region [M43.16] 11/24/2020       Plan:    # s/p RIGHT L4-5 LATERAL LUMBAR INTERBODY FUSION WITH PEDICLE SCREW FIXATION  -management as per primary    # Constipation  -increase bowel regimen     # elevated BP  -due to pain, has resolved now    # Other chronic conditions  -continue home management    DVT Prophylaxis: per primary  Diet: DIET GENERAL;  Code Status: Full Code    PT/OT Eval Status: per primary    Dispo: per primary    Eddy Diaz MD

## 2020-11-25 NOTE — PROGRESS NOTES
Occupational Therapy   Occupational Therapy Initial Assessment and Treatment   Date: 2020   Patient Name: Glenroy Tyson  MRN: [id number]     : 1950    Date of Service: 2020    Discharge Recommendations: Glenroy Tyson scored a 18/24 on the AM-PAC ADL Inpatient form. Current research shows that an AM-PAC score of 18 or greater is typically associated with a discharge to the patient's home setting. Based on the patient's AM-PAC score, and their current ADL deficits, it is recommended that the patient have 2-3 sessions per week of Occupational Therapy at d/c to increase the patient's independence. At this time, this patient demonstrates the endurance and safety to discharge home with (home vs OP services) and a follow up treatment frequency of 2-3x/wk. Please see assessment section for further patient specific details. If patient discharges prior to next session this note will serve as a discharge summary. Please see below for the latest assessment towards goals. OT Equipment Recommendations  Equipment Needed: No(pt has showe chair, stated A from  for ADL at this time)    Assessment   Performance deficits / Impairments: Decreased functional mobility ; Decreased ADL status; Decreased endurance;Decreased high-level IADLs  Assessment: pt moving well at this time, close to baseline. Pt requires SBA with RW for functional mobility/transfers, at basline no device needed. Pt SBA with most ADL at this time, require A for LB dressing. Rec family A or sock aid for LB dressing to maintain precautions. Pt would benefit from OT service sin acute care to improve IND with safety, mobilty and ADL. Pt stated will have 24hr A from  at home.    Prognosis: Good  Decision Making: Low Complexity  OT Education: OT Role;Plan of Care;Precautions  Patient Education: verb understanding  REQUIRES OT FOLLOW UP: Yes  Activity Tolerance  Activity Tolerance: Patient Tolerated treatment well  Safety Devices  Safety Devices in place: Yes  Type of devices: Left in chair;Nurse notified;Call light within reach; Chair alarm in place;Gait belt           Patient Diagnosis(es): The encounter diagnosis was S/P lumbar fusion. has a past medical history of Arthritis, Dental crowns present, and Walks frequently. has a past surgical history that includes Tonsillectomy; Colonoscopy; Knee arthroscopy (Left, 11/3/15); cyst removal (Right); cyst removal (Left); and lumbar fusion (Right, 11/24/2020). Restrictions  Restrictions/Precautions  Restrictions/Precautions: Weight Bearing  Position Activity Restriction  Other position/activity restrictions: activity as tolerated, ambulate pt. HOB 30 deg at all times. Subjective   General  Chart Reviewed: Yes  Additional Pertinent Hx: 79 y.o. female who presented to [facility] [employer] with chronic low back pain, a/w hip pain, persistent despite conservative treatment.  RIGHT L4-5 LATERAL LUMBAR INTERBODY FUSION WITH PEDICLE SCREW FIXATION  Diagnosis: Spinal stenosis, lumbar region with neurogenic claudication RIGHT L4-5 LATERAL LUMBAR INTERBODY FUSION WITH PEDICLE SCREW FIXATION  Subjective  Subjective: pt seated in chair upon arrival, agreeable to therapy session  Pain Assessment  Pain Level: 5  Social/Functional History  Social/Functional History  Lives With: Spouse  Type of Home: House  Home Layout: One level  Home Access: Stairs to enter without rails  Entrance Stairs - Number of Steps: 2  Bathroom Shower/Tub: Tub/Shower unit, Shower chair with back  [employer]: Handicap height  Bathroom Equipment: Grab bars in shower, Grab bars around toilet, Shower chair  Home Equipment: Reacher, Rolling walker, Grab bars  ADL Assistance: Independent  Homemaking Assistance: Independent  Homemaking Responsibilities: Yes  Ambulation Assistance: Independent  Transfer Assistance: Independent  Active : Yes  Occupation: Retired  Type of occupation: [address] Comments: no falls to report       Objective   Vision: Impaired  Vision Exceptions: Wears glasses at all times  Hearing: Within functional limits    Orientation  Overall Orientation Status: Within Normal Limits     Balance  Sitting Balance: Supervision  Standing Balance: Stand by assistance  Standing Balance  Time: ~10-15 min  Activity: amb in room, hallway ~150', standing ADL/grooming at sink  Functional Mobility  Functional - Mobility Device: Rolling Walker  Assist Level: Stand by assistance  Functional Mobility Comments: pt steady gait, some difficulty with manuverability of RW as it is new to pt. Pt moving well with SBA  Toilet Transfers  Toilet - Technique: Ambulating  Equipment Used: Standard toilet  Toilet Transfer: Stand by assistance  Toilet Transfers Comments: vc for RW use  ADL  Feeding: Independent  Grooming: Supervision  Toileting: Stand by assistance  Additional Comments: pt completed toileting this session with SBA and tooth brushing with SPV standing at the sink with RW.            Transfers  Sit to stand: Stand by assistance  Stand to sit: Stand by assistance     Cognition  Overall Cognitive Status: WNL                 LUE AROM (degrees)  LUE AROM : WFL  Left Hand AROM (degrees)  Left Hand AROM: WFL  RUE AROM (degrees)  RUE AROM : WFL  Right Hand AROM (degrees)  Right Hand AROM: WFL                      Plan   Plan  Times per week: 5-7  Times per day: Daily  Current Treatment Recommendations: Functional Mobility Training, Endurance Training, Gait Training, Self-Care / ADL      AM-PAC Score        AM-[facility] Inpatient Daily Activity Raw Score: 18 (11/25/20 1035)  AM-PAC Inpatient ADL T-Scale Score : 38.66 (11/25/20 1035)  ADL Inpatient CMS 0-100% Score: 46.65 (11/25/20 1035)  ADL Inpatient CMS G-Code Modifier : CK (11/25/20 1035)    Goals  Short term goals  Time Frame for Short term goals: d/c  Short term goal 1: pt will be Veto with fxl mobility/transfers using RW  Short term goal 2: pt will be Veto with bed mobility  Short term goal 3: pt will be supervision with toileting  Short term goal 4: pt will be IND with grooming tasks standing at the sink  Patient Goals   Patient goals : home       Therapy Time   Individual Concurrent Group Co-treatment   Time In 0820         Time Out 0848         Minutes 28              Timed Code Treatment Minutes:   21    Total Treatment Minutes:  [id number] .[facility] 59  N, OTR/L

## 2020-11-25 NOTE — OP NOTE
Operative Note      Patient: Flip Patricio  YOB: 1950  MRN: [id number]    Date of Procedure: 11/24/2020        Pre-Op Diagnosis: Spondylolisthesis, lumbar region [M43.16]  Lumbar stenosis  Lumbar instability  Lumbar radiculopathy     Post-Op Diagnosis: Same       Procedure(s):  1)right L4-5 TRANSPSOAS INTERBODY FUSION   2) L4-L5 PEDICLE SCREW FIXATION  3) left L4-5 total facetectomy  4) Neuromonitoring with SSEP, EMG, MEPS  5) stereotact Navigation with O-arm and stealthstation   6) L4-5 posterolateral fusion with autograft and allograft  Surgeon(s):  Oscar Yun MD     Assistant: : Imani Becerra PA-C.  There were no qualified residents available to assist with the case. Imani's assistance was required due to the complexity of the case to mitigate blood loss, working concert to expose and close wound.  More important, she was involved with the instrumentation and interbody grafting     Anesthesia: General     Estimated Blood Loss (mL): less than 776      Complications: None     Specimens:   none        Findings: as expected     Detailed Description of Procedure:   DICATIONS FOR SURGERY:  The patient is a 71y/o female with back and leg pain. her  symptoms failed to respond to conservative intervention. An MRI scan was performed and this demonstrated evidence of  lumbar spondylolisthesis at L4-5 associated with radiculopathy.  Having failed conservative management and experiencing persistent symptoms, the patient elected to proceed ahead with the surgical option of  LLIF for indirect central decompression and foraminal decompression.     DETAILS OF PROCEDURE: The patient was brought to the operating room and placed under general anesthesia. s He was then placed lateral. All bony prominences were inspected and padded prior to sterile draping. The hips and shoulders were secured with silk tape to ensure true lateral placement, and the table was broke at the hip to open the lateral space. The right flank was then prepped and draped in the usual sterile fashion. Fluoroscopy was used to verify the incision over the L4-5 interspace and ensure true lateral position.       Using a #15 blade knife, the skin was incised. The oblique fascia was incised with a bovie and blunt dissection was then used within the retroperitoneal space to develop the plane anterior to transverse processes of L4-5.  I used biplanar fluoroscopic imaging to localize and target the L4-5 interspace with stimulator probe. Once over the interspace, stimulation was performed to avoid the Lumbar plexus nerve root within the psoas muscle. This was gradually advanced under stimulation into the lateral annulus of L4-5. The exposure was progressively dilated with the tubular retractors, stimulating between each consecutive dilation. The retractor was then advanced over the dilators and fixed in place. Fluoroscopy verified true orthogonal position over the L4-5 space. A #4 Penfield dissector was used to sweep the psoas muscle posteriorly. The entrance into the disc was again stimulated with no response. A #15 blade was used to incise the lateral annulus. A discectomy was performed using pituitary rongeurs, curettes and cob dissectors. The contralateral annulus was transected using the cob dissector. Once the disc preparation was complete, a #8 and then #10 trial was used open the space. A #10, nuvasvie peek interbody w was selected, filled with allograft bone and BMP and impacted into the disc space. Position was verified with fluoroscopy. The wound was then irrigated with antibiotic solution. The retractor was collapsed under direct visualization and removed slowly to visualize the tissues.        The wound was again irrigated and then closed in the usual fashion using interrupted 0 Vicryl sutures in the fascia, followed by 3-0 Vicryl and running 4-0 Monocryl in the subcuticular layer.  A Dermabond dressing was then applied.  The patient was extubated in the operating room and transferred to the recovery room in stable condition.  There were no complications. All needle, instrument, and sponge counts were correct.      Once theflank was closed the patient was carefully turned prone onto a Colby table. Again all pressure points were adequately padded and the patient was secured to the operating room table. The back was cleansed with alcohol and prepped and draped in the usual sterile fashion.   A reference array was placed into the right PSIS and an o-arm spin  was performed.  Bilateral paramedian incisions were planned for appropriate exposure. A 10 blade knife was used to incise the skin and dermis. Bovie electrocautery was used to dissect through the subcutaneous tissue down to fascia. The fascia was split. [employer] needles with O-arm stereotactic navigation with stealth station were used to cannulate the pedicles followed by k-wires . I then tapped the screws and placed nuvasive pedicle screws from L4-L5 bilaterally. I then dissected down to the right L4-5 facet with the MAS TLIF retractor. I then performed a left L4-5 facetectomy with M-8 drill bit and performed a direct decompression with kerrosens.  A titanium gucci was placed and provisionally tightened with locking caps followed by final tightening with antitorque device. This was our L4-5  Posterior nonsegmental fixation. Fluoroscopy confirmed excellent placement of the instrumentation. The pedicle screws did not stimulate above 18 on EMG.  I then turned my attention towards decortication and decorticated the posterolateral elements and placed allograft fusion and locally harvested autograft from L4-L5. The wound was then closed with 0-Vicryl suture to approximate the fascia followed by 2-0 Vicryl suture to close the subcutaneous tissue and dermis followed by 4-0 Monocryl running subcuticular stitch to close the skin. DERMABOND was layered over the skin edges.  All drapes were then removed.  The patient was turned back to the hospital bed.  sHe was awakened and taken to the Recovery Room in stable condition.    All sponge, needle, and instrument counts were correct at the end of the case. I affirm in accordance with CMS regulations that I was present for all critical portions of the case. Kehinde Dawkins MD      In accordance with CMS guidelines, I attest that I was present for the entire procedure from the creation of the skin incision to the closure.   Electronically signed by Reji Schrader MD on 11/25/2020 at 5:51 AM

## 2020-11-26 PROCEDURE — 1200000000 HC SEMI PRIVATE

## 2020-11-26 PROCEDURE — 6370000000 HC RX 637 (ALT 250 FOR IP): Performed by: NURSE PRACTITIONER

## 2020-11-26 PROCEDURE — 97530 THERAPEUTIC ACTIVITIES: CPT

## 2020-11-26 PROCEDURE — 6370000000 HC RX 637 (ALT 250 FOR IP): Performed by: INTERNAL MEDICINE

## 2020-11-26 PROCEDURE — 6360000002 HC RX W HCPCS: Performed by: NEUROLOGICAL SURGERY

## 2020-11-26 PROCEDURE — 6370000000 HC RX 637 (ALT 250 FOR IP): Performed by: NEUROLOGICAL SURGERY

## 2020-11-26 PROCEDURE — 97116 GAIT TRAINING THERAPY: CPT

## 2020-11-26 PROCEDURE — 2580000003 HC RX 258: Performed by: NEUROLOGICAL SURGERY

## 2020-11-26 RX ORDER — DOCUSATE SODIUM 100 MG/1
100 CAPSULE, LIQUID FILLED ORAL ONCE
Status: COMPLETED | OUTPATIENT
Start: 2020-11-26 | End: 2020-11-26

## 2020-11-26 RX ADMIN — DOCUSATE SODIUM 100 MG: 100 CAPSULE, LIQUID FILLED ORAL at 14:19

## 2020-11-26 RX ADMIN — FAMOTIDINE 20 MG: 20 TABLET ORAL at 21:48

## 2020-11-26 RX ADMIN — ACETAMINOPHEN 650 MG: 325 TABLET ORAL at 11:05

## 2020-11-26 RX ADMIN — METHOCARBAMOL 1000 MG: 500 TABLET ORAL at 08:50

## 2020-11-26 RX ADMIN — ACETAMINOPHEN 650 MG: 325 TABLET ORAL at 16:25

## 2020-11-26 RX ADMIN — Medication 10 ML: at 08:51

## 2020-11-26 RX ADMIN — ENOXAPARIN SODIUM 40 MG: 40 INJECTION SUBCUTANEOUS at 08:52

## 2020-11-26 RX ADMIN — OXYCODONE HYDROCHLORIDE 10 MG: 5 TABLET ORAL at 11:05

## 2020-11-26 RX ADMIN — DOCUSATE SODIUM 50 MG AND SENNOSIDES 8.6 MG 2 TABLET: 8.6; 5 TABLET, FILM COATED ORAL at 21:48

## 2020-11-26 RX ADMIN — DOCUSATE SODIUM 50 MG AND SENNOSIDES 8.6 MG 2 TABLET: 8.6; 5 TABLET, FILM COATED ORAL at 08:50

## 2020-11-26 RX ADMIN — POLYETHYLENE GLYCOL 3350 17 G: 17 POWDER, FOR SOLUTION ORAL at 08:50

## 2020-11-26 RX ADMIN — OXYCODONE HYDROCHLORIDE 10 MG: 5 TABLET ORAL at 03:16

## 2020-11-26 RX ADMIN — FAMOTIDINE 20 MG: 20 TABLET ORAL at 08:50

## 2020-11-26 RX ADMIN — CALCIUM CARBONATE-VITAMIN D TAB 500 MG-200 UNIT 1 TABLET: 500-200 TAB at 08:50

## 2020-11-26 RX ADMIN — METHOCARBAMOL 1000 MG: 500 TABLET ORAL at 03:16

## 2020-11-26 RX ADMIN — CALCIUM CARBONATE-VITAMIN D TAB 500 MG-200 UNIT 1 TABLET: 500-200 TAB at 21:48

## 2020-11-26 RX ADMIN — ONDANSETRON 4 MG: 2 INJECTION INTRAMUSCULAR; INTRAVENOUS at 22:26

## 2020-11-26 ASSESSMENT — PAIN DESCRIPTION - ORIENTATION
ORIENTATION: LOWER
ORIENTATION: LOWER
ORIENTATION: MID;LOWER

## 2020-11-26 ASSESSMENT — PAIN SCALES - GENERAL
PAINLEVEL_OUTOF10: 0
PAINLEVEL_OUTOF10: 0
PAINLEVEL_OUTOF10: 8
PAINLEVEL_OUTOF10: 3
PAINLEVEL_OUTOF10: 7
PAINLEVEL_OUTOF10: 3

## 2020-11-26 ASSESSMENT — PAIN DESCRIPTION - PROGRESSION
CLINICAL_PROGRESSION: GRADUALLY WORSENING
CLINICAL_PROGRESSION: NOT CHANGED
CLINICAL_PROGRESSION: GRADUALLY IMPROVING

## 2020-11-26 ASSESSMENT — PAIN DESCRIPTION - ONSET
ONSET: ON-GOING

## 2020-11-26 ASSESSMENT — PAIN DESCRIPTION - FREQUENCY
FREQUENCY: CONTINUOUS

## 2020-11-26 ASSESSMENT — PAIN DESCRIPTION - DESCRIPTORS
DESCRIPTORS: ACHING
DESCRIPTORS: ACHING;CRAMPING
DESCRIPTORS: ACHING

## 2020-11-26 ASSESSMENT — PAIN DESCRIPTION - LOCATION
LOCATION: BACK
LOCATION: BACK
LOCATION: BACK;ABDOMEN

## 2020-11-26 ASSESSMENT — PAIN DESCRIPTION - PAIN TYPE
TYPE: SURGICAL PAIN
TYPE: ACUTE PAIN
TYPE: SURGICAL PAIN

## 2020-11-26 ASSESSMENT — PAIN - FUNCTIONAL ASSESSMENT
PAIN_FUNCTIONAL_ASSESSMENT: PREVENTS OR INTERFERES SOME ACTIVE ACTIVITIES AND ADLS

## 2020-11-26 NOTE — PLAN OF CARE
Problem: Falls - Risk of:  Goal: Will remain free from falls  Description: Will remain free from falls  11/26/2020 0049 by Karen Hernandez RN  Outcome: Ongoing  Note: Patient will remain free from falls. Patient alert and orientated and uses call light appropriately. Patient up x1 with walker, tolerates ambulation well. Fall precautions in place. Bed locked and in lowest position, bed alarm on, nonskid socks on. Call light within reach. Problem: Pain:  Goal: Pain level will decrease  Description: Pain level will decrease  11/26/2020 0049 by Karen Hernandez RN  Outcome: Ongoing  Note: Patient pain controlled with oral medication and muscle relaxers. Patient able to rest comfortably in bed. Will continue to assess and monitor.

## 2020-11-26 NOTE — PROGRESS NOTES
Physical Therapy  Facility/Department: [facility] [address] 5T ORTHO/NEURO  Daily Treatment Note  NAME: Sofia Basurto  :   MRN: [id number]    Date of Service: 2020    Discharge Recommendations:    Sofia Basurto scored a 18/24 on the AM-PAC short mobility form. Current research shows that an AM-PAC score of 18 or greater is typically associated with a discharge to the patient's home setting. Based on the patient's AM-PAC score and their current functional mobility deficits, it is recommended that the patient have 2-3 sessions per week of Physical Therapy at d/c to increase the patient's independence. At this time, this patient demonstrates the endurance and safety to discharge home with  (home vs OP services) and a follow up treatment frequency of 2-3x/wk. Please see assessment section for further patient specific details. If patient discharges prior to next session this note will serve as a discharge summary. Please see below for the latest assessment towards goals. Assessment   Assessment: Pt POD1 post lumbar fusion, currently is below her normal level of functional mobility. Pt plans on return home with assist of spouse. Recommend continued therapies to maximize mobility and independence  Treatment Diagnosis: Decreased functional mobilty post lumbar fusion  PT Education: PT Role;Goals; General Safety;Gait Training;Transfer Training;Functional Mobility Training  Patient Education: Pt expresses understanding and will benefit from reinforcement  Activity Tolerance  Activity Tolerance: Patient Tolerated treatment well     Patient Diagnosis(es): The encounter diagnosis was S/P lumbar fusion. has a past medical history of Arthritis, Dental crowns present, and Walks frequently.    has a past surgical history that includes Tonsillectomy; Colonoscopy; Knee arthroscopy (Left, 11/3/15); cyst removal (Right); cyst removal (Left); and lumbar fusion (Right, 11/24/2020). Restrictions  Restrictions/Precautions  Restrictions/Precautions: Weight Bearing  Position Activity Restriction  Other position/activity restrictions: activity as tolerated, ambulate pt. HOB 30 deg at all times. Subjective   Subjective  Subjective: Pt seated in chair upon PT entry, agreeable to working with PT  Objective   Bed mobility  Supine to Sit: Stand by assistance(slow and effortful, pt unable to log roll as bed was stuck in upright position. New bed requested. Pt able to slide legs over the side without assist)  Transfers  Sit to Stand: Stand by assistance  Stand to sit: Stand by assistance  Ambulation  Ambulation?: Yes  Ambulation 1  Device: Rolling Walker  Assistance: Stand by assistance  Quality of Gait: slow and steady gait with RW, no LOB noted  Distance: 100 ft  Stairs/Curb  Stairs?: Yes(up and down 5 steps with left rail and SBA)     Balance  Comments: Good stability during functional mobility and gait with RW.   No LOB                AM-PAC Score  AM-PAC Inpatient Mobility Raw Score : 18 (11/26/20 1051)  AM-PAC Inpatient T-Scale Score : 43.63 (11/26/20 1051)  Mobility Inpatient CMS 0-100% Score: 46.58 (11/26/20 1051)  Mobility Inpatient CMS G-Code Modifier : CK (11/26/20 1051)          Goals  Short term goals  Time Frame for Short term goals: Discharge  ongoing 11/26  Short term goal 1: Mod I bed mobility  Short term goal 2: Mod I transfers  Short term goal 3: Ambulate 100 ft with RW and supervision  Short term goal 4: Up and down curb step with RW and SBA  Patient Goals   Patient goals : Return home    Plan    Plan  Times per week: 5-7  Current Treatment Recommendations: Functional Mobility Training, Transfer Training, Gait Training, Safety Education & Training  Safety Devices  Type of devices: Nurse notified, Chair alarm in place, Call light within reach, Left in chair     Therapy Time   Individual Concurrent Group Co-treatment   Time In 0910         Time Out 0940         Minutes 30               Timed Code Treatment Minutes:   30    Total Treatment Minutes:  Roya 40, [id number]

## 2020-11-26 NOTE — PROGRESS NOTES
Hospitalist Progress Note      PCP: Gabriel Cristina MD    Date of Admission: 11/24/2020    Chief Complaint:     No chief complaint on file. Back pain      Subjective:  Patient seen and examined at the bedside. No complaints at this time. Anticipate dc today. No barriers from internal medicine perspective. PFHS: reviewed as documented 11/24/2020, no changes    Medications:  Reviewed    Infusion Medications   Scheduled Medications    enoxaparin  40 mg Subcutaneous Daily    polyethylene glycol  17 g Oral Daily    sennosides-docusate sodium  2 tablet Oral BID    methocarbamol  1,000 mg Oral Q6H    sodium chloride flush  10 mL Intravenous 2 times per day    acetaminophen  650 mg Oral Q6H    famotidine  20 mg Oral BID    Oyster Shell Calcium/D  1 tablet Oral BID     PRN Meds: sodium chloride flush, promethazine **OR** ondansetron, oxyCODONE **OR** oxyCODONE, morphine **OR** morphine      Intake/Output Summary (Last 24 hours) at 11/26/2020 0910  Last data filed at 11/26/2020 1051  Gross per 24 hour   Intake 920 ml   Output --   Net 920 ml       Physical Exam    /75   Pulse 90   Temp 99 °F (37.2 °C) (Oral)   Resp 18   Ht 5' 5\" (1.651 m)   Wt 114 lb (51.7 kg)   SpO2 92%   BMI 18.97 kg/m²     General appearance:  No apparent distress, appears stated age  Eyes: Pupils equal, round, reactive to light, conjunctiva/corneas clear  Ears/Nose/Mouth/Throat: No external lesions or scars, hearing intact to voice  Neck: Trachea midline, no masses noted  Respiratory:  Normal respiratory effort. Clear to auscultation bilaterally  Cardiovascular: Regular rate and rhythm, nl S1/S2, w/o murmurs, rubs or gallops, no lower extremity edema  Abdomen: Soft, non-tender, non-distended, no hepatosplenomegaly  Musculoskeletal: No cyanosis, clubbing or petechiae, no lower extremity misalignment, asymmetry, or crepitation  Skin: Normal skin color, texture, turgor. No rashes or lesions noted.   Psychiatric: Alert and oriented x4, good insight and judgment    Labs:   Recent Labs     11/24/20  0912 11/25/20  0533   WBC 6.1 11.1*   HGB 15.0 12.9   HCT 44.9 39.4    213     Recent Labs     11/25/20  0533      K 4.4      CO2 28   BUN 14   CREATININE 0.7   CALCIUM 10.1     No results for input(s): AST, ALT, BILIDIR, BILITOT, ALKPHOS in the last 72 hours. Recent Labs     11/24/20  0912   INR 0.94     No results for input(s): Verlena Kishan in the last 72 hours. Urinalysis:    No results found for: Hussein Bourdon, BACTERIA, RBCUA, BLOODU, SPECGRAV, [address] [place] [address]94    Radiology:  XR LUMBAR SPINE (2-3 VIEWS)   Final Result      8 frontal and lateral coned down intraoperative views assume 5 lumbar type vertebral bodies. There are various stages of placement of an intervertebral spacer at L4-5. Fluoroscopy time 5 minutes      FLUORO FOR SURGICAL PROCEDURES   Final Result      8 frontal and lateral coned down intraoperative views assume 5 lumbar type vertebral bodies. There are various stages of placement of an intervertebral spacer at L4-5.       Fluoroscopy time 5 minutes          Assessment/Plan:    Active Hospital Problems    Diagnosis Date Noted    Spondylolisthesis of lumbar region [M43.16] 11/24/2020    S/P lumbar fusion [Z98.1] 11/24/2020       Plan:    # s/p RIGHT L4-5 LATERAL LUMBAR INTERBODY FUSION WITH PEDICLE SCREW FIXATION  -management as per primary     # Constipation  -increase bowel regimen     # elevated BP  -due to pain, has resolved now     # Other chronic conditions  -continue home management    DVT Prophylaxis: per primary  Diet: DIET GENERAL;  Code Status: Full Code    PT/OT Eval Status: per primary    Dispo: per primary    Alba Rea MD

## 2020-11-26 NOTE — PROGRESS NOTES
No acute changes overnight. Patient alert and orientated. VSS. Pain controlled with oral medication. Neuro status stable. Incision site is dry and intact, minimal drainage noted. Patient still complains of abdominal cramping, patient able to pass gas but has yet to have a BM. Patient up x1 with walker and gait belt to bathroom, tolerated well. Fall precautions in place. Call light within reach. Will continue to assess and monitor.

## 2020-11-26 NOTE — DISCHARGE SUMMARY
Physician Discharge Summary     Patient ID:  Herminia Toscano  [id number]  79 y.o.  1950    Admit date: 2020    Discharge date and time: 2020    Admitting Physician: Gisell Whyte MD     Discharge Physician:Malcolm Forde    Admission Diagnoses: Spinal stenosis, lumbar region with neurogenic claudication [M48.062]  Spondylolisthesis of lumbar region [M43.16]  Spondylolisthesis of lumbar region [M43.16]    Discharge Diagnoses: lumbar spondylolisthesis    Admission Condition: good    Discharged Condition: good    Indication for Admission:L4-5 fusion    Hospital Course: Patient admitted for L4-5 LLIF. Patient did well in postoperative period. On POD 3 her pain was well controlled and she was ambulating. Consults: hospitalist     Treatments: IV hydration    Discharge Exam:  Physical Exam:    Temp (24hrs), Av.9 °F (37.2 °C), Min:98.5 °F (36.9 °C), Max:99.4 °F (37.4 °C)      Neuro Exam  The patient is well-appearing and is in no acute distress. Alert and oriented ×4, appropriate, conversant with normal mood and affect. DTR 2+/4 symmetric. Michael sign neg BUE, Babinski sign is down-going BLE  Capillary refill is less than 2 s in all 4 extremities, pulses are strong and symmetric. Sensation is intact to light touch and temperature throughout. Romberg sign is negative. The patient walks with a well-balanced, well-coordinated gait.       RUE: Deltoids: 5/5, Triceps: 5/5, Biceps: 5/5, WE/WF: 5/5, Finger abd: 5/5, : 5/5   LUE: Deltoids: 5/5, Triceps: 5/5, Biceps: 5/5, WE/WF: 5/5, Finger abd: 5/5, : 5/5  LLE: HE: 5/5, HF: 5/5, KE:5/5, KF: 5/5, PF: /5, DF: 5/5  RLE: HE: 5/5, HF: 5/5, KE:5/5, KF: /5, PF: /5, DF: /5    Labs:  Recent Labs     20  0533   WBC 11.1*   HGB 12.9   HCT 39.4          Recent Labs     20  0533      K 4.4      CO2 28   BUN 14   CREATININE 0.7   GLUCOSE 130*   CALCIUM 10.1       Recent Labs     20  0912   PROTIME 10.9   INR 0. 94   APTT 33.9     Disposition: home    In process/preliminary results:  Outstanding Order Results     No orders found from 10/26/2020 to 11/25/2020. Patient Instructions:   Current Discharge Medication List      START taking these medications    Details   oxyCODONE (ROXICODONE) 5 MG immediate release tablet Take 1 tablet by mouth every 8 hours as needed for Pain for up to 7 days. Qty: 24 tablet, Refills: 0    Comments: Reduce doses taken as pain becomes manageable  Associated Diagnoses: S/P lumbar fusion      sennosides-docusate sodium (SENOKOT-S) 8.6-50 MG tablet Take 2 tablets by mouth 2 times daily  Qty:        polyethylene glycol (GLYCOLAX) 17 g packet Take 17 g by mouth daily  Qty: 527 g, Refills: 1      methocarbamol (ROBAXIN) 500 MG tablet Take 2 tablets by mouth every 6 hours as needed (Muscle spasms)  Qty: 80 tablet, Refills: 0    Associated Diagnoses: S/P lumbar fusion         CONTINUE these medications which have NOT CHANGED    Details   Multiple Vitamin (MULTIVITAMIN PO) Take by mouth      Calcium Carbonate-Vitamin D 300-100 MG-UNIT CAPS Take 1 tablet by mouth 2 times daily            Activity: activity as tolerated  Diet: regular diet  Wound Care: keep wound clean and dry    Follow-up with Dr Andie Aburto In 2 weeks    Signed:   JAMEEL Ang-FEDERICO   11/27/2020  11:39 AM

## 2020-11-26 NOTE — PROGRESS NOTES
Neurosurgery Progress Note    2020 6:18 AM                               Jag Rosales                      LOS: 2 days               Subjective:  No acute events overnight. Pain controlled. ambulating                         Physical Exam:    Temp (24hrs), Av.9 °F (37.2 °C), Min:98.5 °F (36.9 °C), Max:99.4 °F (37.4 °C)      Neuro Exam  The patient is well-appearing and is in no acute distress. Alert and oriented ×4, appropriate, conversant with normal mood and affect. DTR 2+/4 symmetric. Michael sign neg BUE, Babinski sign is down-going BLE  Capillary refill is less than 2 s in all 4 extremities, pulses are strong and symmetric. Sensation is intact to light touch and temperature throughout. Romberg sign is negative. The patient walks with a well-balanced, well-coordinated gait.       RUE: Deltoids: 5/5, Triceps: 5/5, Biceps: 5/5, WE/WF: 5/5, Finger abd: 5/5, : 5/5   LUE: Deltoids: 5/5, Triceps: 5/5, Biceps: 5/5, WE/WF: 5/5, Finger abd: 5/5, : 5/5  LLE: HE: 5/5, HF: 5/5, KE:5/5, KF: 5/5, PF: 5/5, DF: 5/5  RLE: HE: 5/5, HF: 5/5, KE:5/5, KF: 5/5, PF: 5/5, DF: 5/5    Labs:  Recent Labs     20  0533   WBC 11.1*   HGB 12.9   HCT 39.4          Recent Labs     20  0533      K 4.4      CO2 28   BUN 14   CREATININE 0.7   GLUCOSE 130*   CALCIUM 10.1       Recent Labs     20  0912   PROTIME 10.9   INR 0.94   APTT 33.9         Assessment and Plan:  Pt is a 79y.o. year old female POD 1 L4-5 LLIF    Dc home today

## 2020-11-26 NOTE — PROGRESS NOTES
Patient is alert and oriented, VSS. Neuro WNL, incisions are clean, dry and intact. Patient tolerating diet and voiding adequately. Patient complains of a sore throat from intubation, prefers softer foods and ice chips. Patient ambulating with walker, gait belt and SBA. Patient medicated per [facility] for pain control. Will continue to monitor.

## 2020-11-27 ENCOUNTER — APPOINTMENT (OUTPATIENT)
Dept: GENERAL RADIOLOGY | Age: 70
DRG: 455 | End: 2020-11-27
Attending: NEUROLOGICAL SURGERY
Payer: MEDICARE

## 2020-11-27 VITALS
OXYGEN SATURATION: 94 % | HEIGHT: 65 IN | DIASTOLIC BLOOD PRESSURE: 70 MMHG | RESPIRATION RATE: 17 BRPM | HEART RATE: 97 BPM | SYSTOLIC BLOOD PRESSURE: 117 MMHG | WEIGHT: 114 LBS | TEMPERATURE: 98.1 F | BODY MASS INDEX: 18.99 KG/M2

## 2020-11-27 PROCEDURE — 2580000003 HC RX 258: Performed by: NEUROLOGICAL SURGERY

## 2020-11-27 PROCEDURE — 74018 RADEX ABDOMEN 1 VIEW: CPT

## 2020-11-27 PROCEDURE — 6360000002 HC RX W HCPCS: Performed by: NEUROLOGICAL SURGERY

## 2020-11-27 PROCEDURE — 97535 SELF CARE MNGMENT TRAINING: CPT

## 2020-11-27 PROCEDURE — 6370000000 HC RX 637 (ALT 250 FOR IP): Performed by: NURSE PRACTITIONER

## 2020-11-27 PROCEDURE — 6370000000 HC RX 637 (ALT 250 FOR IP): Performed by: NEUROLOGICAL SURGERY

## 2020-11-27 PROCEDURE — 97116 GAIT TRAINING THERAPY: CPT

## 2020-11-27 RX ADMIN — CALCIUM CARBONATE-VITAMIN D TAB 500 MG-200 UNIT 1 TABLET: 500-200 TAB at 08:43

## 2020-11-27 RX ADMIN — FAMOTIDINE 20 MG: 20 TABLET ORAL at 08:43

## 2020-11-27 RX ADMIN — Medication 10 ML: at 08:46

## 2020-11-27 RX ADMIN — ENOXAPARIN SODIUM 40 MG: 40 INJECTION SUBCUTANEOUS at 08:42

## 2020-11-27 RX ADMIN — ACETAMINOPHEN 650 MG: 325 TABLET ORAL at 08:43

## 2020-11-27 RX ADMIN — POLYETHYLENE GLYCOL 3350 17 G: 17 POWDER, FOR SOLUTION ORAL at 08:43

## 2020-11-27 RX ADMIN — METHOCARBAMOL 1000 MG: 500 TABLET ORAL at 08:42

## 2020-11-27 ASSESSMENT — PAIN SCALES - GENERAL
PAINLEVEL_OUTOF10: 8
PAINLEVEL_OUTOF10: 2

## 2020-11-27 ASSESSMENT — PAIN DESCRIPTION - ONSET: ONSET: ON-GOING

## 2020-11-27 ASSESSMENT — PAIN DESCRIPTION - PAIN TYPE
TYPE: SURGICAL PAIN
TYPE: ACUTE PAIN

## 2020-11-27 ASSESSMENT — PAIN DESCRIPTION - FREQUENCY: FREQUENCY: CONTINUOUS

## 2020-11-27 ASSESSMENT — PAIN DESCRIPTION - LOCATION
LOCATION: BACK
LOCATION: ABDOMEN

## 2020-11-27 ASSESSMENT — PAIN DESCRIPTION - ORIENTATION
ORIENTATION: RIGHT;OUTER
ORIENTATION: LOWER

## 2020-11-27 ASSESSMENT — PAIN DESCRIPTION - DESCRIPTORS
DESCRIPTORS: ACHING;CRAMPING
DESCRIPTORS: ACHING

## 2020-11-27 ASSESSMENT — PAIN DESCRIPTION - PROGRESSION: CLINICAL_PROGRESSION: GRADUALLY WORSENING

## 2020-11-27 NOTE — PROGRESS NOTES
includes Tonsillectomy; Colonoscopy; Knee arthroscopy (Left, 11/3/15); cyst removal (Right); cyst removal (Left); and lumbar fusion (Right, 11/24/2020). Restrictions  Restrictions/Precautions  Restrictions/Precautions: Weight Bearing  Position Activity Restriction  Other position/activity restrictions: activity as tolerated, ambulate pt. HOB 30 deg at all times. Subjective   General  Chart Reviewed: Yes  Additional Pertinent Hx: 79 y.o. [facility] pain, a/w hip pain, persistent despite conservative treatment. RIGHT L4-5 LATERAL LUMBAR INTERBODY FUSION WITH PEDICLE SCREW FIXATION  Response to previous treatment: Patient with no complaints from previous session  Family / Caregiver Present: No  Diagnosis: Spinal stenosis, lumbar region with neurogenic claudication RIGHT L4-5 LATERAL LUMBAR INTERBODY FUSION WITH PEDICLE SCREW FIXATION  Subjective  Subjective: Pt seated in chair upon entry, very pleasant/motivated and agreeable to therapy session. General Comment  Comments: Pt sit to stand SBA, pt ambulated with rw at SBA ~20 ft to toilet in bathroom. Pt toilet transfer SBA and toileted SBA. Pt in stance at sink ~8 min at Supervision for grooming (oral care/wash face/wash hands/comb hair/apply makeup). Pt ambuated back to chair with rw at [facility]. Pt stand to sit SBA. Pt donned UB clothing bra/shirt at setup and LB - brief/pants/socks/sneakres with Min A to tie sneakers and SBA in stance to pull up. Pt used sock aid/reacher/LHSH. Call light in reach and chair alarm on.   Pain Assessment  Pain Level: 2  Pain Type: Surgical pain  Pain Location: Back  Pain Orientation: Lower  Pain Descriptors: Aching  Pre Treatment Pain Screening  Intervention List: Patient able to continue with treatment;Patient declined any intervention  Vital Signs  Patient Currently in Pain: Yes   Orientation  Orientation  Overall Orientation Status: Within Normal Limits  Objective    ADL  Equipment

## 2020-11-27 NOTE — PROGRESS NOTES
Physical Therapy  Daily Treatment Note    Discharge Recommendations: Pete Cavazos scored a 18/24 on the AM-PAC short mobility form. Current research shows that an AM-PAC score of 18 or greater is typically associated with a discharge to the patient's home setting. Based on the patient's AM-PAC score and their current functional mobility deficits, it is recommended that the patient have 2-3 sessions per week of Physical Therapy at d/c to increase the patient's independence. At this time, this patient demonstrates the endurance and safety to discharge home with home vs OP services and a follow up treatment frequency of 2-3x/wk. Please see assessment section for further patient specific details. Assessment:  Pt with good endurance for ambulation and good participation overall. Demonstrated understanding of log roll for bed mobility. Pt limited by back and abdominal discomfort, but only needed SB-CGA for mobility with walker at this time. Plan is for home with  and continued PT. No DME needs. Equipment Needs: No (pt has a wheeled walker at home)    Chart Reviewed: Yes   Restrictions/Precautions: Weight Bearing Other position/activity restrictions: activity as tolerated, ambulate pt. HOB 30 deg at all times. Additional Pertinent Hx: arthritis, knee scope      Diagnosis: Right L4-5 lateral lumbar interbody fusion with pedicle screw fixation   Treatment Diagnosis: Decreased functional mobilty post lumbar fusion    Subjective: Pt in chair initially. Agreeable to working with PT. \"I might go home today. \"    Pain: c/o abdominal tenderness, but some better since having a BM. Also 2/10 low back pain with activity. RN aware. Objective:    Bed mobility  Supine to sit: SBA, HOB flat. Slow and effortful. Via log roll. Sit to Supine: SBA, HOB flat. Slow and effortful. Via log roll.      Transfers  Sit to stand: SBA from chair; SBA from bed  Stand to sit: SBA into chair; SBA onto bed  Bed <> chair: SBA with

## 2020-11-27 NOTE — DISCHARGE INSTR - COC
Continuity of Care Form    Patient Name: Lily Evans   :  1950  MRN:  [id number]    [address] [place] [address] date:  2020  Discharge date:  ***    Code Status Order: Full Code   Advance Directives:   Advance Care Flowsheet Documentation     Date/Time Healthcare Directive Type of Healthcare Directive Copy in [address] Agent's Name Healthcare Agent's Phone Number    20 1628  Yes, patient has an advance directive for healthcare treatment  --  Yes, copy in chart -- -- --    20 1224  Yes, patient has an advance directive for healthcare treatment  Living will;Durable power of  for health care  No, copy requested from family -- -- --          Admitting Physician:  Reece Rahman MD  PCP: Aracely Robles MD    Discharging Nurse: [facility] Unit/Room#: 1275/6793-55  Discharging Unit Phone Number: ***    Emergency Contact:   Extended Emergency Contact Information  Primary Emergency Contact: Sydney Bauer  Address: [address] [place] [address] [address] [place], [address] [place] [address] Phone: [phone]  Mobile Phone: [phone]  Relation: Spouse    Past Surgical History:  Past Surgical History:   Procedure Laterality Date    COLONOSCOPY      CYST REMOVAL Right     arm    CYST REMOVAL Left     abod left eye    KNEE ARTHROSCOPY Left 11/3/15    with partial lateral meniscectomy    LUMBAR FUSION Right 2020    RIGHT L4-5 LATERAL LUMBAR INTERBODY FUSION WITH PEDICLE SCREW FIXATION performed by Reece Rahman MD at [address]         Immunization History:   Immunization History   Administered Date(s) Administered    Influenza Whole 10/09/2015       Active Problems:  Patient Active Problem List   Diagnosis Code    Knee pain, left M25.562    Lateral meniscus tear S83.289A    Spondylolisthesis of lumbar region M43.16    S/P lumbar fusion Z98.1       Isolation/Infection:   Isolation          No Isolation        Patient Infection Status     None to display          Nurse Assessment:  Last Vital Signs: /70   Pulse 97   Temp 98.1 °F (36.7 °C) (Oral)   Resp 17   Ht 5' 5\" (1.651 m)   Wt 114 lb (51.7 kg)   SpO2 94%   BMI 18.97 kg/m²     Last documented pain score (0-10 scale): Pain Level: 2  Last Weight:   Wt Readings from Last 1 Encounters:   11/24/20 114 lb (51.7 kg)     Mental Status:  {IP PT MENTAL STATUS:20030}    IV Access:  { ROBY IV ACCESS:892091273}    Nursing Mobility/ADLs:  Walking   {CHP DME GLXS:991525800}  Transfer  {CHP DME PPUT:233379336}  Bathing  {CHP DME XOUU:836378689}  Dressing  {CHP DME BTEE:555657064}  Toileting  {CHP DME ZUSV:359733040}  Feeding  {CHP DME FDZW:667235647}  Med Admin  {P DME ZBWT:622137228}  Med Delivery   { ROBY MED Delivery:199190672}    Wound Care Documentation and Therapy:  Incision 11/03/15 Knee Left (Active)   Number of days: 3940        Elimination:  Continence:   · Bowel: {YES / FV:48520}  · Bladder: {YES / GW:59323}  Urinary Catheter: {Urinary Catheter:334347964}   Colostomy/Ileostomy/Ileal Conduit: {YES / ZD:45979}       Date of Last BM: ***    Intake/Output Summary (Last 24 hours) at 11/27/2020 1326  Last data filed at 11/27/2020 0926  Gross per 24 hour   Intake 220 ml   Output 650 ml   Net -430 ml     I/O last 3 completed shifts:   In: 26 [P.O.:460; I.V.:10]  Out: 650 [Urine:650]    Safety Concerns:     [address] Safety Concerns:381008901}    Impairments/Disabilities:      [address] Impairments/Disabilities:531486138}    Nutrition Therapy:  Current Nutrition Therapy:   [address] Diet List:888554426}    Routes of Feeding: {CHP DME Other Feedings:906597976}  Liquids: {Slp liquid thickness:29225}  Daily Fluid Restriction: {CHP DME Yes amt example:108698867}  Last Modified Barium Swallow with Video (Video Swallowing Test): {Done Not Done ZU:945213174}    Treatments at the Time of Hospital Discharge:   Respiratory Treatments: ***  Oxygen Therapy:  {Therapy; copd oxygen:67074}  Ventilator:    {MH CC Vent HFLO:408634479}    Rehab Therapies: {THERAPEUTIC INTERVENTION:4818714482}  Weight Bearing Status/Restrictions: [address] Weight Bearin}  Other Medical Equipment (for information only, NOT a DME order):  {EQUIPMENT:776244992}  Other Treatments: ***    Patient's personal belongings (please select all that are sent with patient):  {CHP DME Belongings:682735510}    RN SIGNATURE:  {Esignature:740586589}    CASE MANAGEMENT/SOCIAL WORK SECTION    Inpatient Status Date: 2020    Readmission Risk Assessment Score:  Readmission Risk              Risk of Unplanned Readmission:        7         Discharging to Facility/ Agency     / signature: Electronically signed by Vlad Kulkarni RN on 20 at 1:29 PM EST    PHYSICIAN SECTION    Prognosis: {Prognosis:3936550105}    Condition at Discharge: [address] Patient Condition:443514944}    Rehab Potential (if transferring to Rehab): {Prognosis:0553196251}    Recommended Labs or Other Treatments After Discharge: ***    Physician Certification: I certify the above information and transfer of Pete Cavazos  is necessary for the continuing treatment of the diagnosis listed and that she requires {Admit to Appropriate Level of Care:77441} for {GREATER/LESS:726760396} 30 days.      Update Admission H&P: {CHP DME Changes in KMQU:554568477}    PHYSICIAN SIGNATURE:  {Esignature:427800387}

## 2020-11-27 NOTE — PLAN OF CARE
Problem: Falls - Risk of:  Goal: Will remain free from falls  Outcome: Ongoing   Pt remains free from injury during this shift. Pt is up with assistance x 1 person, gait belt and walker. Encourage pt to call for all assistance. Call light is in reach, bed alarm is activated, bed locked and in lowest position. Will continue to monitor. Problem: Pain:  Goal: Pain level will decrease  Outcome: Ongoing   Pt refusing to take any prn pain medications at this time. Encourage pt to call if pain is noted.

## 2020-11-27 NOTE — CARE COORDINATION
Case Management Assessment            Discharge Note                    Date / Time of Note: 11/27/2020 1:26 PM                  Discharge Note Completed by: Dustin Mcintosh    Patient Name: Kirsten Sanders   YOB: 1950  Diagnosis: Spinal stenosis, lumbar region with neurogenic claudication [M48.062]  Spondylolisthesis of lumbar region [M43.16]  Spondylolisthesis of lumbar region [M43.16]   Date / Time: 11/24/2020  7:28 AM    Current PCP: Rico Coronado MD  Clinic patient: No    Hospitalization in the last 30 days: No    Advance Directives:  Code Status: Full Code  [place] DNR form completed and on chart: Not Indicated    Financial:  Payor: MEDICARE / Plan: MEDICARE PART A AND B / Product Type: *No Product type* /      Pharmacy:    [place] - [address] [phone] [place] [phone]  [address] [facility]  Phone: [phone] Fax: [address] -  [phone]  [facility] [address]  Phone: [phone] Fax: [phone] medications?:    Assistance provided by Case Management: None at this time    Does patient want to participate in local refill/ meds to beds program?: No    Meds To Beds General Rules:  1. Can ONLY be done Monday- Friday between 8:30am-5pm  2. Prescription(s) must be in pharmacy by 3pm to be filled same day  3. Copy of patient's insurance/ prescription drug card and patient face sheet must be sent along with the prescription(s)  4. Cost of Rx cannot be added to hospital bill. If financial assistance is needed, please contact unit  or ;  or  CANNOT provide pharmacy voucher for patients co-pays  5.  Patients can then  the prescription on their way out of the hospital at discharge, or pharmacy can deliver to the bedside if staff is available. (payment due at time of pick-up or delivery - cash, check, or card accepted)     Able to afford home medications/ co-pay costs: Yes    ADLS:  Current PT AM-PAC Score: 18 /24  Current OT AM-PAC Score: 21 /24      DISCHARGE Disposition: Home- No Services Needed    LOC at discharge: Not Applicable  ROBY Completed: Yes    Notification completed in HENS/PAS?:  Not Applicable    IMM Completed:   Yes, Case management has presented and reviewed IMM letter #2 to the patient and/or family/ POA. Patient and/or family/POA verbalized understanding of their medicare rights and appeal process if needed. Patient and/or family/POA has signed, initialed and placed today's date (11-) and time (131pm) on IMM letter #2 on the the appropriate lines. Patient and/or family/POA, copy of letter offered and they are aware that this original copy of IMM letter #2 is available prior to discharge from the paper chart on the unit. Electronic documentation has been entered into epic for IMM letter #2 and original paper copy has been added to the paper chart at the nurses station.      Transportation:  Transportation PLAN for discharge: family   Mode of Transport: Private Car  Reason for medical transport: Not Applicable  Name of [address] [place] [address], [address]: Not Applicable  Time of Transport: when available    Transport form completed: Yes    Home Care:  [address] ordered at discharge: Yes  [address]: Not Applicable  Orders faxed: No    Durable Medical Equipment:  DME Provider: NA  Equipment obtained during hospitalization: NA    Home Oxygen and Respiratory Equipment:  Oxygen needed at discharge?: Not [address]: Not Applicable  Portable tank available for discharge?: Not Indicated    Dialysis:  Dialysis patient: No    Dialysis Center:  Not Applicable    Hospice Services:  Location: Not Applicable  Agency: Not Applicable    Consents signed: Not Indicated    Referrals made at [facility] for outpatient continued care:  Not

## 2020-11-27 NOTE — PROGRESS NOTES
Pt complaining of increased abd pain. Disscussed medications with pt, gave x2 senokots and pepcid. Pt immediately got nausous. Disposed of remainder medications. Walked pt in the prajapati and placed pt in the chair. Messaged Dr. Jus Parsons regarding an xray. All needs within reach. Will continue to monitor.

## 2020-11-27 NOTE — PROGRESS NOTES
Hospitalist Progress Note      PCP: Kyung Olvera MD    Date of Admission: 11/24/2020    Chief Complaint:     No chief complaint on file. Back pain    Subjective:  Patient seen and examined at the bedside. No complaints at this time. Bowel regimen increased yesterday, had a BM. Still has some abdominal fullness, but pain is improving. No barriers to dc from internal medicine perspective    PFHS: reviewed as documented 11/24/2020, no changes    Medications:  Reviewed    Infusion Medications   Scheduled Medications    enoxaparin  40 mg Subcutaneous Daily    polyethylene glycol  17 g Oral Daily    methocarbamol  1,000 mg Oral Q6H    sodium chloride flush  10 mL Intravenous 2 times per day    acetaminophen  650 mg Oral Q6H    famotidine  20 mg Oral BID    Oyster Shell Calcium/D  1 tablet Oral BID     PRN Meds: sodium chloride flush, promethazine **OR** ondansetron, oxyCODONE **OR** oxyCODONE, morphine **OR** morphine      Intake/Output Summary (Last 24 hours) at 11/27/2020 1009  Last data filed at 11/27/2020 0926  Gross per 24 hour   Intake 460 ml   Output 650 ml   Net -190 ml       Physical Exam    BP (!) 165/81   Pulse 93   Temp 99.5 °F (37.5 °C) (Axillary)   Resp 18   Ht 5' 5\" (1.651 m)   Wt 114 lb (51.7 kg)   SpO2 96%   BMI 18.97 kg/m²     General appearance:  No apparent distress, appears stated age  Eyes: Pupils equal, round, reactive to light, conjunctiva/corneas clear  Ears/Nose/Mouth/Throat: No external lesions or scars, hearing intact to voice  Neck: Trachea midline, no masses noted  Respiratory:  Normal respiratory effort. Clear to auscultation bilaterally  Cardiovascular: Regular rate and rhythm, nl S1/S2, w/o murmurs, rubs or gallops, no lower extremity edema  Abdomen: Soft, non-tender, non-distended, no hepatosplenomegaly  Musculoskeletal: No cyanosis, clubbing or petechiae, no lower extremity misalignment, asymmetry, or crepitation  Skin: Normal skin color, texture, turgor.  No rashes or lesions noted. Psychiatric: Alert and oriented x4, good insight and judgment    Labs:   Recent Labs     11/25/20  0533   WBC 11.1*   HGB 12.9   HCT 39.4        Recent Labs     11/25/20  0533      K 4.4      CO2 28   BUN 14   CREATININE 0.7   CALCIUM 10.1     No results for input(s): AST, ALT, BILIDIR, BILITOT, ALKPHOS in the last 72 hours. No results for input(s): INR in the last 72 hours. No results for input(s): Joeline Reeks in the last 72 hours. Urinalysis:    No results found for: Floyde Gerson, BACTERIA, RBCUA, BLOODU, SPECGRAV, [address] [place] [address]    Radiology:  XR LUMBAR SPINE (2-3 VIEWS)   Final Result      8 frontal and lateral coned down intraoperative views assume 5 lumbar type vertebral bodies. There are various stages of placement of an intervertebral spacer at L4-5. Fluoroscopy time 5 minutes      FLUORO FOR SURGICAL PROCEDURES   Final Result      8 frontal and lateral coned down intraoperative views assume 5 lumbar type vertebral bodies. There are various stages of placement of an intervertebral spacer at L4-5.       Fluoroscopy time 5 minutes      XR ABDOMEN (KUB) (SINGLE AP VIEW)    (Results Pending)       Assessment/Plan:    Active Hospital Problems    Diagnosis Date Noted    Spondylolisthesis of lumbar region [M43.16] 11/24/2020    S/P lumbar fusion [Z98.1] 11/24/2020       Plan:    # s/p RIGHT L4-5 LATERAL LUMBAR INTERBODY FUSION WITH PEDICLE SCREW FIXATION  -management as per primary    # Abdominal pain   # Constipation  -improved w/ increased bowel regimen     # elevated BP  -due to pain, encouraged her to ask for analgesics as needed     # Other chronic conditions  -continue home management    DVT Prophylaxis: per primary  Diet: DIET GENERAL;  Code Status: Full Code    PT/OT Eval Status: per primary    Dispo: per primary    Robbie Kim MD

## 2020-11-28 ENCOUNTER — CARE COORDINATION (OUTPATIENT)
Dept: CASE MANAGEMENT | Age: 70
End: 2020-11-28

## 2020-11-28 PROCEDURE — 1111F DSCHRG MED/CURRENT MED MERGE: CPT | Performed by: FAMILY MEDICINE

## 2020-11-28 NOTE — CARE COORDINATION
Renetta 45 Transitions Initial Follow Up Call    Call within 2 business days of discharge: Yes    Patient: Yonis Daugherty Patient : 1950   MRN: [id number]   Reason for Admission: S/P Spinal Fusion  Discharge Date: 20 RARS: Readmission Risk Score: 7      Last Discharge [facility]       Complaint Diagnosis Description Type Department Provider    20  S/P lumbar fusion Admission (Discharged) [address] Marie Domínguez MD           Spoke with: [facility]: [facility]     Non-face-to-face services provided:  Obtained and reviewed discharge summary and/or continuity of care documents  Education of patient/family/caregiver/guardian to support self-management-. Assessment and support for treatment adherence and medication management-.    Care Transitions 24 Hour Call    Schedule Follow Up Appointment with PCP:  Declined  Do you have any ongoing symptoms?:  Yes  Patient-reported symptoms:  Pain  Do you have a copy of your discharge instructions?:  Yes  Do you have all of your prescriptions and are they filled?:  Yes  Have you been contacted by a [facility] Pharmacist?:  No  Have you scheduled your follow up appointment?:  Yes  How are you going to get to your appointment?:  Car - family or friend to transport  Were you discharged with any Home Care or Post Acute Services:  No  Do you feel like you have everything you need to keep you well at home?:  Yes  Care Transitions Interventions  No Identified Needs       Summary  CTN spoke with patient this am for initial 24 hour discharge follow up CTN call. Patient states she is doing well, having lot's of pain. Pain rated about a 6/10 at this time. CTN encouraged patient to take all PRN's as prescribed. Patient states she is taking Tylenol and Robaxin as she feels the Oxycodone make her feel worse. CTN encouraged patient to drink plenty of fluids, continue with stool softeners and ear more foods high in fiber.   Patient states she is not having any   nausea, vomiting, fevers, chills, SOB or Cough. Appetite could be better, instructed to add boost or ensure if not eating. Incision healing well, no drainage, swelling or increased redness present. CTN and Patient completed 1111f, all medications filled and in home. CTN provided education on s/s that require medical attention and when to seek medical attention. CTN advised Pt of use of urgent care or physicians 24 hr access line if assistance is needed after hours or weekend. Pt denies any needs or concerns and is agreeable with additional calls. Follow Up  No future appointments.     Ayse Britt RN

## 2020-12-07 ENCOUNTER — HOSPITAL ENCOUNTER (INPATIENT)
Age: 70
LOS: 7 days | Discharge: HOME HEALTH | DRG: 862 | End: 2020-12-14
Attending: INTERNAL MEDICINE | Admitting: INTERNAL MEDICINE
Payer: MEDICARE

## 2020-12-07 PROBLEM — K68.12 PSOAS ABSCESS (HCC): Status: ACTIVE | Noted: 2020-12-07

## 2020-12-07 LAB
ALBUMIN SERPL-MCNC: 3.3 G/DL (ref 3.4–5)
ANION GAP SERPL CALCULATED.3IONS-SCNC: 10 MMOL/L (ref 3–16)
BUN BLDV-MCNC: 13 MG/DL (ref 7–20)
CALCIUM SERPL-MCNC: 9.9 MG/DL (ref 8.3–10.6)
CHLORIDE BLD-SCNC: 92 MMOL/L (ref 99–110)
CO2: 28 MMOL/L (ref 21–32)
CREAT SERPL-MCNC: <0.5 MG/DL (ref 0.6–1.2)
GFR AFRICAN AMERICAN: >60
GFR NON-AFRICAN AMERICAN: >60
GLUCOSE BLD-MCNC: 133 MG/DL (ref 70–99)
LACTIC ACID, SEPSIS: 0.9 MMOL/L (ref 0.4–1.9)
PHOSPHORUS: 3.1 MG/DL (ref 2.5–4.9)
POTASSIUM SERPL-SCNC: 4.1 MMOL/L (ref 3.5–5.1)
SODIUM BLD-SCNC: 130 MMOL/L (ref 136–145)

## 2020-12-07 PROCEDURE — 1200000000 HC SEMI PRIVATE

## 2020-12-07 PROCEDURE — 6370000000 HC RX 637 (ALT 250 FOR IP): Performed by: INTERNAL MEDICINE

## 2020-12-07 PROCEDURE — 80069 RENAL FUNCTION PANEL: CPT

## 2020-12-07 PROCEDURE — 83605 ASSAY OF LACTIC ACID: CPT

## 2020-12-07 PROCEDURE — 2580000003 HC RX 258: Performed by: INTERNAL MEDICINE

## 2020-12-07 PROCEDURE — 6360000002 HC RX W HCPCS: Performed by: INTERNAL MEDICINE

## 2020-12-07 RX ORDER — SODIUM CHLORIDE 0.9 % (FLUSH) 0.9 %
10 SYRINGE (ML) INJECTION EVERY 12 HOURS SCHEDULED
Status: DISCONTINUED | OUTPATIENT
Start: 2020-12-07 | End: 2020-12-14 | Stop reason: HOSPADM

## 2020-12-07 RX ORDER — ACETAMINOPHEN 650 MG/1
650 SUPPOSITORY RECTAL EVERY 6 HOURS PRN
Status: DISCONTINUED | OUTPATIENT
Start: 2020-12-07 | End: 2020-12-09

## 2020-12-07 RX ORDER — SODIUM CHLORIDE 0.9 % (FLUSH) 0.9 %
10 SYRINGE (ML) INJECTION PRN
Status: DISCONTINUED | OUTPATIENT
Start: 2020-12-07 | End: 2020-12-14 | Stop reason: HOSPADM

## 2020-12-07 RX ORDER — ACETAMINOPHEN 325 MG/1
650 TABLET ORAL EVERY 6 HOURS PRN
Status: DISCONTINUED | OUTPATIENT
Start: 2020-12-07 | End: 2020-12-14 | Stop reason: HOSPADM

## 2020-12-07 RX ADMIN — ACETAMINOPHEN 650 MG: 325 TABLET ORAL at 23:43

## 2020-12-07 RX ADMIN — Medication 10 ML: at 23:44

## 2020-12-07 RX ADMIN — CEFEPIME HYDROCHLORIDE 2 G: 2 INJECTION, POWDER, FOR SOLUTION INTRAVENOUS at 23:44

## 2020-12-07 ASSESSMENT — PAIN SCALES - GENERAL
PAINLEVEL_OUTOF10: 3
PAINLEVEL_OUTOF10: 6

## 2020-12-07 ASSESSMENT — PAIN DESCRIPTION - PAIN TYPE: TYPE: ACUTE PAIN;SURGICAL PAIN

## 2020-12-07 ASSESSMENT — PAIN DESCRIPTION - ONSET: ONSET: ON-GOING

## 2020-12-07 ASSESSMENT — PAIN DESCRIPTION - FREQUENCY: FREQUENCY: CONTINUOUS

## 2020-12-07 ASSESSMENT — PAIN - FUNCTIONAL ASSESSMENT: PAIN_FUNCTIONAL_ASSESSMENT: PREVENTS OR INTERFERES SOME ACTIVE ACTIVITIES AND ADLS

## 2020-12-07 ASSESSMENT — PAIN DESCRIPTION - PROGRESSION: CLINICAL_PROGRESSION: RAPIDLY WORSENING

## 2020-12-07 ASSESSMENT — PAIN DESCRIPTION - ORIENTATION: ORIENTATION: RIGHT

## 2020-12-07 ASSESSMENT — PAIN DESCRIPTION - LOCATION: LOCATION: ABDOMEN;BACK

## 2020-12-07 ASSESSMENT — PAIN DESCRIPTION - DESCRIPTORS: DESCRIPTORS: SHARP

## 2020-12-08 ENCOUNTER — APPOINTMENT (OUTPATIENT)
Dept: CT IMAGING | Age: 70
DRG: 862 | End: 2020-12-08
Attending: INTERNAL MEDICINE
Payer: MEDICARE

## 2020-12-08 PROBLEM — T81.42XA DEEP INCISIONAL SURGICAL SITE INFECTION: Status: ACTIVE | Noted: 2020-12-08

## 2020-12-08 LAB
ABO/RH: NORMAL
ALBUMIN SERPL-MCNC: 3.1 G/DL (ref 3.4–5)
ANION GAP SERPL CALCULATED.3IONS-SCNC: 9 MMOL/L (ref 3–16)
ANTIBODY SCREEN: NORMAL
APTT: 29.1 SEC (ref 24.2–36.2)
BASOPHILS ABSOLUTE: 0 K/UL (ref 0–0.2)
BASOPHILS RELATIVE PERCENT: 0.4 %
BUN BLDV-MCNC: 14 MG/DL (ref 7–20)
CALCIUM SERPL-MCNC: 9.5 MG/DL (ref 8.3–10.6)
CHLORIDE BLD-SCNC: 94 MMOL/L (ref 99–110)
CO2: 29 MMOL/L (ref 21–32)
CREAT SERPL-MCNC: 0.5 MG/DL (ref 0.6–1.2)
EOSINOPHILS ABSOLUTE: 0.1 K/UL (ref 0–0.6)
EOSINOPHILS RELATIVE PERCENT: 0.7 %
GFR AFRICAN AMERICAN: >60
GFR NON-AFRICAN AMERICAN: >60
GLUCOSE BLD-MCNC: 140 MG/DL (ref 70–99)
HCT VFR BLD CALC: 30.6 % (ref 36–48)
HEMOGLOBIN: 10.4 G/DL (ref 12–16)
INR BLD: 1.12 (ref 0.86–1.14)
LACTIC ACID, SEPSIS: 1.9 MMOL/L (ref 0.4–1.9)
LYMPHOCYTES ABSOLUTE: 0.8 K/UL (ref 1–5.1)
LYMPHOCYTES RELATIVE PERCENT: 9.1 %
MCH RBC QN AUTO: 30.1 PG (ref 26–34)
MCHC RBC AUTO-ENTMCNC: 34 G/DL (ref 31–36)
MCV RBC AUTO: 88.5 FL (ref 80–100)
MONOCYTES ABSOLUTE: 0.9 K/UL (ref 0–1.3)
MONOCYTES RELATIVE PERCENT: 10.4 %
NEUTROPHILS ABSOLUTE: 6.7 K/UL (ref 1.7–7.7)
NEUTROPHILS RELATIVE PERCENT: 79.4 %
PDW BLD-RTO: 13.2 % (ref 12.4–15.4)
PHOSPHORUS: 3.1 MG/DL (ref 2.5–4.9)
PLATELET # BLD: 452 K/UL (ref 135–450)
PMV BLD AUTO: 6.7 FL (ref 5–10.5)
POTASSIUM SERPL-SCNC: 3.8 MMOL/L (ref 3.5–5.1)
PROTHROMBIN TIME: 13 SEC (ref 10–13.2)
RBC # BLD: 3.46 M/UL (ref 4–5.2)
SODIUM BLD-SCNC: 132 MMOL/L (ref 136–145)
WBC # BLD: 8.4 K/UL (ref 4–11)

## 2020-12-08 PROCEDURE — 2580000003 HC RX 258: Performed by: INTERNAL MEDICINE

## 2020-12-08 PROCEDURE — 6370000000 HC RX 637 (ALT 250 FOR IP): Performed by: INTERNAL MEDICINE

## 2020-12-08 PROCEDURE — 87075 CULTR BACTERIA EXCEPT BLOOD: CPT

## 2020-12-08 PROCEDURE — 0K9N30Z DRAINAGE OF RIGHT HIP MUSCLE WITH DRAINAGE DEVICE, PERCUTANEOUS APPROACH: ICD-10-PCS | Performed by: NURSE PRACTITIONER

## 2020-12-08 PROCEDURE — 83605 ASSAY OF LACTIC ACID: CPT

## 2020-12-08 PROCEDURE — 85025 COMPLETE CBC W/AUTO DIFF WBC: CPT

## 2020-12-08 PROCEDURE — 6360000002 HC RX W HCPCS: Performed by: INTERNAL MEDICINE

## 2020-12-08 PROCEDURE — 36415 COLL VENOUS BLD VENIPUNCTURE: CPT

## 2020-12-08 PROCEDURE — 86850 RBC ANTIBODY SCREEN: CPT

## 2020-12-08 PROCEDURE — 80069 RENAL FUNCTION PANEL: CPT

## 2020-12-08 PROCEDURE — 87076 CULTURE ANAEROBE IDENT EACH: CPT

## 2020-12-08 PROCEDURE — 86901 BLOOD TYPING SEROLOGIC RH(D): CPT

## 2020-12-08 PROCEDURE — 99152 MOD SED SAME PHYS/QHP 5/>YRS: CPT

## 2020-12-08 PROCEDURE — 87205 SMEAR GRAM STAIN: CPT

## 2020-12-08 PROCEDURE — 86900 BLOOD TYPING SEROLOGIC ABO: CPT

## 2020-12-08 PROCEDURE — 99223 1ST HOSP IP/OBS HIGH 75: CPT | Performed by: INTERNAL MEDICINE

## 2020-12-08 PROCEDURE — 1200000000 HC SEMI PRIVATE

## 2020-12-08 PROCEDURE — 85730 THROMBOPLASTIN TIME PARTIAL: CPT

## 2020-12-08 PROCEDURE — 85610 PROTHROMBIN TIME: CPT

## 2020-12-08 PROCEDURE — 87070 CULTURE OTHR SPECIMN AEROBIC: CPT

## 2020-12-08 RX ORDER — LIDOCAINE HYDROCHLORIDE 20 MG/ML
5 INJECTION, SOLUTION EPIDURAL; INFILTRATION; INTRACAUDAL; PERINEURAL ONCE
Status: COMPLETED | OUTPATIENT
Start: 2020-12-08 | End: 2020-12-09

## 2020-12-08 RX ORDER — SODIUM CHLORIDE, SODIUM LACTATE, POTASSIUM CHLORIDE, CALCIUM CHLORIDE 600; 310; 30; 20 MG/100ML; MG/100ML; MG/100ML; MG/100ML
INJECTION, SOLUTION INTRAVENOUS CONTINUOUS
Status: ACTIVE | OUTPATIENT
Start: 2020-12-08 | End: 2020-12-08

## 2020-12-08 RX ORDER — MIDAZOLAM HYDROCHLORIDE 1 MG/ML
2 INJECTION INTRAMUSCULAR; INTRAVENOUS ONCE
Status: DISCONTINUED | OUTPATIENT
Start: 2020-12-08 | End: 2020-12-09

## 2020-12-08 RX ORDER — FENTANYL CITRATE 50 UG/ML
100 INJECTION, SOLUTION INTRAMUSCULAR; INTRAVENOUS ONCE
Status: DISCONTINUED | OUTPATIENT
Start: 2020-12-08 | End: 2020-12-09

## 2020-12-08 RX ORDER — DOCUSATE SODIUM 100 MG/1
100 CAPSULE, LIQUID FILLED ORAL 2 TIMES DAILY
Status: DISCONTINUED | OUTPATIENT
Start: 2020-12-08 | End: 2020-12-14 | Stop reason: HOSPADM

## 2020-12-08 RX ADMIN — CEFEPIME HYDROCHLORIDE 2 G: 2 INJECTION, POWDER, FOR SOLUTION INTRAVENOUS at 06:03

## 2020-12-08 RX ADMIN — CEFEPIME HYDROCHLORIDE 2 G: 2 INJECTION, POWDER, FOR SOLUTION INTRAVENOUS at 22:42

## 2020-12-08 RX ADMIN — VANCOMYCIN HYDROCHLORIDE 750 MG: 10 INJECTION, POWDER, LYOPHILIZED, FOR SOLUTION INTRAVENOUS at 04:54

## 2020-12-08 RX ADMIN — SODIUM CHLORIDE, POTASSIUM CHLORIDE, SODIUM LACTATE AND CALCIUM CHLORIDE: 600; 310; 30; 20 INJECTION, SOLUTION INTRAVENOUS at 12:14

## 2020-12-08 RX ADMIN — VANCOMYCIN HYDROCHLORIDE 750 MG: 10 INJECTION, POWDER, LYOPHILIZED, FOR SOLUTION INTRAVENOUS at 16:50

## 2020-12-08 RX ADMIN — ACETAMINOPHEN 650 MG: 325 TABLET ORAL at 18:19

## 2020-12-08 RX ADMIN — DOCUSATE SODIUM 100 MG: 100 CAPSULE ORAL at 22:46

## 2020-12-08 RX ADMIN — Medication 10 ML: at 20:08

## 2020-12-08 RX ADMIN — CEFEPIME HYDROCHLORIDE 2 G: 2 INJECTION, POWDER, FOR SOLUTION INTRAVENOUS at 15:49

## 2020-12-08 ASSESSMENT — PAIN SCALES - GENERAL
PAINLEVEL_OUTOF10: 2
PAINLEVEL_OUTOF10: 5
PAINLEVEL_OUTOF10: 2
PAINLEVEL_OUTOF10: 6
PAINLEVEL_OUTOF10: 6
PAINLEVEL_OUTOF10: 3
PAINLEVEL_OUTOF10: 2
PAINLEVEL_OUTOF10: 4
PAINLEVEL_OUTOF10: 2

## 2020-12-08 ASSESSMENT — PAIN DESCRIPTION - DESCRIPTORS
DESCRIPTORS: SORE
DESCRIPTORS: SHARP;SORE
DESCRIPTORS: SORE;SHARP

## 2020-12-08 ASSESSMENT — PAIN DESCRIPTION - FREQUENCY
FREQUENCY: CONTINUOUS

## 2020-12-08 ASSESSMENT — PAIN DESCRIPTION - PROGRESSION
CLINICAL_PROGRESSION: GRADUALLY IMPROVING
CLINICAL_PROGRESSION: GRADUALLY WORSENING
CLINICAL_PROGRESSION: GRADUALLY WORSENING

## 2020-12-08 ASSESSMENT — PAIN DESCRIPTION - ONSET
ONSET: ON-GOING

## 2020-12-08 ASSESSMENT — PAIN DESCRIPTION - LOCATION
LOCATION: BACK;ABDOMEN
LOCATION: BACK;ABDOMEN
LOCATION: BACK

## 2020-12-08 ASSESSMENT — PAIN DESCRIPTION - PAIN TYPE
TYPE: ACUTE PAIN
TYPE: ACUTE PAIN;SURGICAL PAIN
TYPE: ACUTE PAIN

## 2020-12-08 ASSESSMENT — PAIN DESCRIPTION - ORIENTATION
ORIENTATION: RIGHT

## 2020-12-08 ASSESSMENT — PAIN - FUNCTIONAL ASSESSMENT
PAIN_FUNCTIONAL_ASSESSMENT: PREVENTS OR INTERFERES SOME ACTIVE ACTIVITIES AND ADLS
PAIN_FUNCTIONAL_ASSESSMENT: PREVENTS OR INTERFERES SOME ACTIVE ACTIVITIES AND ADLS

## 2020-12-08 NOTE — PROGRESS NOTES
4 Eyes Admission Assessment     I agree as the admission nurse that 2 RN's have performed a thorough Head to Toe Skin Assessment on the patient. ALL assessment sites listed below have been assessed on admission. Areas assessed by both nurses:   [x]   Head, Face, and Ears   [x]   Shoulders, Back, and Chest  [x]   Arms, Elbows, and Hands   [x]   Coccyx, Sacrum, and Ischium  [x]   Legs, Feet, and Heels        -bruising noted to bilateral buttock and lower back surrounding previous surgical incisions    Does the Patient have Skin Breakdown?   No         Tee Prevention initiated:  No   Wound Care Orders initiated:  No      [facility] nurse consulted for Pressure Injury (Stage 3,4, Unstageable, DTI, NWPT, and Complex wounds) or Tee score 18 or lower:  No      Nurse 1 eSignature: Electronically signed by Precious Ritter RN on 12/7/20 at 10:29 PM EST    **SHARE this note so that the co-signing nurse is able to place an eSignature**    Nurse 2 eSignature: Electronically signed by Michael Samaniego RN on 12/8/20 at 4:09 AM EST

## 2020-12-08 NOTE — PROGRESS NOTES
New onset fever of 102.7, tylenol given. Perc drain still draining purulent drainage with red streaks, skin around insertion site dressing is red and warm. Dr. Tracy Pulido notified via perfect serve, will continue to monitor.     Electronically signed by Maria Luisa Marie RN on 12/8/2020 at 6:36 PM

## 2020-12-08 NOTE — CONSULTS
NEUROSURGERY CONSULT NOTE    Staci Conde  [id number]   1950 12/8/2020    Requesting physician: Cele Chavez MD    Reason for consultation: Post-op Abscess    History of present illness: Patient is a 79 y.o. female s/p RIGHT L4-5 LATERAL LUMBAR INTERBODY FUSION WITH PEDICLE SCREW FIXATION on 11/24/2020 by Dr. Kel Erickson presented on 12/7/2020 to [facility] ED c/o post-op fever and abdominal pain. Patient states on November 30 she began not feeling well and has had fevers up to 103 the last 6 days. She also c/o abdominal soreness and swelling since surgery. She does have surgical wounds to the lateral aspect of her abdomen on the right and also to her back. She denies any cough or shortness of breath. She denies any change in skin coloration. She denies any urinary symptoms. CT Abdomen/pelvis at ED revealed large complex right iliopsoas abscess and decission was made to transfer patient to [facility] for further w/u. ROS:   GENERAL:  Denies fever or recent illness.  Denies weight changes   EYES:  Denies vision change or diplopia  EARS:  Denies hearing loss  CARDIAC:  Denies chest pain  RESPIRATORY:  Denies shortness of breath  SKIN:  Denies rash or lesions   HEM:  Denies excessive bruising  PSYCH:  Denies anxiety or depression  NEURO:  Denies headache, numbness or tingling or lateralizing weakness   :  Denies urinary difficulty  GI: Abdominal soreness and swelling   MUSCULOSKELETAL:  No arthralgias    No Known Allergies    Past Medical History:   Diagnosis Date    Arthritis     Dental crowns present     Walks frequently     daily        Past Surgical History:   Procedure Laterality Date    COLONOSCOPY      CYST REMOVAL Right     arm    CYST REMOVAL Left     abod left eye    KNEE ARTHROSCOPY Left 11/3/15    with partial lateral meniscectomy    LUMBAR FUSION Right 11/24/2020    RIGHT L4-5 LATERAL LUMBAR INTERBODY FUSION WITH PEDICLE SCREW FIXATION performed by Jez Morris MD at [address] TONSILLECTOMY         Social History     Occupational History    Occupation: Retired   Tobacco Use    Smoking status: Never Smoker    Smokeless tobacco: Never Used   Substance and Sexual Activity    Alcohol use: No     Alcohol/week: 0.0 standard drinks     Comment: rarely    Drug use: No    Sexual activity: Not on file        Family History   Problem Relation Age of Onset    Anesth Problems Other     Arthritis Other     Heart Disease Other     High Blood Pressure Other     Stroke Other         Outpatient Medications Marked as Taking for the 12/7/20 encounter [facility] Encounter)   Medication Sig Dispense Refill    sennosides-docusate sodium (SENOKOT-S) 8.6-50 MG tablet Take 2 tablets by mouth 2 times daily      polyethylene glycol (GLYCOLAX) 17 g packet Take 17 g by mouth daily 527 g 1        Current Facility-Administered Medications   Medication Dose Route Frequency Provider Last Rate Last Dose    vancomycin (VANCOCIN) 750 mg in dextrose 5 % 250 mL IVPB  750 mg Intravenous Q12H Alisia Saleem MD   Stopped at 12/08/20 0601    sodium chloride flush 0.9 % injection 10 mL  10 mL Intravenous 2 times per day Alisia Saleem MD   10 mL at 12/07/20 2344    sodium chloride flush 0.9 % injection 10 mL  10 mL Intravenous PRN Alisia Saleem MD        acetaminophen (TYLENOL) tablet 650 mg  650 mg Oral Q6H PRN Alisia Saleem MD   650 mg at 12/07/20 2343    Or    acetaminophen (TYLENOL) suppository 650 mg  650 mg Rectal Q6H PRN Alisia Saleem MD        cefepime (MAXIPIME) 2 g IVPB minibag  2 g Intravenous Adriana Horowitz MD   Stopped at 12/08/20 3578        Objective:  /64   Pulse 90   Temp 97.8 °F (36.6 °C) (Oral)   Resp 16   Ht 5' 5\" (1.651 m)   Wt 118 lb 9.7 oz (53.8 kg)   SpO2 96%   BMI 19.74 kg/m²     Physical Exam:   Patient seen and examined  GCS:  4 - Opens eyes on own  5 - Alert and oriented  6 - Follows simple motor commands  General: Well developed.  Alert and cooperative in no acute distress. HENT: atraumatic, neck supple  Eyes: Optic discs: Not tested  Pulmonary: unlabored respiratory effort  Cardiovascular:  Warm well perfused. No peripheral edema  Gastrointestinal: abdomen soft, NT, ND    Neurological:  Mental Status: Awake, alert, oriented x 4, speech clear and appropriate  Attention: Intact  Language: No aphasia or dysarthria noted  Sensation: Intact to all extremities to light touch  Coordination: Intact    Musculoskeletal:   Gait: Not tested   Assist devices: None   Tone: Normal  Motor strength:    Right  Left    Right  Left    Deltoid  5 5  Hip Flex  5 5   Biceps  5 5  Knee Extensors  5 5   Triceps  5 5  Knee Flexors  5 5   Wrist Ext  5 5  Ankle Dorsiflex. 5 5   Wrist Flex  5 5  Ankle Plantarflex. 5 5   Handgrip  5 5  Ext Narendra Longus  5 5   Thumb Ext  5 5         Incisions: CDI    Radiological Findings:  CT Abd/Pelvis w contrast  OSH  Result Date: 12/7/2020  1. Large complex right iliopsoas abscess as described above. 2. Fluid collection with air-fluid level associated with large amount of ectopic gas along the right lateral abdominal wall. Labs:  Recent Labs     12/08/20  0600   WBC 8.4   HGB 10.4*   HCT 30.6*   *       Recent Labs     12/08/20  0600   *   K 3.8   CL 94*   CO2 29   BUN 14   CREATININE 0.5*   GLUCOSE 140*   CALCIUM 9.5   PHOS 3.1       Recent Labs     12/08/20  0600   PROTIME 13.0   INR 1.12   APTT 29.1       Patient Active Problem List    Diagnosis Date Noted    Psoas abscess ([facility] [address] 12/07/2020    Spondylolisthesis of lumbar region 11/24/2020    S/P lumbar fusion 11/24/2020    Knee pain, left 09/30/2015    Lateral meniscus tear 09/30/2015       Assessment:  Patient is a 79 y.o. female s/p RIGHT L4-5 LATERAL LUMBAR INTERBODY FUSION WITH PEDICLE SCREW FIXATION by Dr. Amy Obrien on 11/24/2020 now w/ large complex right iliopsoas abscess. Plan:  1. No emergent neurosurgical intervention indicated  2. Neurologic exams frequency: Q4H  3.  For change in exam MUST contact neurosurgery team along with critical care or primary team  4. IR to drain abscess and send cultures  5. ID consulted for abx management  6. Pain control: Managed by medical team  7. PT/OT consulted, appreciate recs  8. Advance diet / activity per primary team  9. Thank you for consult. Will follow inpatient. Please call with any questions or decline in neurological status    DISPO: Remain inpatient from neurosurgery standpoint. Dispo timing to be determined by primary team once patient is medically stable for discharge. Patient was seen and examined with Dr. Tamica Moran who agrees with above assessment and plan.      Electronically signed by: CHADWICK Vides, 12/8/2020 8:37 AM  [phone]

## 2020-12-08 NOTE — DISCHARGE INSTR - COC
Temp 99.2 °F (37.3 °C) (Oral)   Resp 18   Ht 5' 5\" (1.651 m)   Wt 118 lb 9.7 oz (53.8 kg)   SpO2 94%   BMI 19.74 kg/m²     Last documented pain score (0-10 scale): Pain Level: 2  Last Weight:   Wt Readings from Last 1 Encounters:   20 118 lb 9.7 oz (53.8 kg)     Mental Status:  {IP PT MENTAL STATUS:22908}    IV Access:  { ROBY IV ACCESS:300689401}    Nursing Mobility/ADLs:  Walking   {CHP DME GBFW:798998222}  Transfer  {CHP DME SOOS:444385594}  Bathing  {CHP DME WFWC:885779471}  Dressing  {CHP DME NNYH:325328392}  Toileting  {CHP DME MLE}  Feeding  {CHP DME CRHW:086089460}  Med Admin  {P DME CJHE:840173292}  Med Delivery   { ROBY MED Delivery:799378842}    Wound Care Documentation and Therapy:  Incision 11/03/15 Knee Left (Active)   Number of days: 1862        Elimination:  Continence:   · Bowel: {YES / YV:60515}  · Bladder: {YES / BT:69273}  Urinary Catheter: {Urinary Catheter:457095264}   Colostomy/Ileostomy/Ileal Conduit: {YES / OL:02511}       Date of Last BM: ***    Intake/Output Summary (Last 24 hours) at 2020 1618  Last data filed at 2020 1109  Gross per 24 hour   Intake 300 ml   Output 75 ml   Net 225 ml     I/O last 3 completed shifts: In: 300 [P.O.:240;  I.V.:10; IV Piggyback:50]  Out: 75 [Urine:75]    Safety Concerns:     [address] Safety Concerns:804943222}    Impairments/Disabilities:      [address] Impairments/Disabilities:261401067}    Nutrition Therapy:  Current Nutrition Therapy:   [address] Diet List:247667549}    Routes of Feeding: {CHP DME Other Feedings:806259102}  Liquids: {Slp liquid thickness:18823}  Daily Fluid Restriction: {CHP DME Yes amt example:352928555}  Last Modified Barium Swallow with Video (Video Swallowing Test): {Done Not Done [facility]:199420815}    Treatments at the Time of Hospital Discharge:   Respiratory Treatments: ***  Oxygen Therapy:  {Therapy; copd oxygen:53131}  Ventilator:    {MH CC Vent WK:095078208}    Rehab Therapies: {THERAPEUTIC INTERVENTION:2727060235}  Weight Bearing Status/Restrictions: [address] Leo CC Weight Bearin}  Other Medical Equipment (for information only, NOT a DME order):  {EQUIPMENT:435236196}  Other Treatments: ***    Patient's personal belongings (please select all that are sent with patient):  {CHP DME Belongings:041435713}    RN SIGNATURE:  {Esignature:006355231}    CASE MANAGEMENT/SOCIAL WORK SECTION    Inpatient Status Date: 2020    Readmission Risk Assessment Score:  Readmission Risk              Risk of Unplanned Readmission:        8           Discharging to Facility/ Agency   · [facility]-home care-     [address] [address]         Phone: [phone]       Fax: [phone]        ·   · [facility]- home IV therapy supplies  Jayashree Mcconnell [address] [place] [address]         Phone: [phone]       Fax: [phone]        ·     / signature: Electronically signed by Charla Sever, RN on 20 at 5:11 PM EST    PHYSICIAN SECTION    Prognosis: Good    Condition at Discharge: Stable    Rehab Potential (if transferring to Rehab): Good    Recommended Labs or Other Treatments After Discharge:   INFUSION ORDERS:  Invanz 1 gm iv daily through 21  - First dose given in hospital  - PICC   - Disposition / date discharge  - Check CBC w diff, CMP, ESR, CRP every Mon or Tue - FAX result to [phone]  - Call with antibiotic / infusion issues, [phone]  - No f/u in outpatient ID office necessary    - Percutaneous drain along right flank.   Please flush BID with 10 cc of normal saline and gentle aspirate back until resistance is felt  - Keep a log of the output from the drain daily and bring this to your follow up appointment with Dr. Enriquez Rolling:   - Judeen Free in place till follow up, please flush drain twice a day with 10 cc normal saline flush, instill into the abdomen, turn stop cock towards bag pull back gently until no fluid comes into syringe, turn stop cock toward the abdomen and flush into bag. Please keep a daily record of output. Once flush complete, return stop cock towards the off position and cap the end. Drain Output Log:  Date Time Amount                                                                                                                       - Please make a follow up appointment with Dr. Kelsy Castellanos in 1 week. Please call the office at [phone] to schedule the appointment. - change drain dressing as needed with dry woven guaze and tegaderm     Physician Certification: I certify the above information and transfer of Andria Dennison  is necessary for the continuing treatment of the diagnosis listed and that she requires Home Care for greater 30 days.      Update Admission H&P: No change in H&P    PHYSICIAN SIGNATURE:  Electronically signed by Linh Burgess MD on 12/9/20 at 11:40 PM EST

## 2020-12-08 NOTE — CONSULTS
Infectious Diseases Inpatient Consult Note    Medical Student note - reviewed and modified, see Attending addendum at bottom    Reason for Consult:   Post-operative R iliopsoas abscess  Requesting Physician:   Dr. Missy Coles  Primary Care Physician:  Rosaura Aquino MD  History Obtained From:   Pt, EPIC    Admit Date: 12/7/2020  Hospital Day: 2    CHIEF COMPLAINT:     Back infection    HISTORY OF PRESENT ILLNESS:      Sandra Burciaga is a 78 yo female patient with PMHx of arthritis, spinal stenosis with neurogenic claudication, and lumbar spondylolisthesis s/p right L4-5 lateral lumbar interbody fusion with pedicle screw fixation on 11/24 at [address] [place] [address] spine [facility].    11/27 Pt was discharged in stable condition POD #3 w planned neurosurgery f/u in 2 weeks. 12/7 Pt presented to [facility] ED with 1 week history of fevers (Tmax 103 at home) with abdominal pain and swelling. Pt attempted to see PCP and was instructed to go to the ED dt the fever. Pt stated that alongside fevers, she noticed bilateral leg swelling. Doppler US was negative for DVT. CT abd/pelvis showed a large complex R iliopsoas abscess. Pt was then transferred to [place] [address] for further workup    12/8 Pt was seen after CT guided abscess drainage. Blood-tinged purulent fluid continuing to drain. Pt had low-grade temp of 100 F. Tachycardic ranging from . Pt states that her pain is only present near the incision on the right sided and is 2/10 in severity.     Past Medical History:    Past Medical History:   Diagnosis Date    Arthritis     Dental crowns present     Walks frequently     daily       Past Surgical History:    Past Surgical History:   Procedure Laterality Date    COLONOSCOPY      CYST REMOVAL Right     arm    CYST REMOVAL Left     abod left eye    KNEE ARTHROSCOPY Left 11/3/15    with partial lateral meniscectomy    LUMBAR FUSION Right 11/24/2020    RIGHT L4-5 LATERAL LUMBAR INTERBODY FUSION WITH PEDICLE SCREW FIXATION performed by Beverley Kamara MD at [address]         Current Medications:     vancomycin  750 mg Intravenous Q12H    sodium chloride flush  10 mL Intravenous 2 times per day    cefepime  2 g Intravenous Q8H       Allergies:  Patient has no known allergies. Social History:    TOBACCO:    Never  ETOH:    Denies  DRUGS:   Denies  MARITAL STATUS:     OCCUPATION:   Retired    Patient lives at home    Family History:   No immunodeficiency    REVIEW OF SYSTEMS:    No fever / chills / sweats. No weight loss. No visual change, eye pain, eye discharge. Dry mouth. Denies cough / sputum. Denies chest pain, palpitations. Denies n / v / .  Mild constipation  Having some trouble urinating. No dysuria or discomfort   Denies joint swelling or pain. No myalgia, arthralgia. Denies skin changes, itching  Denies focal weakness, sensory change or other neurologic symptom    Denies new / worse depression, psychiatric symptoms    PHYSICAL EXAM:      Vitals:    /64   Pulse 90   Temp 97.8 °F (36.6 °C) (Oral)   Resp 16   Ht 5' 5\" (1.651 m)   Wt 118 lb 9.7 oz (53.8 kg)   SpO2 96%   BMI 19.74 kg/m²     GENERAL: No apparent distress. HEENT: Membranes moist, no oral lesion  NECK:  Supple, no lymphadenopathy  LUNGS: Clear b/l, no rales, no dullness  CARDIAC: RRR, no murmur appreciated  ABD:  + BS, soft.  Right sided drain present - dressed with mild surrounding erythema and tenderness  EXT:  No rash, no edema, no lesions  NEURO: No focal neurologic findings  PSYCH: Orientation, sensorium, mood normal  LINES:  Peripheral iv    DATA:    Lab Results   Component Value Date    WBC 8.4 12/08/2020    HGB 10.4 (L) 12/08/2020    HCT 30.6 (L) 12/08/2020    MCV 88.5 12/08/2020     (H) 12/08/2020     Lab Results   Component Value Date    CREATININE 0.5 (L) 12/08/2020    BUN 14 12/08/2020     (L) 12/08/2020    K 3.8 12/08/2020    CL 94 (L) 12/08/2020    CO2 29 12/08/2020       Hepatic fevers and abdominal pain/tenderness  - CT A/P showing complex right iliopsoas abscess  - R sided drain present - purulent fluid    RECOMMENDATIONS:    Continue vancomycin and cefepime  Drained fluid sent fluid for culture/sensitivities  Monitor VS, CBC    Discussed with pt  Madisyn Zimmerman MS IV    Addendum to Medical Student Consult note:  Pt seen,examined and evaluated. I have independently performed history, physical exam, lab and data review. I have determined assessment and plan as documented by student ([address] [place] [address]). 78 yo woman with hx spinal stenosis, OA  Hx R l4/5 lumbar fusion 11/24 ([facility], Dr Tatianna Guzman). Presents with [facility] with 1 week fever / R side pain. CT with 'large R complex ileopsoas abscess'. At [facility] CoV-2 PCR neg.     Transferred to [facility] and admit 12/7 -- T 99.6, WBC 8.4  Today 12/8 - IR aspiration pus, placed drain    IMP/  Lumbar SSI - s/p aspiration / drain    REC/  Cont vancomycin + cefepime for now  Will f/u on abscess cult  Drain per Neurosurg / IR  Will review CT (will see if available on PACS from [place])    Anticipate long course of antibiotics (SSI with hardware)  PICC    Discussed with pt   Tl Ahn MD

## 2020-12-08 NOTE — PLAN OF CARE
Problem: Pain:  Goal: Control of acute pain  Description: Control of acute pain  Outcome: Ongoing   Pt c/o lower back/right sided abdominal pain, stating pain is worse when ambulating. PRN tylenol administered with relief. Ice packs used PRN. Problem: Falls - Risk of:  Goal: Will remain free from falls  Description: Will remain free from falls  Outcome: Ongoing   Pt is considered a fall risk. Pt is up with assistance x 1 with walker CGA, wearing non-skid socks. Fall precautions are in place. Bed remains locked, in lowest position with 2/4 side rails up. Call light and bedside table remain within reach. Bed alarm engaged. Problem: Infection - Surgical Site:  Goal: Will show no infection signs and symptoms  Description: Will show no infection signs and symptoms  Outcome: Ongoing   Previous surgical incisions remain CDI. VSS with mild tachycardia.

## 2020-12-08 NOTE — PROGRESS NOTES
Pt a/o x4, VSS. Lung sounds clear. Perc drain placed today, insertion site CDI. Moderate purulent output from drain. Surgical sites from lumbar fusion CDI with no redness or swelling noted. Pt is log rolling appropriately and avoiding bending, lifting, and twisting. Pain is being well controlled with ice packs and rest. Pt is tolerating general diet and voiding appropriately. All pt needs met at this time, will continue to monitor.     Electronically signed by Radha Greenfield RN on 12/8/2020 at 4:36 PM

## 2020-12-08 NOTE — H&P
Internal Medicine Progress Note        PCP: Janneth Cardenas MD    Date of Admission: 12/7/2020    Chief Complaint: Abdominal pain, fevers, leg swelling     Hospital Course: 79year old female with PMHx of arthritis, spinal stenosis with neurogenic claudication, and lumbar spondylolisthesis who underwent right L4-5 lateral lumbar fusion with pedicle screw fixation on 11/24/20 with Dr. Brendan Escoto at the [facility]. She presented to [facility] ED c/o 7 day h/o fevers, abdominal pain and bilateral leg swelling, that have been getting progressively worse. Patient states surgery went well and there were no complications. Approximately 1 week ago, she began having fevers, with Tmax 103, associated with abdominal swelling and pain. Patient states her  has been taking care of her surgical wounds and says they do not appear infected. Denies any skin changes or discoloration. She states that she has been having difficulty with urination, with decreased frequency as well as decreased output when she does go. Patient has also had swelling of bilateral feet and legs that improves with elevation. In [facility] ED, temperature was 99.6, VSS. Labs were significant for Na of 130, , WBC 11.1. Lactic acid wnl. CT abd/pelvis at [facility] ED revealed a large complex right iliopsoas abscess and decision was made to transfer patient to [facility] for further w/u. Subjective:    No acute events overnight. Patient seen this morning at bedside stating she feels similar to when she came in last night. Endorses abdominal and back pain this morning. She states she is having difficulty urinating for the past couple of days, with decreased frequency, and when she is able to go, there is decreased output. Endorses loose BM this morning. Afebrile, VSS. IR to drain abscess today.      Medications:  Reviewed    Infusion Medications   Scheduled Medications    vancomycin  750 mg Intravenous Q12H    fentanNYL  100 3.1     No results for input(s): AST, ALT, BILIDIR, BILITOT, ALKPHOS in the last 72 hours. Recent Labs     12/08/20  0600   INR 1.12     No results for input(s): Zettie Binet in the last 72 hours. Urinalysis:    No results found for: Mickey [facility] [address], [address] [facility], [place], [address], Mickey [facility] [address]    Radiology:  CT DRAINAGE HEMATOMA/SEROMA/FLUID COLLECTION    (Results Pending)       Assessment/Plan:    Sergey David, 79 y.o. female w/ PMHx of arthritis, spinal stenosis with neurogenic claudication, and lumbar spondylolisthesis who underwent a right L4-5 lateral lumbar fusion with pedicle screw fixation on 11/24/2. Admitted for a large complex right iliopsoas abscess. Active Hospital Problems    Diagnosis Date Noted    Psoas abscess [facility]) [K68.12] 12/07/2020     Psoas abscess s/p L4-L5 lumbar fusion   Recent surgery with incision on right side. 1 week hx of spiking fevers (Tmax 103), abdominal pain and swelling, fatigue, malaise, pain with ambulation. CT abd/pelvis showed ectopic gas along right lateral abdominal wall musculature, fluid collection in the right lateral abdominal wall with air-fluid level measuring 8.5 x 2.4 cm. Complex rim-enhancing fluid collection in the right ileal psoas muscle c/w large iliopsoas abscess. - Neurosurgery consulted  - Continue cefepime 2g IV q8h  - Continue vancomycin 750 mg IV q12h  - IR to drain abscess   - Will send fluid for culture and sensitivities   - ID consulted for abx management  - Pain control as needed  - PTOT to see   - NPO now, advance diet as tolerated   - Strict I/O's, monitor UOP    s/p right L4-L5 lateral lumbar fusion with pedicle screw fixation   - Neurosurgery consulted    DVT Prophylaxis: SCDs  Diet: Diet NPO, After Midnight  Code Status: Full Code    PT/OT Eval Status: Consulted  Dispo - Inpatient, when clinically improved.      I will discuss the patient with the senior resident and MD Maribell Samaniego, MS4 as scribe for Dr. Lani Lee MD [place].       I have discussed the care of the patient including pertinent history and exam findings, with the resident. I have seen and examined the patient and the key elements of all parts of the encounter have been performed by me. I agree with the assessment, plan and orders as documented by the resident.  Any additions or clarifications are noted below.     This note was written with the medical student acting as a scribe     [facility]  Attending Physician Detail Level: Detailed Hide Additional Notes?: No

## 2020-12-08 NOTE — PROCEDURES
IR Brief Postoperative Note    Caden Cates  YOB: 1950  [id number]    Pre-operative Diagnosis: psoas abscess    Post-operative Diagnosis: Same    Procedure: Rt psoas drain    Anesthesia: moderate    Surgeons/Assistants: samira    Estimated Blood Loss: Minimal    Complications: none    Specimens: were obtained--15 cc blood tinged purulent fluid    See full procedure dictation to follow      Arun Santa MD  12/8/2020

## 2020-12-08 NOTE — CONSULTS
Clinical Pharmacy Progress Note    Admit date: 12/7/2020     Subjective/Objective:  Pt is a 76yof with PMHx that includes arthritis who is s/p Right L4-L5 lumbar fusion surgery on 11/24/20. Pt now presents with fevers and leg / abdominal swelling. Pharmacy is consulted to dose Vancomycin per Dr. Lidya Warren    Current antibiotics:  Cefepime 2g IV q8h - day #1  Vancomycin - Pharmacy to dose -day #1   750mg IV q12h (12/8 - current)      Recent Labs     12/07/20  2255 12/08/20  0600   * 132*   K 4.1 3.8   CL 92* 94*   CO2 28 29   BUN 13 14   CREATININE <0.5* 0.5*   GLUCOSE 133* 140*       Estimated Creatinine Clearance: 89 mL/min (A) (based on SCr of 0.5 mg/dL (L)). Lab Results   Component Value Date    WBC 8.4 12/08/2020    HGB 10.4 (L) 12/08/2020    HCT 30.6 (L) 12/08/2020    MCV 88.5 12/08/2020     (H) 12/08/2020       Lab Results   Component Value Date    PROTIME 13.0 12/08/2020    INR 1.12 12/08/2020       Height:  5' 5\" (165.1 cm)  Weight:  118 lb 9.7 oz (53.8 kg)    Culture results:  None available    Prophylaxis:  VTE:  On hold pending neurosurgery evaluation  GI:  Not indicated      Assessment/Plan:  1)  Fevers / leg swelling s/p L4-L5 lumbar fusion 2 weeks ago:  Cefepime + Vancomycin - day #1  · Cefepime -   Current dose remains appropriate based on indication and current renal function. Will continue to monitor and adjust as needed per [facility] Renal Dose Adjustment Policy. · Vancomycin - Pharmacy to dose  · Based on estimated kinetics, will start 750mg IV q12h. · Clinical condition will be monitored closely, and levels will be ordered as clinically indicated. 2)  Glucose:  · Glucose on AM labs = 140 - unsure if fasting? If fasting glucoses remain > 125, may consider checking a HgA1c to evaluate for possible undiagnosed diabetes or pre-diabetes.     Please call with questions--  Thanks--  Jennifer Gupta, PharmD, [address], [facility]  [phone] or [id number] ([facility])   12/8/2020 8:06 AM

## 2020-12-08 NOTE — PROGRESS NOTES
Internal Medicine Progress Note        PCP: Janneth Cardenas MD    Date of Admission: 12/7/2020    Chief Complaint: Abdominal pain, fevers, leg swelling     Hospital Course: 79year old female with PMHx of arthritis, spinal stenosis with neurogenic claudication, and lumbar spondylolisthesis who underwent right L4-5 lateral lumbar fusion with pedicle screw fixation on 11/24/20 with Dr. Brendan Escoto at the [facility]. She presented to [facility] ED c/o 7 day h/o fevers, abdominal pain and bilateral leg swelling, that have been getting progressively worse. Patient states surgery went well and there were no complications. Approximately 1 week ago, she began having fevers, with Tmax 103, associated with abdominal swelling and pain. Patient states her  has been taking care of her surgical wounds and says they do not appear infected. Denies any skin changes or discoloration. She states that she has been having difficulty with urination, with decreased frequency as well as decreased output when she does go. Patient has also had swelling of bilateral feet and legs that improves with elevation. In [facility] ED, temperature was 99.6, VSS. Labs were significant for Na of 130, , WBC 11.1. Lactic acid wnl. CT abd/pelvis at [facility] ED revealed a large complex right iliopsoas abscess and decision was made to transfer patient to [facility] for further w/u. Subjective:    No acute events overnight. Patient seen this morning at bedside stating she feels similar to when she came in last night. Endorses abdominal and back pain this morning. She states she is having difficulty urinating for the past couple of days, with decreased frequency, and when she is able to go, there is decreased output. Endorses loose BM this morning. Afebrile, VSS. IR to drain abscess today.      Medications:  Reviewed    Infusion Medications   Scheduled Medications    vancomycin  750 mg Intravenous Q12H    fentanNYL  100 mcg Intravenous Once    midazolam  2 mg Intravenous Once    lidocaine PF  5 mL Intradermal Once    sodium chloride flush  10 mL Intravenous 2 times per day    cefepime  2 g Intravenous Q8H     PRN Meds: sodium chloride flush, acetaminophen **OR** acetaminophen      Intake/Output Summary (Last 24 hours) at 12/8/2020 1057  Last data filed at 12/8/2020 1011  Gross per 24 hour   Intake 300 ml   Output 75 ml   Net 225 ml       Physical Exam Performed:    /68   Pulse 114   Temp 97.8 °F (36.6 °C) (Oral)   Resp 18   Ht 5' 5\" (1.651 m)   Wt 118 lb 9.7 oz (53.8 kg)   SpO2 97%   BMI 19.74 kg/m²     General appearance: No apparent distress, appears stated age and cooperative. HEENT: Pupils equal, round, and reactive to light. Conjunctivae/corneas clear. Neck: Supple, with full range of motion. No jugular venous distention. Trachea midline. Respiratory:  Normal respiratory effort. Clear to auscultation, bilaterally without Rales/Wheezes/Rhonchi. Cardiovascular: Regular rate and rhythm with normal S1/S2 without murmurs, rubs or gallops. Abdomen: Soft, non-distended with normal bowel sounds. Tender to palpation in RLQ. Musculoskeletal: No clubbing, cyanosis or edema bilaterally. Full range of motion without deformity. Skin: Skin color, texture, turgor normal.  No rashes or lesions. Lower back and flank bandages C/D/I. Do not appear erythematous or have drainage. Neurologic:  Neurovascularly intact without any focal sensory/motor deficits.  Cranial nerves: II-XII intact, grossly non-focal.  Psychiatric: Alert and oriented, thought content appropriate, normal insight  Capillary Refill: Brisk,< 3 seconds   Peripheral Pulses: +2 palpable, equal bilaterally       Labs:   Recent Labs     12/08/20  0600   WBC 8.4   HGB 10.4*   HCT 30.6*   *     Recent Labs     12/07/20  2255 12/08/20  0600   * 132*   K 4.1 3.8   CL 92* 94*   CO2 28 29   BUN 13 14   CREATININE <0.5* 0.5*   CALCIUM 9.9 9.5   PHOS 3.1 Roldan Vega MD.

## 2020-12-08 NOTE — PROGRESS NOTES
Pt arrived to 5311 via wheelchair as direct admit. Assisted to bathroom x1 with walker CGA. Pt tolerated well. Pt urinated and had a small BM. Bruising noted to lower back around surgical incisions and buttock. Oriented to room, call light and beside table within reach. Bed alarm engaged. Dr. Daniela vallejo served making aware of pt's arrival. Awaiting orders at this time. Will continue to monitor.

## 2020-12-08 NOTE — H&P
Patient:  Bismark Huynh   :   1950      Relevant clinical history, particularly as it involves the pending procedure, was reviewed and discussed. The procedure including risks and benefits was discussed at length with the patient (or designated family member) and all questions were answered. Informed consent to proceed with the procedure was given. Vital signs were monitored and documented by the Radiology nurse. Targeted physical examination  Heart : regular rate and rhythm  Lungs : clear, breathing easily  Condition : stable    [facility] nurses notes reviewed and agreed. Past Medical History:        Diagnosis Date    Arthritis     Dental crowns present     Walks frequently     daily       Past Surgical History:           Procedure Laterality Date    COLONOSCOPY      CYST REMOVAL Right     arm    CYST REMOVAL Left     abod left eye    KNEE ARTHROSCOPY Left 11/3/15    with partial lateral meniscectomy    LUMBAR FUSION Right 2020    RIGHT L4-5 LATERAL LUMBAR INTERBODY FUSION WITH PEDICLE SCREW FIXATION performed by Olivia Mahoney MD at [facility]:  Patient has no known allergies. Medications:   Home Meds  No current facility-administered medications on file prior to encounter.       Current Outpatient Medications on File Prior to Encounter   Medication Sig Dispense Refill    sennosides-docusate sodium (SENOKOT-S) 8.6-50 MG tablet Take 2 tablets by mouth 2 times daily      polyethylene glycol (GLYCOLAX) 17 g packet Take 17 g by mouth daily 527 g 1    Multiple Vitamin (MULTIVITAMIN PO) Take by mouth      Calcium Carbonate-Vitamin D 300-100 MG-UNIT CAPS Take 1 tablet by mouth 2 times daily          Current Meds  vancomycin (VANCOCIN) 750 mg in dextrose 5 % 250 mL IVPB, Q12H  fentaNYL (SUBLIMAZE) injection 100 mcg, Once  midazolam (VERSED) injection 2 mg, Once  lidocaine PF 2 % injection 5 mL, Once  sodium chloride flush 0.9 % injection 10 mL, 2 times per day  sodium chloride flush 0.9 % injection 10 mL, PRN  acetaminophen (TYLENOL) tablet 650 mg, Q6H PRN    Or  acetaminophen (TYLENOL) suppository 650 mg, Q6H PRN  cefepime (MAXIPIME) 2 g IVPB minibag, Q8H          ASA 2 - Patient with mild systemic disease with no functional limitations    II (soft palate, uvula, fauces visible)    Activity:  2 - Able to move 4 extremities voluntarily on command  Respiration:  2 - Able to breathe deeply and cough freely  Circulation:  2 - BP+/- 20mmHg of normal  Consciousness:  2 - Fully awake  Oxygen Saturation (color):  2 - Able to maintain oxygen saturation >92% on room air    Sedation : Moderate sedation planned

## 2020-12-09 PROCEDURE — 97162 PT EVAL MOD COMPLEX 30 MIN: CPT

## 2020-12-09 PROCEDURE — 6370000000 HC RX 637 (ALT 250 FOR IP): Performed by: INTERNAL MEDICINE

## 2020-12-09 PROCEDURE — 2500000003 HC RX 250 WO HCPCS: Performed by: RADIOLOGY

## 2020-12-09 PROCEDURE — 05HY33Z INSERTION OF INFUSION DEVICE INTO UPPER VEIN, PERCUTANEOUS APPROACH: ICD-10-PCS | Performed by: INTERNAL MEDICINE

## 2020-12-09 PROCEDURE — 97535 SELF CARE MNGMENT TRAINING: CPT

## 2020-12-09 PROCEDURE — 97530 THERAPEUTIC ACTIVITIES: CPT

## 2020-12-09 PROCEDURE — 97166 OT EVAL MOD COMPLEX 45 MIN: CPT

## 2020-12-09 PROCEDURE — 6360000002 HC RX W HCPCS: Performed by: INTERNAL MEDICINE

## 2020-12-09 PROCEDURE — 2500000003 HC RX 250 WO HCPCS: Performed by: INTERNAL MEDICINE

## 2020-12-09 PROCEDURE — 99232 SBSQ HOSP IP/OBS MODERATE 35: CPT | Performed by: INTERNAL MEDICINE

## 2020-12-09 PROCEDURE — 1200000000 HC SEMI PRIVATE

## 2020-12-09 PROCEDURE — 99223 1ST HOSP IP/OBS HIGH 75: CPT | Performed by: SURGERY

## 2020-12-09 PROCEDURE — C1751 CATH, INF, PER/CENT/MIDLINE: HCPCS

## 2020-12-09 PROCEDURE — 2580000003 HC RX 258: Performed by: INTERNAL MEDICINE

## 2020-12-09 PROCEDURE — 36569 INSJ PICC 5 YR+ W/O IMAGING: CPT

## 2020-12-09 PROCEDURE — 97116 GAIT TRAINING THERAPY: CPT

## 2020-12-09 RX ORDER — SODIUM CHLORIDE 0.9 % (FLUSH) 0.9 %
10 SYRINGE (ML) INJECTION PRN
Status: DISCONTINUED | OUTPATIENT
Start: 2020-12-09 | End: 2020-12-14 | Stop reason: HOSPADM

## 2020-12-09 RX ORDER — SODIUM CHLORIDE 0.9 % (FLUSH) 0.9 %
10 SYRINGE (ML) INJECTION EVERY 12 HOURS SCHEDULED
Status: DISCONTINUED | OUTPATIENT
Start: 2020-12-09 | End: 2020-12-14 | Stop reason: HOSPADM

## 2020-12-09 RX ORDER — LIDOCAINE HYDROCHLORIDE 10 MG/ML
5 INJECTION, SOLUTION EPIDURAL; INFILTRATION; INTRACAUDAL; PERINEURAL ONCE
Status: COMPLETED | OUTPATIENT
Start: 2020-12-09 | End: 2020-12-09

## 2020-12-09 RX ADMIN — CEFEPIME HYDROCHLORIDE 2 G: 2 INJECTION, POWDER, FOR SOLUTION INTRAVENOUS at 06:10

## 2020-12-09 RX ADMIN — Medication 10 ML: at 17:15

## 2020-12-09 RX ADMIN — VANCOMYCIN HYDROCHLORIDE 750 MG: 10 INJECTION, POWDER, LYOPHILIZED, FOR SOLUTION INTRAVENOUS at 18:49

## 2020-12-09 RX ADMIN — CEFEPIME HYDROCHLORIDE 2 G: 2 INJECTION, POWDER, FOR SOLUTION INTRAVENOUS at 17:14

## 2020-12-09 RX ADMIN — VANCOMYCIN HYDROCHLORIDE 750 MG: 10 INJECTION, POWDER, LYOPHILIZED, FOR SOLUTION INTRAVENOUS at 04:49

## 2020-12-09 RX ADMIN — LIDOCAINE HYDROCHLORIDE 5 ML: 20 INJECTION, SOLUTION EPIDURAL; INFILTRATION; INTRACAUDAL; PERINEURAL at 16:49

## 2020-12-09 RX ADMIN — ACETAMINOPHEN 650 MG: 325 TABLET ORAL at 22:48

## 2020-12-09 RX ADMIN — Medication 10 ML: at 16:58

## 2020-12-09 RX ADMIN — DOCUSATE SODIUM 100 MG: 100 CAPSULE ORAL at 21:04

## 2020-12-09 RX ADMIN — ACETAMINOPHEN 650 MG: 325 TABLET ORAL at 17:15

## 2020-12-09 RX ADMIN — LIDOCAINE HYDROCHLORIDE 5 ML: 10 INJECTION, SOLUTION EPIDURAL; INFILTRATION; INTRACAUDAL; PERINEURAL at 16:23

## 2020-12-09 ASSESSMENT — PAIN DESCRIPTION - FREQUENCY
FREQUENCY: CONTINUOUS

## 2020-12-09 ASSESSMENT — PAIN DESCRIPTION - LOCATION
LOCATION: ABDOMEN;BACK;FLANK
LOCATION: ABDOMEN;BACK
LOCATION: ABDOMEN;BACK

## 2020-12-09 ASSESSMENT — PAIN SCALES - GENERAL
PAINLEVEL_OUTOF10: 0
PAINLEVEL_OUTOF10: 1
PAINLEVEL_OUTOF10: 3
PAINLEVEL_OUTOF10: 4
PAINLEVEL_OUTOF10: 3
PAINLEVEL_OUTOF10: 4
PAINLEVEL_OUTOF10: 1
PAINLEVEL_OUTOF10: 4
PAINLEVEL_OUTOF10: 0
PAINLEVEL_OUTOF10: 4
PAINLEVEL_OUTOF10: 3
PAINLEVEL_OUTOF10: 0
PAINLEVEL_OUTOF10: 0
PAINLEVEL_OUTOF10: 4

## 2020-12-09 ASSESSMENT — PAIN DESCRIPTION - PAIN TYPE
TYPE: ACUTE PAIN;SURGICAL PAIN
TYPE: ACUTE PAIN
TYPE: ACUTE PAIN;SURGICAL PAIN

## 2020-12-09 ASSESSMENT — PAIN DESCRIPTION - DESCRIPTORS
DESCRIPTORS: DISCOMFORT;SORE
DESCRIPTORS: DISCOMFORT
DESCRIPTORS: DISCOMFORT;SORE

## 2020-12-09 ASSESSMENT — PAIN DESCRIPTION - ONSET
ONSET: ON-GOING
ONSET: ON-GOING

## 2020-12-09 ASSESSMENT — PAIN - FUNCTIONAL ASSESSMENT
PAIN_FUNCTIONAL_ASSESSMENT: ACTIVITIES ARE NOT PREVENTED
PAIN_FUNCTIONAL_ASSESSMENT: ACTIVITIES ARE NOT PREVENTED

## 2020-12-09 ASSESSMENT — PAIN DESCRIPTION - ORIENTATION
ORIENTATION: LOWER
ORIENTATION: LOWER

## 2020-12-09 NOTE — PROGRESS NOTES
Physical Therapy    Facility/Department: [address]  Initial Assessment and treatment    NAME: Shannan Amaya  : 1950  MRN: [id number]    Date of Service: 2020    Discharge Recommendations:Rose Melendez scored a 18/24 on the AM-PAC short mobility form. Current research shows that an AM-PAC score of 18 or greater is typically associated with a discharge to the patient's home setting. Based on the patient's AM-PAC score and their current functional mobility deficits, it is recommended that the patient have 2-3 sessions per week of Physical Therapy at d/c to increase the patient's independence. At this time, this patient demonstrates the endurance and safety to discharge home with home services and a follow up treatment frequency of 2-3x/wk. Please see assessment section for further patient specific details. If patient discharges prior to next session this note will serve as a discharge summary. Please see below for the latest assessment towards goals. Assessment   Body structures, Functions, Activity limitations: Decreased functional mobility   Assessment: Pt is a 78 y/o female who presents with psoas abscess and fevers following lumbar surgery. She has decreased strength, balance, activity tolerance, and increased pain compared to her baseline. She has difficulty with gait, transfers, and bed mobility compared to her independent baseline. Pt very anxious with mobility and responded well to cues that therapist gave her though tended to revert back to worse gait mechanics with increased speed or changing to no AD. She would benefit from continued PT in the acute setting as well as upon discharge. Her  is able to provide 24/7 supervision upon discharge. Treatment Diagnosis: impaired mobility  Prognosis: Good  Decision Making: Medium Complexity  PT Education: Goals;PT Role;Plan of Care;Transfer Training;Gait Training;General Safety; Disease Specific Education  Patient Education: abruptly. Pt encouraged to try short distance ambulation without device and nursing staff in room and walk with walker with increased pain or longer distances. She denied an increase in pain with mobility regardless of AD or no AD. Balance  Posture: Fair  Sitting - Static: Good  Sitting - Dynamic: Good  Standing - Static: Fair  Standing - Dynamic: Fair;-        Plan   Plan  Times per week: 2-5  Times per day: Daily  Current Treatment Recommendations: Strengthening, Neuromuscular Re-education, Balance Training, Endurance Training, Functional Mobility Training, Transfer Training, Gait Training, Patient/Caregiver Education & Training, Safety Education & Training, Stair training  Safety Devices  Type of devices: Bed alarm in place, Call light within reach, Left in bed, Nurse notified      AM-PAC Score  AM-PAC Inpatient Mobility Raw Score : 18 (12/09/20 1101)  AM-PAC Inpatient T-Scale Score : 43.63 (12/09/20 1101)  Mobility Inpatient CMS 0-100% Score: 46.58 (12/09/20 1101)  Mobility Inpatient CMS G-Code Modifier : CK (12/09/20 1101)          Goals  Short term goals  Time Frame for Short term goals: By discharge  Short term goal 1: Pt will be able to perform bed mobility with supervision  Short term goal 2: Pt will transfer with supervision and no AD  Short term goal 3: Pt will ambulate 150' with walker and supervision  Short term goal 4: Pt will go up/down 1 platform step with supervision  Patient Goals   Patient goals :  To go home and be able to get back to hiking       Therapy Time   Individual Concurrent Group Co-treatment   Time In 0817         Time Out 0903         Minutes 46         Timed Code Treatment Minutes: 31 Minutes    Timed Code Treatment Minutes:  31 Minutes    Total Treatment Minutes:  46 minutes      Shakira oGmez PT

## 2020-12-09 NOTE — PROGRESS NOTES
ID Follow-up NOTE  Medical Student note - reviewed and modified, see Attending addendum at bottom    CC:   Post-operative R iliopsoas abscess  Antibiotics: Vancomycin, Cefepime    Admit Date: 2020  Hospital Day: 3    Subjective:     Patient still complains of mild right-sided pain only when ambulating. Does not have any complaints about the drain. Pt concerned about PICC line procedure     Objective:     Patient Vitals for the past 8 hrs:   BP Temp Temp src Pulse Resp SpO2   20 0447 118/62 97.7 °F (36.5 °C) Oral 88 18 97 %     I/O last 3 completed shifts: In: 1210 [P.O.:1200; I.V.:10]  Out: 910 [Urine:875; Drains:35]  No intake/output data recorded. EXAM:  GENERAL:      No apparent distress. HEENT:           Membranes moist, no oral lesion  NECK:             Supple, no lymphadenopathy  LUNGS:           Clear b/l, no rales, no dullness  CARDIAC:       RRR, no murmur appreciated  ABD:                + BS, soft. Right sided drain present - dressed with mild surrounding erythema and induration  EXT:                No rash, no edema, no lesions  NEURO:          No focal neurologic findings  PSYCH:           Orientation, sensorium, mood normal  LINES:             R arm Peripheral iv       Data Review:  Lab Results   Component Value Date    WBC 8.4 2020    HGB 10.4 (L) 2020    HCT 30.6 (L) 2020    MCV 88.5 2020     (H) 2020     Lab Results   Component Value Date    CREATININE 0.5 (L) 2020    BUN 14 2020     (L) 2020    K 3.8 2020    CL 94 (L) 2020    CO2 29 2020       Hepatic Function Panel:   Lab Results   Component Value Date    LABALBU 3.1 2020       MICRO:   Abscess GS : 4+ WBC (PMN), 2+ GNR    Imagin/7 CT abdomen/pelvis - [facility]  MUSCULOSKELETAL:  There is ectopic gas identified along the right lateral abdominal wall musculature.  There is a elliptical fluid collection in the right lateral abdominal wall with air-fluid level. The collection measures approximately 8.5 x 2.4 cm in greatest transaxial dimensions. There is a complex rim-enhancing fluid collection in the right ileal psoas muscle consistent with an abscess. The collection measures approximately 4.1 x 2.6 cm on transaxial dimensions and up to 8.3 cm in craniocaudad dimension. There are surgical changes of the lumbosacral spine with posterior surgical fusion at L4-5 with intervertebral disc prosthesis. Ectopic gas pockets identified at the disc space at L4-5 that may be postsurgical in nature. There is high dense material extending from the right iliopsoas region to the posterior lateral aspect of the disc space that may be related to extruded material from the surgical prosthesis. Ectopic gas also identified extending into the epicardial region of the   anterior lower chest and right posterior retroperitoneum. 1. Large complex right iliopsoas abscess as described above. 2. Fluid collection with air-fluid level associated with large amount of ectopic gas along the right lateral abdominal wall.     Scheduled Meds:   vancomycin  750 mg Intravenous Q12H    fentanNYL  100 mcg Intravenous Once    midazolam  2 mg Intravenous Once    lidocaine PF  5 mL Intradermal Once    docusate sodium  100 mg Oral BID    sodium chloride flush  10 mL Intravenous 2 times per day    cefepime  2 g Intravenous Q8H       Continuous Infusions:      PRN Meds:  sodium chloride flush, acetaminophen **OR** acetaminophen      Assessment:     80 yo female patient with PMHx of arthritis, spinal stenosis with neurogenic claudication, and lumbar spondylolisthesis s/p right L4-5 lateral lumbar interbody fusion with pedicle screw fixation on 11/24 at [facility].     Right iliopsoas abscess s/p CT drainage  - Recent surgery with incision on right side  - Hx of post-operative fevers and abdominal pain/tenderness  - CT A/P showing complex right iliopsoas abscess  - R sided drain present - purulent fluid  - Fever yesterday up to 102.7 - now afebrile at 98.6    Plan:     Cont vancomycin and cefepime  F/u abscess cultures  Monitor VS, CBC  Drain management per neurosurgery  PICC today    Discussed with pt, PICC nurse  Hamilton Rivera MS IV    Addendum to Medical Student Progress note:  Pt seen,examined and evaluated. I have independently performed history, physical exam, lab and data review. I have determined assessment and plan as documented by student Oj Drake).     78 yo woman with hx spinal stenosis, OA  Hx R l4/5 lumbar fusion 11/24 ([facility] Steve, Dr Danelle Larson).       Presents with [facility] with 1 week fever / R side pain. CT with 'large R complex ileopsoas abscess'. At [facility] CoV-2 PCR neg. Transferred to [facility] and admit 12/7 -- T 99.6, WBC 8.4  12/8 - IR aspiration pus, placed drain.   GS 2+ GNR     IMP/  Lumbar SSI - s/p aspiration / drain       REC/  Cont vancomycin + cefepime for now  Will f/u on abscess cult - GS with GNR  Drain per Neurosurg / IR  Will review CT (will see if available on PACS from [place])     Anticipate long course of antibiotics (SSI with hardware)     Discussed with pt, PICC RN  Cedric Luna MD

## 2020-12-09 NOTE — PROGRESS NOTES
Patient received to room 3307 from . Patient A&Ox4 upon arrival. VSS. Patient oriented to room, staff, and call system. Educated on fall protocol and hourly rounding. Assessment as documented. IVF infusing. Bed locked in low position. Patient informed to utilize call light with any needs. Pt verbalized understanding. Call light within reach. Will continue to monitor.

## 2020-12-09 NOTE — CARE COORDINATION
Saw that PICC is being placed, however no note to choice of antibiotic in chart. Perfectserved Dr. Cathleen Newman to find out the antibiotic of choice for home so this CM can contact the [employer] company. Awaiting response. CM will continue to follow patient until discharge. Hubre Li RN, patient's nurse and Dr. Cathleen Newman did not speak with her re: antibiotic. Electronically signed by Armando Boles RN on 12/9/2020 at 5:18 PM     Addendum: Still awaiting response from Dr. Cathleen Newman about choice for antibiotic so I can tell [employer]. CM will continue to follow patient until discharge.   Electronically signed by Armando Boles RN on 12/9/2020 at 6:02 PM

## 2020-12-09 NOTE — CONSULTS
General Surgery   Consult Note    Reason for Consult: concern for bowel perforation    History of Present Illness:   Brandi Gregory is a 79 y.o. female with a recent surgical hx of L4-5 interbody fusion with pedicle screws with [facility] was transferred to [facility] from [facility] after CT of abd/pelvis revealed a large complex right iliopsoas abscess. Pt states she had her fusion on 11/24 for which she was admitted for 3 days after as she had post operative constipation. At home she states she continued to have abd bloating and have loose BMs everyday. 4-5 days prior to presentation to [facility] she states she started to have right sided abd pain. This pain was amplified when she would walk. She also spiked fevers as high as 103 which correlated with swelling in her lower extremities. She states the swelling has gone away but she continues to have fevers. She denies nausea, SOB, CP, sick contacts, or recent travel. She denies past abd surgeries. She states she has had some difficulty urinating. Past Medical History:        Diagnosis Date    Arthritis     Dental crowns present     Walks frequently     daily       Past Surgical History:        Procedure Laterality Date    COLONOSCOPY      CYST REMOVAL Right     arm    CYST REMOVAL Left     abod left eye    KNEE ARTHROSCOPY Left 11/3/15    with partial lateral meniscectomy    LUMBAR FUSION Right 11/24/2020    RIGHT L4-5 LATERAL LUMBAR INTERBODY FUSION WITH PEDICLE SCREW FIXATION performed by Stacie Petit MD at [facility]:  Patient has no known allergies. Medications:   Home Meds  No current facility-administered medications on file prior to encounter.       Current Outpatient Medications on File Prior to Encounter   Medication Sig Dispense Refill    sennosides-docusate sodium (SENOKOT-S) 8.6-50 MG tablet Take 2 tablets by mouth 2 times daily      polyethylene glycol (GLYCOLAX) 17 g packet Take 17 g by mouth daily 527 g 1    Multiple Vitamin (MULTIVITAMIN PO) Take by mouth      Calcium Carbonate-Vitamin D 300-100 MG-UNIT CAPS Take 1 tablet by mouth 2 times daily          Current Meds  vancomycin (VANCOCIN) 750 mg in dextrose 5 % 250 mL IVPB, Q12H  fentaNYL (SUBLIMAZE) injection 100 mcg, Once  midazolam (VERSED) injection 2 mg, Once  lidocaine PF 2 % injection 5 mL, Once  docusate sodium (COLACE) capsule 100 mg, BID  sodium chloride flush 0.9 % injection 10 mL, 2 times per day  sodium chloride flush 0.9 % injection 10 mL, PRN  acetaminophen (TYLENOL) tablet 650 mg, Q6H PRN    Or  acetaminophen (TYLENOL) suppository 650 mg, Q6H PRN  cefepime (MAXIPIME) 2 g IVPB minibag, Q8H        Family History:   Family History   Problem Relation Age of Onset    Anesth Problems Other     Arthritis Other     Heart Disease Other     High Blood Pressure Other     Stroke Other        Social History:   TOBACCO:   reports that she has never smoked. She has never used smokeless tobacco.  ETOH:   reports no history of alcohol use. DRUGS:   reports no history of drug use. Review of Systems:     Constitutional: Negative. HENT: Negative. Eyes: Negative. Respiratory: Negative. Cardiovascular: Negative. Gastrointestinal: See HPI  Genitourinary: Negative. Musculoskeletal: Negative. Skin: Negative. Endocrine: Negative. Allergic/Immunologic: Negative. Neurological: Negative. Hematological: Negative. Psychiatric/Behavioral: Negative.     Physical exam:    Vitals:    12/08/20 1907 12/08/20 1941 12/08/20 2222 12/09/20 0447   BP:  124/62 105/62 118/62   Pulse:  90 88 88   Resp:  17 18 18   Temp: 100.5 °F (38.1 °C) 99.1 °F (37.3 °C) 98.5 °F (36.9 °C) 97.7 °F (36.5 °C)   TempSrc: Oral Oral Oral Oral   SpO2:  95% 97% 97%   Weight:       Height:           General appearance: alert, no acute distress, grooming appropriate  Eyes: no scleral icterus  Neck: trachea midline, no JVD, no lymphadenopathy  Chest/Lungs: CTAB, no crackles/rales, wheezes/rhonchi, normal effort  Cardiovascular: RRR, no murmurs/gallops/rubs  Abdomen: soft, tender RLQ to Right flantk, non-distended, +BS, no guarding/rigidity, incision sites to right flank  Back: x2 surgical incision  Skin: warm and dry, no rashes  Extremities: no edema, no cyanosis  Neuro: A&Ox3, no focal deficits, sensation intact    Labs:    CBC:   Recent Labs     12/08/20  0600   WBC 8.4   HGB 10.4*   HCT 30.6*   MCV 88.5   *     BMP:   Recent Labs     12/07/20  2255 12/08/20  0600   * 132*   K 4.1 3.8   CL 92* 94*   CO2 28 29   PHOS 3.1 3.1   BUN 13 14   CREATININE <0.5* 0.5*     PT/INR:   Recent Labs     12/08/20  0600   PROTIME 13.0   INR 1.12     APTT:   Recent Labs     12/08/20  0600   APTT 29.1     Liver Profile: No results found for: AST, ALT, ALB, BILIDIR, BILITOT, ALKPHOS, GGT, 5NUCNo results found for: CHOL, HDL, TRIG  UA: No results found for: NITRITE, COLORU, PHUR, LABCAST, WBCUA, RBCUA, MUCUS, TRICHOMONAS, YEAST, BACTERIA, CLARITYU, SPECGRAV, LEUKOCYTESUR, UROBILINOGEN, BILIRUBINUR, BLOODU, GLUCOSEU, AMORPHOUS    Imaging:   CT DRAINAGE HEMATOMA/SEROMA/FLUID COLLECTION   Final Result   Impression: Technically successful CT-guided percutaneous drain placement into a postsurgical right psoas abscess. Assessment/Plan: This is a 79 y.o. female with Hx of L4-5 fusion on 11/24/20 presented with 1 week hx of fevers, abd pain, swelling. CT with right psoas abscess for which IR placed perc drain on 12/8 and aspirated 15cc hemo purulent fluid. Duplex of LE's was negative for DVT at [facility].  Having abd bloating and loose BM's since surgery; difficulty urinating.     -Continue serial abd exams; if abd worsens will consider abd CT with oral contrast. Low suspicion for bowel perforation.   -Ok to have diet  -Continue abx per ID and perc drainage  -General surgery will continue to follow    Campos Currie CNP  12/09/20  [phone]    I have seen, examined, and reviewed the patients

## 2020-12-09 NOTE — PROGRESS NOTES
Report called to Jude Uribe. Pt transported via bed with transport and belongings including pink bag, cell phone, and own walker. All questions answered.

## 2020-12-09 NOTE — PROGRESS NOTES
4 Eyes Admission Assessment     I agree as the admission nurse that 2 RN's have performed a thorough Head to Toe Skin Assessment on the patient. ALL assessment sites listed below have been assessed on admission. Areas assessed by both nurses:   [x]   Head, Face, and Ears   [x]   Shoulders, Back, and Chest  [x]   Arms, Elbows, and Hands   [x]   Coccyx, Sacrum, and Ischium  [x]   Legs, Feet, and Heels        Does the Patient have Skin Breakdown? Yes, surgical incision on lower back and right lower back drain.            Tee Prevention initiated:  NA   Wound Care Orders initiated:  NA      WOC nurse consulted for Pressure Injury (Stage 3,4, Unstageable, DTI, NWPT, and Complex wounds) or Tee score 18 or lower:  NA      Nurse 1 eSignature: Electronically signed by Anjali Cano RN on 12/8/20 at 11:18 PM EST    **SHARE this note so that the co-signing nurse is able to place an eSignature**    Nurse 2 eSignature: Electronically signed by Sguar Tomas RN on 12/8/20 at 11:41 PM EST

## 2020-12-09 NOTE — PROGRESS NOTES
Internal Medicine Progress Note        PCP: Christophe Eaton MD    Date of Admission: 12/7/2020    Chief Complaint: Abdominal pain, fevers, leg swelling     Hospital Course: 79year old female with PMHx of arthritis, spinal stenosis with neurogenic claudication, and lumbar spondylolisthesis who underwent right L4-5 lateral lumbar [facility] Presented to [facility] ED c/o 7 day h/o fevers (Tmax 103), abdominal pain/swelling and bilateral leg swelling, that have been getting progressively worse. Patient states  has been taking care of surgical wounds daily. Denied any skin changes or discoloration. Endorses difficulty with urination, with decreased frequency as well as decreased output when she does go.      In [facility] ED, temperature was 99.6, VSS. Labs significant for Na of 130, , WBC 11.1. Lactic acid wnl. CT abd/pelvis at [facility] ED revealed large complex right iliopsoas abscess and decision was made to transfer patient to [facility] for further w/u. Subjective:     No acute events overnight. Patient seen this morning at bedside. Afebrile, VSS. UOP adequate. Tolerating diet. Fever yesterday of 102.7 recorded @ 18:05, resolved with Tylenol. Continues to c/o right sided flank pain but does not wish to take medication at this time. Will continue with ice pack and rest. UOP adequate. Loose BM this morning. General surgery consulted to r/o bowel perforation in setting of GNR found in abscess culture.        Medications:  Reviewed    Infusion Medications   Scheduled Medications    vancomycin  750 mg Intravenous Q12H    fentanNYL  100 mcg Intravenous Once    midazolam  2 mg Intravenous Once    lidocaine PF  5 mL Intradermal Once    docusate sodium  100 mg Oral BID    sodium chloride flush  10 mL Intravenous 2 times per day    cefepime  2 g Intravenous Q8H     PRN Meds: sodium chloride flush, acetaminophen **OR** acetaminophen      Intake/Output Summary (Last 24 hours) at 12/9/2020 1030  Last data filed at 12/8/2020 2138  Gross per 24 hour   Intake 1210 ml   Output 835 ml   Net 375 ml       Physical Exam Performed:    /75   Pulse 93   Temp 98.3 °F (36.8 °C) (Oral)   Resp 16   Ht 5' 5\" (1.651 m)   Wt 118 lb 9.7 oz (53.8 kg)   SpO2 99%   BMI 19.74 kg/m²     General appearance: No apparent distress, appears stated age and cooperative. HEENT: Pupils equal, round, and reactive to light. Conjunctivae/corneas clear. Neck: Supple, with full range of motion. No jugular venous distention. Trachea midline. Respiratory:  Normal respiratory effort. Clear to auscultation, bilaterally without Rales/Wheezes/Rhonchi. Cardiovascular: Regular rate and rhythm with normal S1/S2 without murmurs, rubs or gallops. Abdomen: Soft, non-distended with normal bowel sounds. Tender to palpation in right flank. Incision sites to right flank. Right sided perc drain present - C/D/I with mild surrounding erythema and tenderness. Musculoskeletal: No clubbing, cyanosis or edema bilaterally. Full range of motion without deformity. Skin: Skin color, texture, turgor normal.  No rashes or lesions. Surgical sites from lumbar fusion C/D/I with no erythema or swelling noted. Neurologic:  Neurovascularly intact without any focal sensory/motor deficits. Cranial nerves: II-XII intact, grossly non-focal.  Psychiatric: Alert and oriented, thought content appropriate, normal insight  Capillary Refill: Brisk,< 3 seconds   Peripheral Pulses: +2 palpable, equal bilaterally     Labs:   Recent Labs     12/08/20  0600   WBC 8.4   HGB 10.4*   HCT 30.6*   *     Recent Labs     12/07/20  2255 12/08/20  0600   * 132*   K 4.1 3.8   CL 92* 94*   CO2 28 29   BUN 13 14   CREATININE <0.5* 0.5*   CALCIUM 9.9 9.5   PHOS 3.1 3.1     No results for input(s): AST, ALT, BILIDIR, BILITOT, ALKPHOS in the last 72 hours.   Recent Labs     12/08/20  0600   INR 1.12 No results for input(s): Lorene Chris in the last 72 hours. Urinalysis:    No results found for: Elgie Reels, BACTERIA, RBCUA, BLOODU, SPECGRAV, [address]    Radiology:  CT DRAINAGE HEMATOMA/SEROMA/FLUID COLLECTION   Final Result   Impression: Technically successful CT-guided percutaneous drain placement into a postsurgical right psoas abscess. Imagin/7: CT abdomen/pelvis [facility]  There is ectopic gas identified along the right lateral abdominal wall musculature. There is a elliptical fluid collection in the right lateral abdominal wall with air-fluid level. The collection measures approximately 8.5 x 2.4 cm in greatest transaxial dimensions. There is a complex rim-enhancing fluid collection in the right ileal psoas muscle consistent with an abscess. The collection measures approximately 4.1 x 2.6 cm on transaxial dimensions and up to 8.3 cm in craniocaudad dimension. There are surgical changes of the lumbosacral spine with posterior surgical fusion at L4-5 with intervertebral disc prosthesis. Ectopic gas pockets identified at the disc space at L4-5 that may be postsurgical in nature. There is high dense material extending from the right iliopsoas region to the posterior lateral aspect of the disc space that may be related to extruded material from the surgical prosthesis. Ectopic gas also identified extending into the epicardial region of the anterior lower chest and right posterior retroperitoneum. 1. Large complex right iliopsoas abscess as described above. 2. Fluid collection with air-fluid level associated with large amount of ectopic gas along the right lateral abdominal wall. Assessment/Plan:    Theodor Stable, 79 y.o. female w/ PMHx of arthritis, spinal stenosis with neurogenic claudication, and lumbar spondylolisthesis who underwent a right L4-5 lateral lumbar fusion with pedicle screw fixation on .  Admitted for a large complex right iliopsoas abscess. Active Hospital Problems    Diagnosis Date Noted    Deep incisional surgical site infection [T81.42XA] 12/08/2020    Psoas abscess ([address] [K68.12] 12/07/2020    S/P lumbar fusion [Z98.1] 11/24/2020     Right iliopsoas abscess s/p CT drainage   Recent surgery with incision on right side. 1 week hx of spiking fevers (Tmax 103), abdominal pain and swelling, fatigue, malaise, pain with ambulation. CT abd/pelvis showed complex large iliopsoas abscess. - Neurosurgery consulted, appreciate recs   - Continue cefepime 2g IV q8h and vancomycin 750 mg IV q12h  - Abscess drained 12/8 - 15 cc blood tinged purulent fluid drained   - Surgical culture: 4+ WBC's, 2+ gram negative rods   - F/u culture sensitivities   - ID consulted for abx management   - PICC needed for long-term course of abx   - SW on board   - Pain control as needed  - PTOT to see   - NPO until general surgery evaluates patient  - Strict I/O's, monitor UOP  - Consult to surgery to r/o perforation given GNR in abscess cx     s/p right L4-L5 lateral lumbar fusion with pedicle screw fixation   - Neurosurgery consulted    DVT Prophylaxis: SCD's  Diet: Diet NPO Effective Now  Code Status: Full Code    PT/OT Eval Status: Consulted  Dispo - Inpt, when clinically improved.      I will discuss the patient with the senior resident and MD Radha Lowry, MS4 as scribe for Dr. Janet Santos MD

## 2020-12-09 NOTE — PROCEDURES
Order noted for PICC, waiting on Dr Doug Abarca recommendations regarding final antibiotic order for right line for pt and final insertion comfirmation. Pricing noted for antibiotics and would be good to wait on final decision with MD.  Dr Aleshia Royal noted to be in clinic and will be in this afternoon. Will follow.

## 2020-12-09 NOTE — PROGRESS NOTES
Occupational Therapy   Occupational Therapy Initial Assessment and Treatment  Late Entry for 1140  Date: 2020   Patient Name: Herminia Toscano  MRN: [id number]     : 1950    Date of Service: 2020    Discharge Recommendations:  Herminia Toscano scored a 18/24 on the AM-PAC ADL Inpatient form. Current research shows that an AM-PAC score of 18 or greater is typically associated with a discharge to the patient's home setting. Based on the patient's AM-PAC score, and their current ADL deficits, it is recommended that the patient have 2-3 sessions per week of Occupational Therapy at d/c to increase the patient's independence. At this time, this patient demonstrates the endurance and safety to discharge home with home services and a follow up treatment frequency of 2-3x/wk. Please see assessment section for further patient specific details. If patient discharges prior to next session this note will serve as a discharge summary. Please see below for the latest assessment towards goals. HOME HEALTH CARE: LEVEL 1 STANDARD    - Initial home health evaluation to occur within 24-48 hours, in patient home   - Therapy to evaluate with goal of regaining prior level of functioning   - Therapy to evaluate if patient has [id number] needs for personal care         OT Equipment Recommendations  Equipment Needed: No(has needed equipment in place at home)    Assessment   Performance deficits / Impairments: Decreased functional mobility ; Decreased ADL status  Assessment: Pt functioning at SB/CGA level for functional transfers and mobility. C/o pain RLE w/ WB - improved to SBA using wh walker. Plans are to return home w/ assist of spouse (available 24 hr). No DME needs. Continue to follow during admission.   Treatment Diagnosis: impaired ADLs/transfers 2* abd/R hip region pain  Prognosis: Good  Decision Making: Low Complexity  OT Education: OT Role;Plan of Care;ADL Adaptive Strategies;Transfer Training  Patient unit  Bathroom Toilet: Handicap height(has armrests)  Bathroom Equipment: Shower chair, Grab bars in shower(grab bars throughout house)  Home Equipment: Rolling walker, Reacher, Grab bars  Receives Help From: Family()  ADL Assistance: Needs assistance( has been assisting w/ LB ADLs since surgery)  Homemaking Assistance: Independent  Homemaking Responsibilities: Yes  Ambulation Assistance: Independent(using wh walker since back surgery; does not typically use AD)  Transfer Assistance: Independent  Active : Yes  Occupation: Retired  Type of occupation: [place] [employer]: hiking, be active  Additional Comments: denies falls, very active typically,  home all the time       Objective   Vision: Impaired  Vision Exceptions: Wears glasses at all times  Hearing: Within functional limits      Orientation  Overall Orientation Status: Within Functional Limits     Observation/Palpation  Posture: Good  Observation: guarded w/ all mobility, anxious; decreased WB through RLE during mobility     Balance  Sitting Balance: Supervision  Standing Balance: Stand by assistance    Functional Mobility  Functional Mobility Comments: to bathroom w/o AD (CGA) - high guard and decreased WB RLE; improved to SBA using wh walker    Toilet Transfers  Toilet - Technique: Ambulating  Equipment Used: Standard toilet(w/ bar)  Toilet Transfer: Stand by assistance    ADL  Grooming: Independent(washing hands at sink level, standing (SBA for standing))  Toileting: Stand by assistance(wiping and managing clothing; keeping R knee flexed and WB on R toes)  Additional Comments: Note: small BM first trip to bathroom (formed); after returning to bedside, requesting to go back to bathroom     Coordination  Movements Are Fluid And Coordinated: Yes        Bed mobility  Supine to Sit: Stand by assistance(HOB elevated, bedrail, increased time/effort; cues for log roll technique)  Sit to Supine:  Moderate assistance(for BLE and HOB elevated; cues for log roll technique)     Transfers  Sit to stand: Contact guard assistance  Stand to sit: Contact guard assistance        Cognition  Cognition Comment: pt anxious about mobility and pain and holding her body stiff with mobility; pleasant, cooperative, follows directions           Sensation  Overall Sensation Status: WFL(denies numbness/tingling)                       Pt seen by OT for eval and treat.  Treatment included: bed mobility, functional transfer/mobility, ADL, pt education            Plan   Plan  Times per week: 2-5  Times per day: Daily  Current Treatment Recommendations: Functional Mobility Training, Endurance Training, Safety Education & Training, Self-Care / ADL, Patient/Caregiver Education & Training                                                    AM-PAC Score        AM-PAC Inpatient Daily Activity Raw Score: 18 (12/09/20 1150)  AM-PAC Inpatient ADL T-Scale Score : 38.66 (12/09/20 1150)  ADL Inpatient CMS 0-100% Score: 46.65 (12/09/20 1150)  ADL Inpatient CMS G-Code Modifier : CK (12/09/20 1150)    Goals  Short term goals  Time Frame for Short term goals: Discharge  Short term goal 1: toilet transfer w/ Supervision  Short term goal 2: toileting hygiene w/ Supervision  Short term goal 3: log roll w/ supervision  Short term goal 4: LB dressing w/ or w/o AE, Supervision  Patient Goals   Patient goals : to have decreased pain level       Therapy Time   Individual Concurrent Group Co-treatment   Time In 1045         Time Out 1140         Minutes 55           Timed Code Treatment Minutes:  40  Total Treatment Minutes:  [facility] OTR/L #[id number]

## 2020-12-09 NOTE — PROGRESS NOTES
Patient admits to pain rated # 4/10, however patient refuses medication at this time. Ice pack to back and side incision.

## 2020-12-09 NOTE — PROGRESS NOTES
Clinical Pharmacy Progress Note    Admit date: 12/7/2020     Subjective/Objective:  Pt is a 76yof with PMHx that includes arthritis who is s/p Right L4-L5 lumbar fusion surgery on 11/24/20. Pt now presents with fevers and leg / abdominal swelling - found on imaging to have large complex right iliopsoas abscess. Interval update:  IR placed drain in abscess yesterday - fluid culture with 2+ Gm negative rods thus far. Tmax 102.7 last 24 hrs. Pharmacy is consulted to dose Vancomycin per Dr. Josephine Royal    Current antibiotics:  Cefepime 2g IV q8h - day #2  Vancomycin - Pharmacy to dose -day #2   750mg IV q12h (12/8 - current)      Recent Labs     12/07/20  2255 12/08/20  0600   * 132*   K 4.1 3.8   CL 92* 94*   CO2 28 29   BUN 13 14   CREATININE <0.5* 0.5*   GLUCOSE 133* 140*       Estimated Creatinine Clearance: 89 mL/min (A) (based on SCr of 0.5 mg/dL (L)). Lab Results   Component Value Date    WBC 8.4 12/08/2020    HGB 10.4 (L) 12/08/2020    HCT 30.6 (L) 12/08/2020    MCV 88.5 12/08/2020     (H) 12/08/2020       Lab Results   Component Value Date    PROTIME 13.0 12/08/2020    INR 1.12 12/08/2020       Height:  5' 5\" (165.1 cm)  Weight:  118 lb 9.7 oz (53.8 kg)    Vancomycin Levels:  Trough  12/10 @ 05:00 = ordered    Culture results:  Abscess fluid (12/8) = 2+ GNR, final ID pending    Prophylaxis:  VTE:  SCD's  GI:  Not indicated      Assessment/Plan:  1)  Large complex right iliopsoas abscess s/p L4-L5 lumbar fusion 2 weeks ago:  Cefepime + Vancomycin - day #2  · Cefepime -   Current dose remains appropriate based on indication and current renal function. Will continue to monitor and adjust as needed per [facility] Renal Dose Adjustment Policy. · Vancomycin - Pharmacy to dose  · Continue 750mg IV q12h. · Trough is ordered with dose due 12/10 05:00.   · Clinical condition will be monitored closely, and levels will be ordered as clinically indicated.     2)  Glucose:  · Glucose on AM labs yesterday = 140 - unsure if fasting? BMP not done today. If fasting glucoses remain > 125, may consider checking a HgA1c to evaluate for possible undiagnosed diabetes or pre-diabetes.     Please call with questions--  Thanks--  Moy Samuels, PharmD, [address], [facility] [place]  [phone] or [id number] ([facility])   12/9/2020 10:05 AM

## 2020-12-09 NOTE — PLAN OF CARE
Problem: Pain:  Goal: Pain level will decrease  Description: Pain level will decrease  Outcome: Ongoing     Problem: Pain:  Goal: Control of acute pain  Description: Control of acute pain  Outcome: Ongoing     Problem: Pain:  Goal: Control of chronic pain  Description: Control of chronic pain  Outcome: Ongoing     Problem: Falls - Risk of:  Goal: Will remain free from falls  Description: Will remain free from falls  Outcome: Ongoing     Problem: Falls - Risk of:  Goal: Absence of physical injury  Description: Absence of physical injury  Outcome: Ongoing     Problem: Infection - Surgical Site:  Goal: Will show no infection signs and symptoms  Description: Will show no infection signs and symptoms  Outcome: Ongoing

## 2020-12-09 NOTE — PROCEDURES
Attempted TINA basilic and brachial veins for PICC, unable to thread wire. Then attempted NATE with new set up, PICC inserted without problems. SINGLE PICC PROCEDURE NOTE  Chart reviewed for allergies, diagnosis, labs, known contraindications, reason for line placement and planned length of treatment. Informed consent noted to be signed and on chart. Insertion procedure discussed with patient/family member. Three patient identifiers - Patient name,   and MRN -  completed &  confirmed verbally. Time out performed Hat, mask and eye shield donned. PICC site scrubbed with Chloraprep  One-Step applicator for 30 seconds x 1. Hand Hygiene  performed with 3% Chlorhexidine surgical scrub x1 min prior to  Sterile gloves, sterile gown being donned. Patient draped using maximal sterile barrier technique ( head to toe ). PICC site scrubbed a 2nd time with Chloraprep One-Step applicator x 30 sec. Modified Seldinger  technique/ultrasound assisted and tip locating system utilized for insertion. 1% Lidocaine 5 ml injected intradermal pre-insertion. PICC tip location in the SVC confirmed by ECG technology Sherlock 3CG. Positive brisk blood return obtained from all lumens  and each lumen flushed with 10 mls  0.9% Sterile Sodium Chloride. All lumens flush easily with no resistance. Valve placed on all lumens followed by Alcohol Swab Caps on end of each. Bio-patch in place. Catheter secured with non-sutured locking device per hospital protocol. Sterile Tegaderm applied over PICC site. Sterile field maintained during procedure. Guide wire and positioning wire accounted for post procedure and disposed of in sharps. Appearance of site is Clean dry and intact without bleeding or edema. All edges of Tegaderm occlusive. Site marked with date and initials of RN placing line. Teaching performed to pt/family and noted in education section.    Bed placed in low position post-procedure. Top 2 side rails in up position call button in reach. Pt. Response to procedure tolerated well. RN notified of all of the above. A Single Lumen Power PICC was trimmed at  44 CM and placed  NATE per basilic vein, 2 cm showing from insertion site.

## 2020-12-09 NOTE — PROGRESS NOTES
Physician Progress Note      Canelo Gibbs  CSN #:                  [id number]  :                       1950  ADMIT DATE:       2020 8:30 PM  [address] [place] DATE:  RESPONDING  PROVIDER #:        Dana Mckenzie MD          QUERY TEXT:    Pt admitted with psoas abscess. Pt noted to have possible sepsis. If possible,   please document in the progress notes and discharge summary if you are   evaluating and /or treating any of the following: The medical record reflects the following:  Risk Factors: 80 yo S/P combined fusion , fevers,  Clinical Indicators: Per H&P: Psoas abscess s/p L4-L5 lumbar fusion  Recent surgery with incision on right side. 1 week hx of spiking fevers (Tmax   103).  temp 102.7, HR 90 - 115, lactic acid 1.9  Treatment: ID consult, CT guided drain placement, IV Vancomycin  Options provided:  -- Sepsis 2/2 psoas abscess, present on admission  -- No Sepsis, psoas abscess only  -- Other - I will add my own diagnosis  -- Disagree - Not applicable / Not valid  -- Disagree - Clinically unable to determine / Unknown  -- Refer to Clinical Documentation Reviewer    PROVIDER RESPONSE TEXT:    This patient has sepsis 2/2 psoas abscess which was present on admission.     Query created by: Gertrude Verma on 2020 9:53 AM      Electronically signed by:  Dana Mckenzie MD 2020 10:53 AM

## 2020-12-09 NOTE — PROGRESS NOTES
NEUROSURGERY PROGRESS NOTE    [facility]  [id number]   1950 12/9/2020    Subjective: Patient sitting up in bed upon entering the room. No acute events overnight. Patient continues to c/o right side flank pain. General surgery consulted to r/o bowel perforation in setting of GNR found in iliopsoas abscess. Objective:  /62   Pulse 88   Temp 97.7 °F (36.5 °C) (Oral)   Resp 18   Ht 5' 5\" (1.651 m)   Wt 118 lb 9.7 oz (53.8 kg)   SpO2 97%   BMI 19.74 kg/m²     Physical Exam:   Patient seen and examined  GCS:  4 - Opens eyes on own  5 - Alert and oriented  6 - Follows simple motor commands  General: Well developed. Alert and cooperative in no acute distress. HENT: atraumatic, neck supple  Eyes: Optic discs: Not tested  Pulmonary: unlabored respiratory effort  Cardiovascular:  Warm well perfused. No peripheral edema  Gastrointestinal: abdomen soft, NT, ND    Neurological:  Mental Status: Awake, alert, oriented x 4, speech clear and appropriate  Attention: Intact  Language: No aphasia or dysarthria noted  Sensation: Intact to all extremities to light touch  Coordination: Intact    Musculoskeletal:   Gait: Not tested   Assist devices: None   Tone: Normal  Motor strength:    Right  Left    Right  Left    Deltoid  5 5  Hip Flex  5 5   Biceps  5 5  Knee Extensors  5 5   Triceps  5 5  Knee Flexors  5 5   Wrist Ext  5 5  Ankle Dorsiflex. 5 5   Wrist Flex  5 5  Ankle Plantarflex. 5 5   Handgrip  5 5  Ext Narendra Longus  5 5   Thumb Ext  5 5         Incisions: CDI    Radiological Findings:  CT Abd/Pelvis w contrast  OSH  Result Date: 12/7/2020  1. Large complex right iliopsoas abscess as described above. 2. Fluid collection with air-fluid level associated with large amount of ectopic gas along the right lateral abdominal wall.     Labs:  Recent Labs     12/08/20  0600   WBC 8.4   HGB 10.4*   HCT 30.6*   *       Recent Labs     12/08/20  0600   *   K 3.8   CL 94*   CO2 29   BUN 14   CREATININE 0.5*   GLUCOSE 140*   CALCIUM 9.5   PHOS 3.1       Recent Labs     12/08/20  0600   PROTIME 13.0   INR 1.12   APTT 29.1       Patient Active Problem List    Diagnosis Date Noted    Deep incisional surgical site infection 12/08/2020    Psoas abscess ([facility] [address] 12/07/2020    Spondylolisthesis of lumbar region 11/24/2020    S/P lumbar fusion 11/24/2020    Knee pain, left 09/30/2015    Lateral meniscus tear 09/30/2015       Assessment:  Patient is a 79 y.o. female s/p RIGHT L4-5 LATERAL LUMBAR INTERBODY FUSION WITH PEDICLE SCREW FIXATION by Dr. Lawrence Mina on 11/24/2020 now w/ large complex right iliopsoas abscess. Plan:  1. No emergent neurosurgical intervention indicated  2. Neurologic exams frequency: Q4H  3. For change in exam MUST contact neurosurgery team along with critical care or primary team  4. Drained abscess grew GNR  5. ID following for abx management  6. General Surgery consulted, appreciate recs  7. Pain control: Managed by medical team  8. PT/OT consulted, appreciate recs  9. NPO until General Surgery evaluates patient  10. Will follow inpatient. Please call with any questions or decline in neurological status    DISPO: Remain inpatient from neurosurgery standpoint. Dispo timing to be determined by primary team once patient is medically stable for discharge. Patient was seen and examined with Dr. Lawrence Mina who agrees with above assessment and plan.      Electronically signed by: CHADWICK Pardo, 12/9/2020 8:36 AM  [phone]

## 2020-12-10 ENCOUNTER — APPOINTMENT (OUTPATIENT)
Dept: CT IMAGING | Age: 70
DRG: 862 | End: 2020-12-10
Attending: INTERNAL MEDICINE
Payer: MEDICARE

## 2020-12-10 LAB
ALBUMIN SERPL-MCNC: 2.9 G/DL (ref 3.4–5)
ANION GAP SERPL CALCULATED.3IONS-SCNC: 6 MMOL/L (ref 3–16)
ANION GAP SERPL CALCULATED.3IONS-SCNC: 7 MMOL/L (ref 3–16)
BASOPHILS ABSOLUTE: 0 K/UL (ref 0–0.2)
BASOPHILS RELATIVE PERCENT: 0.3 %
BUN BLDV-MCNC: 9 MG/DL (ref 7–20)
BUN BLDV-MCNC: 9 MG/DL (ref 7–20)
CALCIUM SERPL-MCNC: 9.3 MG/DL (ref 8.3–10.6)
CALCIUM SERPL-MCNC: 9.8 MG/DL (ref 8.3–10.6)
CHLORIDE BLD-SCNC: 98 MMOL/L (ref 99–110)
CHLORIDE BLD-SCNC: 98 MMOL/L (ref 99–110)
CO2: 29 MMOL/L (ref 21–32)
CO2: 30 MMOL/L (ref 21–32)
CREAT SERPL-MCNC: <0.5 MG/DL (ref 0.6–1.2)
CREAT SERPL-MCNC: <0.5 MG/DL (ref 0.6–1.2)
EOSINOPHILS ABSOLUTE: 0 K/UL (ref 0–0.6)
EOSINOPHILS RELATIVE PERCENT: 0.5 %
GFR AFRICAN AMERICAN: >60
GFR AFRICAN AMERICAN: >60
GFR NON-AFRICAN AMERICAN: >60
GFR NON-AFRICAN AMERICAN: >60
GLUCOSE BLD-MCNC: 102 MG/DL (ref 70–99)
GLUCOSE BLD-MCNC: 132 MG/DL (ref 70–99)
HCT VFR BLD CALC: 28.6 % (ref 36–48)
HEMOGLOBIN: 9.7 G/DL (ref 12–16)
INR BLD: 1.17 (ref 0.86–1.14)
LYMPHOCYTES ABSOLUTE: 1 K/UL (ref 1–5.1)
LYMPHOCYTES RELATIVE PERCENT: 10.5 %
MAGNESIUM: 2.1 MG/DL (ref 1.8–2.4)
MCH RBC QN AUTO: 30.7 PG (ref 26–34)
MCHC RBC AUTO-ENTMCNC: 33.9 G/DL (ref 31–36)
MCV RBC AUTO: 90.6 FL (ref 80–100)
MONOCYTES ABSOLUTE: 0.7 K/UL (ref 0–1.3)
MONOCYTES RELATIVE PERCENT: 7.6 %
NEUTROPHILS ABSOLUTE: 7.4 K/UL (ref 1.7–7.7)
NEUTROPHILS RELATIVE PERCENT: 81.1 %
PDW BLD-RTO: 13.5 % (ref 12.4–15.4)
PHOSPHORUS: 2.8 MG/DL (ref 2.5–4.9)
PLATELET # BLD: 454 K/UL (ref 135–450)
PMV BLD AUTO: 6.6 FL (ref 5–10.5)
POTASSIUM SERPL-SCNC: 3.7 MMOL/L (ref 3.5–5.1)
POTASSIUM SERPL-SCNC: 4.4 MMOL/L (ref 3.5–5.1)
PROTHROMBIN TIME: 13.6 SEC (ref 10–13.2)
RBC # BLD: 3.16 M/UL (ref 4–5.2)
SODIUM BLD-SCNC: 133 MMOL/L (ref 136–145)
SODIUM BLD-SCNC: 135 MMOL/L (ref 136–145)
VANCOMYCIN TROUGH: 25.4 UG/ML (ref 10–20)
VANCOMYCIN TROUGH: 7.9 UG/ML (ref 10–20)
WBC # BLD: 9.1 K/UL (ref 4–11)

## 2020-12-10 PROCEDURE — 85025 COMPLETE CBC W/AUTO DIFF WBC: CPT

## 2020-12-10 PROCEDURE — 6360000002 HC RX W HCPCS: Performed by: INTERNAL MEDICINE

## 2020-12-10 PROCEDURE — 2500000003 HC RX 250 WO HCPCS: Performed by: RADIOLOGY

## 2020-12-10 PROCEDURE — 2580000003 HC RX 258: Performed by: INTERNAL MEDICINE

## 2020-12-10 PROCEDURE — 87070 CULTURE OTHR SPECIMN AEROBIC: CPT

## 2020-12-10 PROCEDURE — 85610 PROTHROMBIN TIME: CPT

## 2020-12-10 PROCEDURE — 99233 SBSQ HOSP IP/OBS HIGH 50: CPT | Performed by: INTERNAL MEDICINE

## 2020-12-10 PROCEDURE — 6360000002 HC RX W HCPCS: Performed by: RADIOLOGY

## 2020-12-10 PROCEDURE — 1200000000 HC SEMI PRIVATE

## 2020-12-10 PROCEDURE — 6370000000 HC RX 637 (ALT 250 FOR IP): Performed by: SURGERY

## 2020-12-10 PROCEDURE — 87076 CULTURE ANAEROBE IDENT EACH: CPT

## 2020-12-10 PROCEDURE — 87077 CULTURE AEROBIC IDENTIFY: CPT

## 2020-12-10 PROCEDURE — 6370000000 HC RX 637 (ALT 250 FOR IP): Performed by: STUDENT IN AN ORGANIZED HEALTH CARE EDUCATION/TRAINING PROGRAM

## 2020-12-10 PROCEDURE — 99232 SBSQ HOSP IP/OBS MODERATE 35: CPT | Performed by: SURGERY

## 2020-12-10 PROCEDURE — 6360000004 HC RX CONTRAST MEDICATION: Performed by: STUDENT IN AN ORGANIZED HEALTH CARE EDUCATION/TRAINING PROGRAM

## 2020-12-10 PROCEDURE — 87205 SMEAR GRAM STAIN: CPT

## 2020-12-10 PROCEDURE — 6370000000 HC RX 637 (ALT 250 FOR IP): Performed by: INTERNAL MEDICINE

## 2020-12-10 PROCEDURE — 83735 ASSAY OF MAGNESIUM: CPT

## 2020-12-10 PROCEDURE — 74177 CT ABD & PELVIS W/CONTRAST: CPT

## 2020-12-10 PROCEDURE — 2709999900 CT DRAINAGE HEMATOMA/SEROMA/FLUID COLLECTION

## 2020-12-10 PROCEDURE — 0W9F30Z DRAINAGE OF ABDOMINAL WALL WITH DRAINAGE DEVICE, PERCUTANEOUS APPROACH: ICD-10-PCS | Performed by: STUDENT IN AN ORGANIZED HEALTH CARE EDUCATION/TRAINING PROGRAM

## 2020-12-10 PROCEDURE — 80202 ASSAY OF VANCOMYCIN: CPT

## 2020-12-10 PROCEDURE — 80069 RENAL FUNCTION PANEL: CPT

## 2020-12-10 PROCEDURE — 36415 COLL VENOUS BLD VENIPUNCTURE: CPT

## 2020-12-10 PROCEDURE — 87075 CULTR BACTERIA EXCEPT BLOOD: CPT

## 2020-12-10 PROCEDURE — 87186 SC STD MICRODIL/AGAR DIL: CPT

## 2020-12-10 RX ORDER — FENTANYL CITRATE 50 UG/ML
100 INJECTION, SOLUTION INTRAMUSCULAR; INTRAVENOUS ONCE
Status: COMPLETED | OUTPATIENT
Start: 2020-12-10 | End: 2020-12-10

## 2020-12-10 RX ORDER — MIDAZOLAM HYDROCHLORIDE 1 MG/ML
2 INJECTION INTRAMUSCULAR; INTRAVENOUS ONCE
Status: COMPLETED | OUTPATIENT
Start: 2020-12-10 | End: 2020-12-10

## 2020-12-10 RX ORDER — POTASSIUM CHLORIDE 20 MEQ/1
20 TABLET, EXTENDED RELEASE ORAL ONCE
Status: COMPLETED | OUTPATIENT
Start: 2020-12-10 | End: 2020-12-10

## 2020-12-10 RX ORDER — POLYETHYLENE GLYCOL 3350 17 G/17G
17 POWDER, FOR SOLUTION ORAL DAILY
Status: DISCONTINUED | OUTPATIENT
Start: 2020-12-10 | End: 2020-12-14 | Stop reason: HOSPADM

## 2020-12-10 RX ADMIN — CEFEPIME HYDROCHLORIDE 2 G: 2 INJECTION, POWDER, FOR SOLUTION INTRAVENOUS at 00:30

## 2020-12-10 RX ADMIN — POLYETHYLENE GLYCOL 3350 17 G: 17 POWDER, FOR SOLUTION ORAL at 16:07

## 2020-12-10 RX ADMIN — Medication 10 ML: at 17:05

## 2020-12-10 RX ADMIN — CEFEPIME HYDROCHLORIDE 2 G: 2 INJECTION, POWDER, FOR SOLUTION INTRAVENOUS at 10:44

## 2020-12-10 RX ADMIN — DOCUSATE SODIUM 100 MG: 100 CAPSULE ORAL at 16:07

## 2020-12-10 RX ADMIN — LIDOCAINE HYDROCHLORIDE 2 ML: 20 INJECTION, SOLUTION EPIDURAL; INFILTRATION; INTRACAUDAL; PERINEURAL at 13:28

## 2020-12-10 RX ADMIN — CEFEPIME HYDROCHLORIDE 2 G: 2 INJECTION, POWDER, FOR SOLUTION INTRAVENOUS at 23:01

## 2020-12-10 RX ADMIN — FENTANYL CITRATE 100 MCG: 50 INJECTION, SOLUTION INTRAMUSCULAR; INTRAVENOUS at 13:22

## 2020-12-10 RX ADMIN — DOCUSATE SODIUM 100 MG: 100 CAPSULE ORAL at 20:55

## 2020-12-10 RX ADMIN — Medication 10 ML: at 10:45

## 2020-12-10 RX ADMIN — IOPAMIDOL 80 ML: 755 INJECTION, SOLUTION INTRAVENOUS at 13:45

## 2020-12-10 RX ADMIN — IOHEXOL 50 ML: 240 INJECTION, SOLUTION INTRATHECAL; INTRAVASCULAR; INTRAVENOUS; ORAL at 13:46

## 2020-12-10 RX ADMIN — POTASSIUM CHLORIDE 20 MEQ: 1500 TABLET, EXTENDED RELEASE ORAL at 10:45

## 2020-12-10 RX ADMIN — CEFEPIME HYDROCHLORIDE 2 G: 2 INJECTION, POWDER, FOR SOLUTION INTRAVENOUS at 17:03

## 2020-12-10 RX ADMIN — VANCOMYCIN HYDROCHLORIDE 750 MG: 10 INJECTION, POWDER, LYOPHILIZED, FOR SOLUTION INTRAVENOUS at 19:05

## 2020-12-10 RX ADMIN — VANCOMYCIN HYDROCHLORIDE 750 MG: 10 INJECTION, POWDER, LYOPHILIZED, FOR SOLUTION INTRAVENOUS at 05:05

## 2020-12-10 RX ADMIN — MIDAZOLAM HYDROCHLORIDE 2 MG: 2 INJECTION, SOLUTION INTRAMUSCULAR; INTRAVENOUS at 13:22

## 2020-12-10 ASSESSMENT — PAIN SCALES - GENERAL
PAINLEVEL_OUTOF10: 0
PAINLEVEL_OUTOF10: 8
PAINLEVEL_OUTOF10: 0
PAINLEVEL_OUTOF10: 6
PAINLEVEL_OUTOF10: 0
PAINLEVEL_OUTOF10: 0

## 2020-12-10 ASSESSMENT — PAIN DESCRIPTION - ONSET: ONSET: ON-GOING

## 2020-12-10 ASSESSMENT — PAIN DESCRIPTION - ORIENTATION: ORIENTATION: RIGHT;LOWER

## 2020-12-10 ASSESSMENT — PAIN DESCRIPTION - DESCRIPTORS: DESCRIPTORS: DISCOMFORT

## 2020-12-10 ASSESSMENT — PAIN DESCRIPTION - FREQUENCY: FREQUENCY: CONTINUOUS

## 2020-12-10 ASSESSMENT — PAIN DESCRIPTION - PAIN TYPE: TYPE: ACUTE PAIN

## 2020-12-10 ASSESSMENT — PAIN - FUNCTIONAL ASSESSMENT: PAIN_FUNCTIONAL_ASSESSMENT: ACTIVITIES ARE NOT PREVENTED

## 2020-12-10 ASSESSMENT — PAIN DESCRIPTION - PROGRESSION: CLINICAL_PROGRESSION: NOT CHANGED

## 2020-12-10 ASSESSMENT — PAIN DESCRIPTION - LOCATION: LOCATION: FLANK;BACK

## 2020-12-10 NOTE — CARE COORDINATION
Neurosurgery update: \"Remain inpatient until abx plan finalized neurosurgery standpoint. Dispo timing to be determined by primary team once patient is medically stable for discharge. \" ID stateing that patient will have a long course of antibiotics. CM will continue to follow patient until discharge. Awaiting PT/OT evals with recommendations.  Electronically signed by Magda Porter RN on 12/10/2020 at 11:38 AM

## 2020-12-10 NOTE — CONSULTS
Clinical Pharmacy Progress Note    Admit date: 12/7/2020     Subjective/Objective:  Pt is a 76yof with PMHx that includes arthritis who is s/p Right L4-L5 lumbar fusion surgery on 11/24/20. Pt now presents with fevers and leg / abdominal swelling - found on imaging to have large complex right iliopsoas abscess. Interval update:   Tmax 100.5 F in past 24h. Vancomycin level drawn while dose infusing, level re-ordered for this afternoon. Pharmacy is consulted to dose Vancomycin per Dr. Josephine Royal and Pavithra Badillo    Current antibiotics:  Cefepime 2g IV q8h - day #3  Vancomycin - Pharmacy to dose -day #3   750mg IV q12h (12/8 - current)      Recent Labs     12/08/20  0600 12/10/20  0602   * 133*   K 3.8 3.7   CL 94* 98*   CO2 29 29   BUN 14 9   CREATININE 0.5* <0.5*   GLUCOSE 140* 132*       CrCl cannot be calculated (This lab value cannot be used to calculate CrCl because it is not a number: <0.5). Lab Results   Component Value Date    WBC 9.1 12/10/2020    HGB 9.7 (L) 12/10/2020    HCT 28.6 (L) 12/10/2020    MCV 90.6 12/10/2020     (H) 12/10/2020       Lab Results   Component Value Date    PROTIME 13.0 12/08/2020    INR 1.12 12/08/2020       Height:  5' 5\" (165.1 cm)  Weight:  121 lb 11.1 oz (55.2 kg)    Vancomycin Levels:  Trough (12/10 @ 06:02) = 25.4 mcg/mL (drawn one-hour after dose hung)  Trough (12/10 @ 16:30) = ORDERED     Culture results:  Abscess fluid (12/8) = 2+ GNR, final ID pending    Prophylaxis:  VTE:  SCD's  GI:  Not indicated      Assessment/Plan:  1)  Large complex right iliopsoas abscess s/p L4-L5 lumbar fusion 2 weeks ago:  Cefepime + Vancomycin - day #3  · Cefepime -   Current dose remains appropriate based on indication and current renal function. Will continue to monitor and adjust as needed per [facility] Renal Dose Adjustment Policy. · Vancomycin - Pharmacy to dose  · Trough this morning drawn after AM dose was hung, inaccurate level. Will re-draw level with afternoon dose.  RN and MD aware of plan. · Continue 750mg IV q12h. · Clinical condition will be monitored closely, and levels will be ordered as clinically indicated. Please call with questions--  Thanks--  Jose Escamilla, PharmD, BCPS  Wireless: [address] pharmacy: [id number]  12/10/2020 9:16 AM    ADDENDUM:  Trough drawn accurately this afternoon. Level sub-therapeutic at 7.9 mcg/mL and patient continues to be febrile. The dosing interval has now been adjusted with the regimen changed to Vancomycin 750 mg q8h. The clinical pharmacy team will follow up tomorrow.

## 2020-12-10 NOTE — DISCHARGE INSTR - DIET
Good nutrition is important when healing from an illness, injury, or surgery. Follow any nutrition recommendations given to you during your hospital stay. If you were given an oral nutrition supplement while in the hospital, continue to take this supplement at home. You can take it with meals, in-between meals, and/or before bedtime. These supplements can be purchased at most local grocery stores, pharmacies, and chain "[employer]"-stores. If you have any questions about your diet or nutrition, call the hospital and ask for the dietitian.

## 2020-12-10 NOTE — PROGRESS NOTES
Temperature is 100.1. Offered acetaminophen, however patient stated \"I don't want anymore fluids in me\". Will continue to monitor per protocol.

## 2020-12-10 NOTE — H&P
Patient:  Herminia Toscano   :   1950      Relevant clinical history, particularly as it involves the pending procedure, was reviewed and discussed. The procedure including risks and benefits was discussed at length with the patient (or designated family member) and all questions were answered. Informed consent to proceed with the procedure was given. Vital signs were monitored and documented by the Radiology nurse. Targeted physical examination  Heart : regular rate and rhythm  Lungs : clear, breathing easily  Condition : stable    [facility] nurses notes reviewed and agreed. Past Medical History:        Diagnosis Date    Arthritis     Dental crowns present        Past Surgical History:           Procedure Laterality Date    COLONOSCOPY      CYST REMOVAL Right     arm    CYST REMOVAL Left     abod left eye    KNEE ARTHROSCOPY Left 11/3/15    with partial lateral meniscectomy    LUMBAR FUSION Right 2020    RIGHT L4-5 LATERAL LUMBAR INTERBODY FUSION WITH PEDICLE SCREW FIXATION performed by Gisell Whyte MD at [facility]:  Patient has no known allergies. Medications:   Home Meds  No current facility-administered medications on file prior to encounter.       Current Outpatient Medications on File Prior to Encounter   Medication Sig Dispense Refill    sennosides-docusate sodium (SENOKOT-S) 8.6-50 MG tablet Take 2 tablets by mouth 2 times daily      polyethylene glycol (GLYCOLAX) 17 g packet Take 17 g by mouth daily 527 g 1    Multiple Vitamin (MULTIVITAMIN PO) Take by mouth      Calcium Carbonate-Vitamin D 300-100 MG-UNIT CAPS Take 1 tablet by mouth 2 times daily          Current Meds  polyethylene glycol (GLYCOLAX) packet 17 g, Daily  iopamidol (ISOVUE-370) 76 % injection 80 mL, ONCE PRN  iohexol (OMNIPAQUE 240) injection 50 mL, ONCE PRN  sodium chloride flush 0.9 % injection 10 mL, 2 times per day  sodium chloride flush 0.9 % injection 10 mL, PRN  [Held by provider] enoxaparin (LOVENOX) injection 40 mg, Daily  vancomycin (VANCOCIN) 750 mg in dextrose 5 % 250 mL IVPB, Q12H  docusate sodium (COLACE) capsule 100 mg, BID  sodium chloride flush 0.9 % injection 10 mL, 2 times per day  sodium chloride flush 0.9 % injection 10 mL, PRN  acetaminophen (TYLENOL) tablet 650 mg, Q6H PRN  cefepime (MAXIPIME) 2 g IVPB minibag, Q8H          ASA 2 - Patient with mild systemic disease with no functional limitations    II (soft palate, uvula, fauces visible)    Activity:  2 - Able to move 4 extremities voluntarily on command  Respiration:  2 - Able to breathe deeply and cough freely  Circulation:  2 - BP+/- 20mmHg of normal  Consciousness:  2 - Fully awake  Oxygen Saturation (color):  2 - Able to maintain oxygen saturation >92% on room air    Sedation : Moderate sedation planned

## 2020-12-10 NOTE — PROGRESS NOTES
Surgery Daily Progress Note  Sridevi Specking    CC:Concern for bowel perforation    Subjective :  No acute events overnight. Tolerating pain well. Patient denies any N/V, passing flatus and is having small lose bowel movements. Fever to 100.5 and hemodynamically stable. Objective    Infusions:     I/O:I/O last 3 completed shifts:  In: -   Out: 15 [Drains:15]           Wt Readings from Last 1 Encounters:   12/10/20 121 lb 11.1 oz (55.2 kg)                 LABS:   Recent Labs     12/08/20  0600   WBC 8.4   HGB 10.4*   HCT 30.6*   MCV 88.5   *        Recent Labs     12/07/20  2255 12/08/20  0600   * 132*   K 4.1 3.8   CL 92* 94*   CO2 28 29   PHOS 3.1 3.1   BUN 13 14   CREATININE <0.5* 0.5*      No results for input(s): AST, ALT, ALB, BILIDIR, BILITOT, ALKPHOS in the last 72 hours. No results for input(s): LIPASE, AMYLASE in the last 72 hours. Recent Labs     12/08/20  0600   INR 1.12   APTT 29.1      No results for input(s): CKTOTAL, CKMB, CKMBINDEX, TROPONINI in the last 72 hours. Exam:  /69   Pulse 90   Temp 98.1 °F (36.7 °C) (Oral)   Resp 18   Ht 5' 5\" (1.651 m)   Wt 121 lb 11.1 oz (55.2 kg)   SpO2 95%   BMI 20.25 kg/m²     CONSTITUTIONAL:  awake, alert, cooperative, no apparent distress  HEENT: No palpable lymphadenopathy, no icterus  LUNGS: No increased work of breathing, clear to auscultation bilaterally, no crackles or wheezing  CV:  Regular rate and rhythm, normal S1 and S2, and no murmur noted  ABDOMEN: Soft, non-distended, RLQ to right flank tenderness, incision sites to right flank with erythema extending to R hip    ASSESSMENT/PLAN:   Pt. is a 79 y.o. female with Hx of L4-5 fusion on 11/24/20 presented with 1 week hx of fevers, abd pain, swelling. CT with right psoas abscess for which IR placed perc drain on 12/8 and aspirated 15cc hemo purulent fluid.      · Continue serial abd exams; if abd worsens will  consider abd CT with oral contrast. Low suspicion for  bowel perforation. · Repeat CT scan to evaluate for second abscess cavity in the preperitoneal space  · resume diet and bowel regimen after repeat CT scan  · Continue abx per ID   · Continue PRN pain medications  · Encourage ambulation      Angus Miller, MS3  5:56 AM  12/10/2020     Addendum:    Patient seen and examined with Medical Student and Surgical Team.  Agree with assessment and plan with changes made accordingly. Orville Hercules MD  General Surgery Resident  12/10/20  7:01 AM  [phone]    I have seen, examined, and reviewed the patients chart. I agree with the residents assessment and have made appropriate changes.     Paul Payne

## 2020-12-10 NOTE — PROGRESS NOTES
ID Follow-up NOTE    CC:   Lumbar SSI, Psoas abscess, Retroperitoneal abscess  Antibiotics: Vancomycin, Cefepime    Admit Date: 2020  Hospital Day: 4    Subjective:     Patient reports less R abd pain today      Objective:     Patient Vitals for the past 8 hrs:   BP Temp Temp src Pulse Resp SpO2 Weight   12/10/20 0839 (!) 141/64 97.7 °F (36.5 °C) Oral 85 18 96 % --   12/10/20 0549 -- -- -- -- -- -- 121 lb 11.1 oz (55.2 kg)   12/10/20 0354 111/69 98.1 °F (36.7 °C) Oral 90 18 95 % --     I/O last 3 completed shifts:  In: -   Out: 315 [Urine:300; Drains:15]  No intake/output data recorded. EXAM:  GENERAL: No apparent distress. RA  HEENT: Membranes moist, no oral lesion  NECK:  Supple, no lymphadenopathy  LUNGS: Clear b/l, no rales, no dullness  CARDIAC: RRR, no murmur appreciated  ABD:  + BS, soft - R LQ / lateral lower abd tenderness - no guarding / rebound  R paraspinal drain (purulent drainage)  EXT:  No rash, no edema, no lesions  NEURO: No focal neurologic findings  PSYCH: Orientation, sensorium, mood normal  LINES:  L PICC - placed        Data Review:  Lab Results   Component Value Date    WBC 9.1 12/10/2020    HGB 9.7 (L) 12/10/2020    HCT 28.6 (L) 12/10/2020    MCV 90.6 12/10/2020     (H) 12/10/2020     Lab Results   Component Value Date    CREATININE <0.5 (L) 12/10/2020    BUN 9 12/10/2020     (L) 12/10/2020    K 4.4 12/10/2020    CL 98 (L) 12/10/2020    CO2 30 12/10/2020       Hepatic Function Panel:   Lab Results   Component Value Date    LABALBU 2.9 12/10/2020       MICRO:   Abscess GS : 4+ WBC (PMN), 2+ GNR     Imagin/7 CT abdomen/pelvis - [facility]  MUSCULOSKELETAL: [place] is ectopic gas identified along the right lateral abdominal wall musculature. There is a elliptical fluid collection in the right lateral abdominal wall with air-fluid level.  The collection measures approximately 8.5 x 2.4 cm in greatest transaxial dimensions. There is a complex rim-enhancing fluid collection in the right ileal psoas muscle consistent with an abscess. The collection measures approximately 4.1 x 2.6 cm on transaxial dimensions and up to 8.3 cm in craniocaudad dimension. There are surgical changes of the lumbosacral spine with posterior surgical fusion at L4-5 with intervertebral disc prosthesis. Ectopic gas pockets identified at the disc space at L4-5 that may be postsurgical in nature. There is high dense material extending from the right iliopsoas region to the posterior lateral aspect of the disc space that may be related to extruded material from the surgical prosthesis. Ectopic gas also identified extending into the epicardial region of the   anterior lower chest and right posterior retroperitoneum. 1. Large complex right iliopsoas abscess as described above. 2. Fluid collection with air-fluid level associated with large amount of ectopic gas along the right lateral abdominal wall. Scheduled Meds:   polyethylene glycol  17 g Oral Daily    sodium chloride flush  10 mL Intravenous 2 times per day    [Held by provider] enoxaparin  40 mg Subcutaneous Daily    vancomycin  750 mg Intravenous Q12H    docusate sodium  100 mg Oral BID    sodium chloride flush  10 mL Intravenous 2 times per day    cefepime  2 g Intravenous Q8H       Continuous Infusions:      PRN Meds:  sodium chloride flush, sodium chloride flush, acetaminophen **OR** [DISCONTINUED] acetaminophen      Assessment:     78 yo woman with hx spinal stenosis, OA  Hx R l4/5 lumbar fusion 11/24 ([address], Dr Kel Erickson).       Presents with [facility] with 1 week fever / R side pain. CT with 'large R complex ileopsoas abscess'.  At [place] - SARS CoV-2 PCR neg.    Transferred to [facility] and admit 12/7 -- T 99.6, WBC 8.4  12/8 - IR aspiration pus, placed drain.   GS 2+ GNR  12/10 - IR drain, R lateral abd collection, pus     IMP/  Lumbar SSI - s/p aspiration / drain  R psoas abscess  R lateral abd wall / retroperitoneum abscess    Plan:     Cont vancomycin + cefepime for now  Will f/u on Psoas abscess cult (GS with GNR)  New IR drain - R abd wall  R psoas drain per Neurosurg / IR     Anticipate long course of antibiotics (SSI with hardware)  Need cult result to determine choice of antibiotics    Discussed with pt, Radiologist  Eudora Brittle, MD

## 2020-12-10 NOTE — PROGRESS NOTES
NEUROSURGERY PROGRESS NOTE    Romain Jacques  [id number]   1950   12/10/2020    Subjective: Patient sitting up in bed upon entering the room. No acute events overnight. Patient continues to c/o right side flank pain. Objective:  BP (!) 141/64   Pulse 85   Temp 97.7 °F (36.5 °C) (Oral)   Resp 18   Ht 5' 5\" (1.651 m)   Wt 121 lb 11.1 oz (55.2 kg)   SpO2 96%   BMI 20.25 kg/m²     Physical Exam:   Patient seen and examined  GCS:  4 - Opens eyes on own  5 - Alert and oriented  6 - Follows simple motor commands  General: Well developed. Alert and cooperative in no acute distress. HENT: atraumatic, neck supple  Eyes: Optic discs: Not tested  Pulmonary: unlabored respiratory effort  Cardiovascular:  Warm well perfused. No peripheral edema  Gastrointestinal: abdomen soft, NT, ND    Neurological:  Mental Status: Awake, alert, oriented x 4, speech clear and appropriate  Attention: Intact  Language: No aphasia or dysarthria noted  Sensation: Intact to all extremities to light touch  Coordination: Intact    Musculoskeletal:   Gait: Not tested   Assist devices: None   Tone: Normal  Motor strength:    Right  Left    Right  Left    Deltoid  5 5  Hip Flex  5 5   Biceps  5 5  Knee Extensors  5 5   Triceps  5 5  Knee Flexors  5 5   Wrist Ext  5 5  Ankle Dorsiflex. 5 5   Wrist Flex  5 5  Ankle Plantarflex. 5 5   Handgrip  5 5  Ext Narendra Longus  5 5   Thumb Ext  5 5         Incisions: CDI    Drain: 15 mL in past 24 hours of serosanguinous drainage. Radiological Findings:  CT Abd/Pelvis w contrast  OSH  Result Date: 12/7/2020  1. Large complex right iliopsoas abscess as described above. 2. Fluid collection with air-fluid level associated with large amount of ectopic gas along the right lateral abdominal wall.     Labs:  Recent Labs     12/10/20  0602   WBC 9.1   HGB 9.7*   HCT 28.6*   *       Recent Labs     12/08/20  0600 12/10/20  0602   * 133*   K 3.8 3.7   CL 94* 98*   CO2 29 29   BUN 14 9 CREATININE 0.5* <0.5*   GLUCOSE 140* 132*   CALCIUM 9.5 9.3   PHOS 3.1  --        Recent Labs     12/08/20  0600   PROTIME 13.0   INR 1.12   APTT 29.1       Patient Active Problem List    Diagnosis Date Noted    Deep incisional surgical site infection 12/08/2020    Psoas abscess ([facility] [address] 12/07/2020    Spondylolisthesis of lumbar region 11/24/2020    S/P lumbar fusion 11/24/2020    Knee pain, left 09/30/2015    Lateral meniscus tear 09/30/2015       Assessment:  Patient is a 79 y.o. female s/p RIGHT L4-5 LATERAL LUMBAR INTERBODY FUSION WITH PEDICLE SCREW FIXATION by Dr. Carmen Claire on 11/24/2020 now w/ large complex right iliopsoas abscess. Plan:  1. Neurologic exams frequency: Q4H  2. For change in exam MUST contact neurosurgery team along with critical care or primary team  3. Drained abscess which showed GNR on stain, but NGTD on culture  4. ID following for abx management  5. General Surgery following, appreciate recs  6. Pain control: Managed by medical team  7. PT/OT following, appreciate recs  8. ADAT  9. Will follow inpatient. Please call with any questions or decline in neurological status    DISPO: Remain inpatient until abx plan finalized neurosurgery standpoint. Dispo timing to be determined by primary team once patient is medically stable for discharge. Patient was seen and examined with Dr. Carmen Claire who agrees with above assessment and plan.      Electronically signed by: CHADWICK Huitron, 12/10/2020 8:45 AM  [phone]

## 2020-12-10 NOTE — PROCEDURES
IR Brief Postoperative Note    Shan Ling  YOB: 1950  [id number]    Pre-operative Diagnosis: abscess    Post-operative Diagnosis: Same    Procedure: right abdominal wall drain    Anesthesia: moderate    Surgeons/Assistants: samira    Estimated Blood Loss: Minimal    Complications: none    Specimens: were obtained    See full procedure dictation to follow      Jose Manuel Acosta MD  12/10/2020

## 2020-12-10 NOTE — SEDATION DOCUMENTATION
IMAGING SERVICES NURSING PROGRESS NOTE    Procedure:  Drain Placement  December 10, 2020  Sean Goetz      Allergies:  No Known Allergies    Vitals:    12/10/20 1314   BP: (!) 141/68   Pulse: 103   Resp: 16   Temp:    SpO2: 100%       Recent lab work reviewed with MD: yes   Procedure explained to patient by MD: yes   Informed consent obtained:yes  Family with patient:inpatient    Mental Status:  Normal  Readiness to learn:  Yes  Barriers to learning: No    Pain Assessment Pre-Procedure:  Pain Present:  yes  Pain Score:  8  Pain Quality/Description:  Frank Held    Time out Procedure Verification with:  [x] RN  [x] Physician  [x] Patient  [x] Other: CT Technologist  Procedure site marked, if applicable:  Yes    Note: Patient arrived A & O x 4, denies pain, breathing easily on room air, Spoke to Dr. Kennedy Mtz prior to procedure. Procedural sedation:  Fentanyl: 50 mcg  Versed:   1 mg  Post Procedureal Note:  Patient tolerated procedure well. drain  . Breathing easily on room air. Report given to   RN. Patient transported in stable conditon to room 3307. Pain Assessment Post-Procedure:  Pain Present:  no  Pain Score:  0  Pain Quality/Description:      Plan of Care Goals:  Safety measures met:  Yes  Patient understands explanation of procedure:  Yes    Time in:  1313  Time out:  [address]   R.N. 12/10/2020

## 2020-12-10 NOTE — FLOWSHEET NOTE
12/09/20 2026   Encounter Summary   Services provided to: Patient   Referral/Consult From: [facility]ata   Continue Visiting   (12/9/2020, JOYCELYN. )   Complexity of Encounter Moderate   Length of Encounter 15 minutes   Routine   Type Initial   Assessment Approachable   Intervention Nurtured hope   Outcome Expressed gratitude

## 2020-12-11 LAB
ANION GAP SERPL CALCULATED.3IONS-SCNC: 8 MMOL/L (ref 3–16)
BASOPHILS ABSOLUTE: 0 K/UL (ref 0–0.2)
BASOPHILS RELATIVE PERCENT: 0.4 %
BUN BLDV-MCNC: 9 MG/DL (ref 7–20)
CALCIUM SERPL-MCNC: 9.8 MG/DL (ref 8.3–10.6)
CHLORIDE BLD-SCNC: 98 MMOL/L (ref 99–110)
CO2: 29 MMOL/L (ref 21–32)
CREAT SERPL-MCNC: <0.5 MG/DL (ref 0.6–1.2)
EOSINOPHILS ABSOLUTE: 0.1 K/UL (ref 0–0.6)
EOSINOPHILS RELATIVE PERCENT: 0.6 %
GFR AFRICAN AMERICAN: >60
GFR NON-AFRICAN AMERICAN: >60
GLUCOSE BLD-MCNC: 108 MG/DL (ref 70–99)
HCT VFR BLD CALC: 29.5 % (ref 36–48)
HEMOGLOBIN: 10 G/DL (ref 12–16)
LYMPHOCYTES ABSOLUTE: 1.6 K/UL (ref 1–5.1)
LYMPHOCYTES RELATIVE PERCENT: 15.9 %
MCH RBC QN AUTO: 30.1 PG (ref 26–34)
MCHC RBC AUTO-ENTMCNC: 33.8 G/DL (ref 31–36)
MCV RBC AUTO: 88.9 FL (ref 80–100)
MONOCYTES ABSOLUTE: 0.9 K/UL (ref 0–1.3)
MONOCYTES RELATIVE PERCENT: 8.9 %
NEUTROPHILS ABSOLUTE: 7.3 K/UL (ref 1.7–7.7)
NEUTROPHILS RELATIVE PERCENT: 74.2 %
PDW BLD-RTO: 13.6 % (ref 12.4–15.4)
PLATELET # BLD: 502 K/UL (ref 135–450)
PMV BLD AUTO: 6.7 FL (ref 5–10.5)
POTASSIUM SERPL-SCNC: 4.2 MMOL/L (ref 3.5–5.1)
RBC # BLD: 3.32 M/UL (ref 4–5.2)
SODIUM BLD-SCNC: 135 MMOL/L (ref 136–145)
WBC # BLD: 9.9 K/UL (ref 4–11)

## 2020-12-11 PROCEDURE — 2580000003 HC RX 258: Performed by: INTERNAL MEDICINE

## 2020-12-11 PROCEDURE — 6360000002 HC RX W HCPCS: Performed by: INTERNAL MEDICINE

## 2020-12-11 PROCEDURE — 99232 SBSQ HOSP IP/OBS MODERATE 35: CPT | Performed by: INTERNAL MEDICINE

## 2020-12-11 PROCEDURE — 85025 COMPLETE CBC W/AUTO DIFF WBC: CPT

## 2020-12-11 PROCEDURE — 6360000002 HC RX W HCPCS: Performed by: STUDENT IN AN ORGANIZED HEALTH CARE EDUCATION/TRAINING PROGRAM

## 2020-12-11 PROCEDURE — 97116 GAIT TRAINING THERAPY: CPT

## 2020-12-11 PROCEDURE — 97110 THERAPEUTIC EXERCISES: CPT

## 2020-12-11 PROCEDURE — 6370000000 HC RX 637 (ALT 250 FOR IP): Performed by: STUDENT IN AN ORGANIZED HEALTH CARE EDUCATION/TRAINING PROGRAM

## 2020-12-11 PROCEDURE — 6370000000 HC RX 637 (ALT 250 FOR IP): Performed by: INTERNAL MEDICINE

## 2020-12-11 PROCEDURE — 1200000000 HC SEMI PRIVATE

## 2020-12-11 PROCEDURE — 97535 SELF CARE MNGMENT TRAINING: CPT

## 2020-12-11 PROCEDURE — 97530 THERAPEUTIC ACTIVITIES: CPT

## 2020-12-11 PROCEDURE — 80048 BASIC METABOLIC PNL TOTAL CA: CPT

## 2020-12-11 PROCEDURE — 99232 SBSQ HOSP IP/OBS MODERATE 35: CPT | Performed by: SURGERY

## 2020-12-11 RX ADMIN — VANCOMYCIN HYDROCHLORIDE 750 MG: 10 INJECTION, POWDER, LYOPHILIZED, FOR SOLUTION INTRAVENOUS at 22:00

## 2020-12-11 RX ADMIN — POLYETHYLENE GLYCOL 3350 17 G: 17 POWDER, FOR SOLUTION ORAL at 07:54

## 2020-12-11 RX ADMIN — DOCUSATE SODIUM 100 MG: 100 CAPSULE ORAL at 07:54

## 2020-12-11 RX ADMIN — VANCOMYCIN HYDROCHLORIDE 750 MG: 10 INJECTION, POWDER, LYOPHILIZED, FOR SOLUTION INTRAVENOUS at 01:42

## 2020-12-11 RX ADMIN — VANCOMYCIN HYDROCHLORIDE 750 MG: 10 INJECTION, POWDER, LYOPHILIZED, FOR SOLUTION INTRAVENOUS at 09:08

## 2020-12-11 RX ADMIN — Medication 10 ML: at 09:08

## 2020-12-11 RX ADMIN — CEFEPIME HYDROCHLORIDE 2 G: 2 INJECTION, POWDER, FOR SOLUTION INTRAVENOUS at 21:07

## 2020-12-11 RX ADMIN — DOCUSATE SODIUM 100 MG: 100 CAPSULE ORAL at 21:08

## 2020-12-11 RX ADMIN — ENOXAPARIN SODIUM 40 MG: 40 INJECTION, SOLUTION INTRAVENOUS; SUBCUTANEOUS at 07:54

## 2020-12-11 RX ADMIN — Medication 10 ML: at 09:07

## 2020-12-11 RX ADMIN — CEFEPIME HYDROCHLORIDE 2 G: 2 INJECTION, POWDER, FOR SOLUTION INTRAVENOUS at 07:53

## 2020-12-11 RX ADMIN — ACETAMINOPHEN 650 MG: 325 TABLET ORAL at 22:32

## 2020-12-11 ASSESSMENT — PAIN DESCRIPTION - PROGRESSION
CLINICAL_PROGRESSION: NOT CHANGED

## 2020-12-11 ASSESSMENT — PAIN SCALES - GENERAL
PAINLEVEL_OUTOF10: 6
PAINLEVEL_OUTOF10: 6

## 2020-12-11 ASSESSMENT — PAIN DESCRIPTION - ORIENTATION: ORIENTATION: RIGHT;LOWER

## 2020-12-11 ASSESSMENT — PAIN DESCRIPTION - LOCATION: LOCATION: BACK;FLANK

## 2020-12-11 ASSESSMENT — PAIN DESCRIPTION - PAIN TYPE: TYPE: ACUTE PAIN

## 2020-12-11 NOTE — CARE COORDINATION
Case Management Assessment           Daily Note                 Date/ Time of Note: 12/11/2020 6:18 PM         Note completed by: Saundra Mcmahon    Patient Name: Rosi Gamez  YOB: 1950    Diagnosis:Psoas abscess [facility]) [K68.12]  Psoas abscess [facility]) [K68.12]  Patient Admission Status: Inpatient    Date of Admission:12/7/2020  8:30 PM Length of Stay: 4 GLOS:      Current Plan of Care: Home/HHC  ________________________________________________________________________________________  PT AM-PAC: 23 / 24 per last evaluation on: 12/9    OT AM-PAC: 18 / 24 per last evaluation on: 12/11    DME Needs for discharge: pending  ________________________________________________________________________________________  Discharge Plan: Home with [address] [place] [address] Way: [facility]    Tentative discharge date: pending    Current barriers to discharge: medically stable, antibiotic orders     Referrals completed: Not Applicable    Resources/ information provided: [facility] List  ________________________________________________________________________________________  Case Management Notes:  Fr home. Plans to discharge to home. Patient stated family will transport when she is discharged. Per Therapy recs, patient needing HHC at discharge. Referral sent to [facility]. Moira Sommers and her family were provided with choice of provider; she and her family are in agreement with the discharge plan.     Care Transition Patient: Amy Mcmahon RN  [facility] [employer]  Case Management Department  Ph: [phone]  Fax: [phone]'

## 2020-12-11 NOTE — PROGRESS NOTES
NEUROSURGERY PROGRESS NOTE    Romain Jacques  [id number]   1950 12/11/2020    Subjective: Patient sitting up in bed upon entering the room. No acute events overnight. Patient continues to c/o right side flank pain, but patient looks overall in better spirits. Objective:  /64   Pulse 92   Temp 97.9 °F (36.6 °C) (Oral)   Resp 16   Ht 5' 5\" (1.651 m)   Wt 121 lb 11.1 oz (55.2 kg)   SpO2 96%   BMI 20.25 kg/m²     Physical Exam:   Patient seen and examined  GCS:  4 - Opens eyes on own  5 - Alert and oriented  6 - Follows simple motor commands  General: Well developed. Alert and cooperative in no acute distress. HENT: atraumatic, neck supple  Eyes: Optic discs: Not tested  Pulmonary: unlabored respiratory effort  Cardiovascular:  Warm well perfused. No peripheral edema  Gastrointestinal: abdomen soft, NT, ND    Neurological:  Mental Status: Awake, alert, oriented x 4, speech clear and appropriate  Attention: Intact  Language: No aphasia or dysarthria noted  Sensation: Intact to all extremities to light touch  Coordination: Intact    Musculoskeletal:   Gait: Not tested   Assist devices: None   Tone: Normal  Motor strength:    Right  Left    Right  Left    Deltoid  5 5  Hip Flex  5 5   Biceps  5 5  Knee Extensors  5 5   Triceps  5 5  Knee Flexors  5 5   Wrist Ext  5 5  Ankle Dorsiflex. 5 5   Wrist Flex  5 5  Ankle Plantarflex. 5 5   Handgrip  5 5  Ext Narendra Longus  5 5   Thumb Ext  5 5         Incisions: CDI    Drains:  Back- 50 mL in past 24 hours of purulent drainage  Hip- 65 mL in past 24 hours of purulent drainage    Radiological Findings:  CT Abd/Pelvis w contrast  OSH  Result Date: 12/7/2020  1. Large complex right iliopsoas abscess as described above. 2. Fluid collection with air-fluid level associated with large amount of ectopic gas along the right lateral abdominal wall.     Labs:  Recent Labs     12/11/20  0449   WBC 9.9   HGB 10.0*   HCT 29.5*   *       Recent Labs 12/10/20  0846 12/11/20  0449   * 135*   K 4.4 4.2   CL 98* 98*   CO2 30 29   BUN 9 9   CREATININE <0.5* <0.5*   GLUCOSE 102* 108*   CALCIUM 9.8 9.8   PHOS 2.8  --    MG 2.10  --        Recent Labs     12/10/20  0847   PROTIME 13.6*   INR 1.17*       Patient Active Problem List    Diagnosis Date Noted    Deep incisional surgical site infection 12/08/2020    Psoas abscess ([address] 12/07/2020    Spondylolisthesis of lumbar region 11/24/2020    S/P lumbar fusion 11/24/2020    Knee pain, left 09/30/2015    Lateral meniscus tear 09/30/2015       Assessment:  Patient is a 79 y.o. female s/p RIGHT L4-5 LATERAL LUMBAR INTERBODY FUSION WITH PEDICLE SCREW FIXATION by Dr. Lavon Puckett on 11/24/2020 now w/ large complex right iliopsoas abscess. Plan:  1. Neurologic exams frequency: Q4H  2. For change in exam MUST contact neurosurgery team along with critical care or primary team  3. Drained abscess which showed GNR on stain, but NGTD on culture  4. ID following for abx management  5. General Surgery following and managing drains  6. Pain control: Managed by medical team  7. PT/OT following, appreciate recs  8. ADAT  9. Case Management assisting with discharge needs  10. Will follow inpatient. Please call with any questions or decline in neurological status    DISPO: Remain inpatient until abx plan finalized neurosurgery standpoint. Dispo timing to be determined by primary team once patient is medically stable for discharge. Patient was discussed with Dr. Lavon Puckett who agrees with above assessment and plan.      Electronically signed by: CHADWICK Gusman, 12/11/2020 9:15 AM  [phone]

## 2020-12-11 NOTE — PROGRESS NOTES
Occupational Therapy  Facility/Department: [address] [place] [address]  Daily Treatment Note  NAME: Todd Ramirez  :   MRN: [id number]    Date of Service: 2020    Discharge Recommendations:  Todd Ramirez scored a 18/24 on the AM-PAC ADL Inpatient form. Current research shows that an AM-PAC score of 18 or greater is typically associated with a discharge to the patient's home setting. Based on the patient's AM-PAC score, and their current ADL deficits, it is recommended that the patient have 2-3 sessions per week of Occupational Therapy at d/c to increase the patient's independence. At this time, this patient demonstrates the endurance and safety to discharge home with home services and a follow up treatment frequency of 2-3x/wk. Please see assessment section for further patient specific details. If patient discharges prior to next session this note will serve as a discharge summary. Please see below for the latest assessment towards goals. HOME HEALTH CARE: LEVEL 1 STANDARD    - Initial home health evaluation to occur within 24-48 hours, in patient home   - Therapy to evaluate with goal of regaining prior level of functioning   - Therapy to evaluate if patient has [address] [facility] [address] [facility] [address] needs for personal care       OT Equipment Recommendations  Equipment Needed: No    Assessment   Performance deficits / Impairments: Decreased functional mobility ; Decreased ADL status    Assessment: Pt demo increased functional mobility this date w/ SBA w/ RW to/from bathroom. Pt completed bed mobility w/ spvn-SBA for log rolling x3 trials. c/o pain RLE w/ WB. Pt plans to return home w/ assist of spuse (available 24hr). No DME needs. Cont OT per POC    Treatment Diagnosis: impaired ADLs/transfers 2* abd/R hip region pain  OT Education: Transfer Training;OT Role  Patient Education: Pt educated on log rolling technique and verbalized/demo understanding through multiple trials.   REQUIRES OT FOLLOW UP: Yes  Activity Tolerance  Activity Tolerance: Patient Tolerated treatment well  Activity Tolerance: pt demo self limiting behaviors 2/2 anxiety w/ mobility  Safety Devices  Safety Devices in place: Yes  Type of devices: Left in bed;Nurse notified;Call light within reach; Bed alarm in place         Patient Diagnosis(es): There were no encounter diagnoses. has a past medical history of Arthritis and Dental crowns present. has a past surgical history that includes Tonsillectomy; Colonoscopy; Knee arthroscopy (Left, 11/3/15); cyst removal (Right); cyst removal (Left); and lumbar fusion (Right, 11/24/2020). Restrictions  Position Activity Restriction  Spinal Precautions: No Bending, No Lifting, No Twisting  Other position/activity restrictions: up as tolerated  Subjective   General  Chart Reviewed: Yes  Patient assessed for rehabilitation services?: Yes  Additional Pertinent Hx: 79 y.o. F presenting w/ c/o Abdominal pain, fevers, leg swelling. Hospital Course: at [facility] ED revealed a large complex right iliopsoas abscess and decision was made to transfer patient to [facility] for further w/o; 12/8 IR placed abscess catheter; general surgery consult: concern for bowel perf.   PMHx of arthritis, spinal stenosis with neurogenic claudication, and lumbar spondylolisthesis who underwent right L4-5 lateral lumbar fusion with pedicle screw fixation on 11/24/20 with Dr. Marj Bryson at the [address] [place] [address]  Family / Caregiver Present: No  Referring Practitioner: Dr. Farooq Stanley  Diagnosis: Psoas Abscess  Subjective  Subjective: Pt in bed upon entry and agreeable to OT session      Orientation     Objective    ADL  Grooming: Independent(hand hygiene at sink w/ SBA-spvn for standing balance)  Toileting: (Simulated toileting while on standard toilet w/ SBA)        Balance  Sitting Balance: Supervision  Standing Balance: Stand by assistance  Standing Balance  Time: ~3min  Activity: Grooming tasks at sink  Comment: pt spvn-sba for 5067         Time Out 0908         Minutes 39               Timed Code Treatment Minutes:  39    Total Treatment Minutes:  [address]. Teresa Ellis

## 2020-12-11 NOTE — PROGRESS NOTES
dimensions. There is a complex rim-enhancing fluid collection in the right ileal psoas muscle consistent with an abscess. The collection measures approximately 4.1 x 2.6 cm on transaxial dimensions and up to 8.3 cm in craniocaudad dimension. There are surgical changes of the lumbosacral spine with posterior surgical fusion at L4-5 with intervertebral disc prosthesis. Ectopic gas pockets identified at the disc space at L4-5 that may be postsurgical in nature. There is high dense material extending from the right iliopsoas region to the posterior lateral aspect of the disc space that may be related to extruded material from the surgical prosthesis. Ectopic gas also identified extending into the epicardial region of the   anterior lower chest and right posterior retroperitoneum. 1. Large complex right iliopsoas abscess as described above. 2. Fluid collection with air-fluid level associated with large amount of ectopic gas along the right lateral abdominal wall. 12/10 CT Abd/pelvis  Impression: Interval decrease in size of a right psoas abscess status post percutaneous drain placement. Complex fluid and gas collection of the right lateral abdominal wall traversing the oblique musculature.        Scheduled Meds:   polyethylene glycol  17 g Oral Daily    vancomycin  750 mg Intravenous Q8H    sodium chloride flush  10 mL Intravenous 2 times per day    enoxaparin  40 mg Subcutaneous Daily    docusate sodium  100 mg Oral BID    sodium chloride flush  10 mL Intravenous 2 times per day    cefepime  2 g Intravenous Q8H       Continuous Infusions:      PRN Meds:  sodium chloride flush, sodium chloride flush, acetaminophen **OR** [DISCONTINUED] acetaminophen      Assessment:     80 yo female patient with PMHx of arthritis, spinal stenosis with neurogenic claudication, and lumbar spondylolisthesis s/p right L4-5 lateral lumbar interbody fusion with pedicle screw fixation on 11/24 at [address] [place] [address] spine [facility].     Right psoas abscess s/p CT drainage  - Recent surgery with incision on right side  - Hx of post-operative fevers and abdominal pain/tenderness  - CT A/P showing complex right psoas abscess  - R sided drain present - purulent fluid    R Lateral abdominal wall abscess s/p CT drainage  - Repeat CT \"Complex fluid and gas collection of the right lateral abdominal wall traversing the oblique musculature. \"      Plan:     Continue vancomycin and cefepime  F/u abscess fluid culture  Drain management per neurosurgery / IR  Monitor VS  PICC in place - IV abx on d/c     Discussed with pt  Nir Nice MS IV    Addendum to Medical Student Progress note:  Pt seen,examined and evaluated. I have independently performed history, physical exam, lab and data review. I have determined assessment and plan as documented by student ([address] [place] [address]). 80 yo woman with hx spinal stenosis, OA  Hx R l4/5 lumbar fusion 11/24 ([facility], Dr Kyler Patel).       Presents with [facility] with 1 week fever / R side pain. CT with 'large R complex ileopsoas abscess'.  At [place] - SARS CoV-2 PCR neg.    Transferred to [facility] and admit 12/7 -- T 99.6, WBC 8.4  12/8 - IR aspiration pus, placed drain.  GS 2+ GNR, no growth to date, anaerobic cult in process  12/10 - IR drain, R lateral abd collection, pus.  GS / cult sent      12/11 - pt reports less pain.       IMP/  Lumbar SSI - s/p aspiration / drain  R psoas abscess  R lateral abd wall / retroperitoneum abscess     REC/  Cont vancomycin + cefepime for now  Will f/u on Psoas abscess cult (GS with GNR) + R lateral abd collection cult  New IR drain - R abd wall  R psoas / abd drains per Surg / IR  F/u CT ordered     Anticipate long course of antibiotics (SSI with hardware)  Need cult result to determine choice of antibiotics     Discussed with pt, RN  Winter Delaney MD

## 2020-12-11 NOTE — PROGRESS NOTES
Surgery Daily Progress Note  Gomez Molina    CC:Concern for bowel perforation    Subjective :  No acute events overnight. Tolerating pain with tyleonl. Patient denies any N/V, passing flatus and is having small lose bowel movements. Tmax 100 yesterday; Afebrile overnight. hemodynamically stable. Objective    Infusions:     I/O:I/O last 3 completed shifts: In: 0   Out: 340 [Urine:300; Drains:40]           Wt Readings from Last 1 Encounters:   12/10/20 121 lb 11.1 oz (55.2 kg)                 LABS:   Recent Labs     12/10/20  0602 12/11/20  0449   WBC 9.1 9.9   HGB 9.7* 10.0*   HCT 28.6* 29.5*   MCV 90.6 88.9   * 502*        Recent Labs     12/10/20  0846 12/11/20  0449   * 135*   K 4.4 4.2   CL 98* 98*   CO2 30 29   PHOS 2.8  --    BUN 9 9   CREATININE <0.5* <0.5*      No results for input(s): AST, ALT, ALB, BILIDIR, BILITOT, ALKPHOS in the last 72 hours. No results for input(s): LIPASE, AMYLASE in the last 72 hours. Recent Labs     12/10/20  0847   INR 1.17*      No results for input(s): CKTOTAL, CKMB, CKMBINDEX, TROPONINI in the last 72 hours. Exam:  /71   Pulse 98   Temp 98.1 °F (36.7 °C) (Oral)   Resp 18   Ht 5' 5\" (1.651 m)   Wt 121 lb 11.1 oz (55.2 kg)   SpO2 95%   BMI 20.25 kg/m²     CONSTITUTIONAL:  awake, alert, cooperative, no apparent distress  HEENT: No palpable lymphadenopathy, no icterus  LUNGS: No increased work of breathing, clear to auscultation bilaterally, no crackles or wheezing  CV:  Regular rate and rhythm, normal S1 and S2, and no murmur noted  ABDOMEN: Soft, non-distended, RLQ to right flank tenderness, incision sites to right flank with erythema extending to R hip. perc drain x2 to R flank. ASSESSMENT/PLAN:   Pt. is a 79 y.o. female with Hx of L4-5 fusion on 11/24/20 presented with 1 week hx of fevers, abd pain, swelling. CT with right psoas abscess for which IR placed perc drain on 12/8 and aspirated 15cc hemo purulent fluid.        · Repeated CT scan yesterday to evaluate for second abscess cavity in the preperitoneal space yesterday; Second Perc drain place in RLQ. · Flush drains with 10cc sterile water BID  · continue diet and bowel regimen   · Continue abx per ID   · Continue PRN pain medications  · Encourage ambulation  · SW for drain care at home      Leodan Luu CNP  12/11/2020  7:00 AM  [phone]    I have seen, examined, and reviewed the patients chart. I agree with the residents assessment and have made appropriate changes.     Tequila Morgan

## 2020-12-11 NOTE — PROGRESS NOTES
Clinical Pharmacy Progress Note    Admit date: 12/7/2020     Subjective/Objective:  Pt is a 76yof with PMHx that includes arthritis who is s/p Right L4-L5 lumbar fusion surgery on 11/24/20. Pt presented with fevers and leg / abdominal swelling - found on imaging to have large complex right iliopsoas abscess. Interval update:   Afebrile overnight. Second drain placed yesterday by IR. Pharmacy is consulted to dose Vancomycin per Dr. Shannon Love and Priyanka Duong    Current antibiotics:  Cefepime 2g IV q8h - day #4  Vancomycin - Pharmacy to dose -day #4   750mg IV q12h (12/8 - 12/10)   750 mg IV q8h (12/11-current)      Recent Labs     12/10/20  0846 12/11/20  0449   * 135*   K 4.4 4.2   CL 98* 98*   CO2 30 29   BUN 9 9   CREATININE <0.5* <0.5*   GLUCOSE 102* 108*       CrCl cannot be calculated (This lab value cannot be used to calculate CrCl because it is not a number: <0.5). Lab Results   Component Value Date    WBC 9.9 12/11/2020    HGB 10.0 (L) 12/11/2020    HCT 29.5 (L) 12/11/2020    MCV 88.9 12/11/2020     (H) 12/11/2020       Lab Results   Component Value Date    PROTIME 13.6 (H) 12/10/2020    INR 1.17 (H) 12/10/2020       Height:  5' 5\" (165.1 cm)  Weight:  121 lb 11.1 oz (55.2 kg)    Vancomycin Levels:  Trough (12/10 @ 06:02) = 25.4 mcg/mL (drawn one-hour after dose hung)  Trough (12/10 @ 16:14) = 7.9 mcg/mL (drawn appropriately on 750 mg IV q12h)  Trough (12/12 @ 10:00) = ORDERED    Culture results:  Abscess fluid (12/8) = 2+ GNR, final ID pending    Prophylaxis:  VTE:  Enoxaparin 40 mg subQ daily  GI:  Not indicated      Assessment/Plan:  1)  Large complex right iliopsoas abscess s/p L4-L5 lumbar fusion:  Cefepime + Vancomycin - day #4  · Cefepime -   Current dose remains appropriate based on indication and current renal function. Will continue to monitor and adjust as needed per [facility] Renal Dose Adjustment Policy.   · Vancomycin - Pharmacy to dose  · Trough yesterday afternoon below goal at 7.9 mcg/mL and dose was increased to 750 mg IV q8h. Renal function is stable today. Will continue. · Repeat level ordered with 10:30 dose tomorrow AM. Goal ~15 mcg/mL. · Clinical condition will be monitored closely, and levels will be ordered as clinically indicated. Please call with questions.   Sarath Richards, PharmD, BCPS  Wireless: [id number]  Main pharmacy: [id number]  12/11/2020 7:31 AM

## 2020-12-11 NOTE — PROGRESS NOTES
Practitioner: Ronnell Leung MD  Subjective  Subjective: Pt was in bed willing to participate  Pain Screening  Patient Currently in Pain: Yes  Pain Assessment  Pain Level: 6  Pain Type: Acute pain  Pain Location: Back;Flank  Pain Orientation: Right; Lower  Vital Signs  Patient Currently in Pain: Yes       Orientation  Orientation  Overall Orientation Status: Within Functional Limits  Objective   Bed mobility  Supine to Sit: Minimal assistance  Scooting: Minimal assistance  Transfers  Sit to Stand: Stand by assistance  Stand to sit: Stand by assistance  Ambulation  Ambulation?: Yes  Ambulation 1  Surface: level tile  Device: Rolling Walker  Assistance: Stand by assistance  Quality of Gait: decreased heel strike on R, decreased step length on L, flexed knee on R, step to pattern on L, slow ho, all improve with cues  Distance: 150'     Balance  Sitting - Static: Good  Sitting - Dynamic: Good  Standing - Static: Fair  Standing - Dynamic: Fair;-  Exercises  Quad Sets: 15x5 sec  Heelslides: x15 B  Gluteal Sets: [id number] B     AM-PAC Score  AM-PAC Inpatient Mobility Raw Score : 19 (12/11/20 [facility])  AM-PAC Inpatient T-Scale Score : 45.44 (12/11/20 [facility])  Mobility Inpatient CMS 0-100% Score: 41.77 (12/11/20 [facility])  Mobility Inpatient CMS G-Code Modifier : CK (12/11/20 1433)          Goals  Short term goals  Time Frame for Short term goals: By discharge  Short term goal 1: Pt will be able to perform bed mobility with supervision  Short term goal 2: Pt will transfer with supervision and no AD  Short term goal 3: Pt will ambulate 150' with walker and supervision  Short term goal 4: Pt will go up/down 1 platform step with supervision  Patient Goals   Patient goals :  To go home and be able to get back to hiking    Plan    Plan  Times per week: 2-5  Times per day: Daily  Current Treatment Recommendations: Strengthening, Neuromuscular Re-education, Balance Training, Endurance Training, Functional Mobility Training, Transfer Training, Gait Training, Patient/Caregiver Education & Training, Safety Education & Training, Stair training  Safety Devices  Type of devices: Call light within reach, Left in chair     Therapy Time   Individual Concurrent Group Co-treatment   Time In 1355         Time Out 1418         Minutes 23         Timed Code Treatment Minutes: Matias 5, PTA

## 2020-12-11 NOTE — PROGRESS NOTES
Hospitalist Progress Note      PCP: Shelbie Akins MD    Date of Admission: 12/7/2020    Chief Complaint:     No chief complaint on file. Abdominal pain    Subjective:  Patient seen and examined at the bedside.    2nd drain placed w/ IR, cultures sent  Pain stable  No fevers overnight  Awaiting cultures for final ABx decision    PFHS: reviewed as documented 12/7/2020, no changes    Medications:  Reviewed    Infusion Medications   Scheduled Medications    polyethylene glycol  17 g Oral Daily    vancomycin  750 mg Intravenous Q8H    sodium chloride flush  10 mL Intravenous 2 times per day    enoxaparin  40 mg Subcutaneous Daily    docusate sodium  100 mg Oral BID    sodium chloride flush  10 mL Intravenous 2 times per day    cefepime  2 g Intravenous Q8H     PRN Meds: sodium chloride flush, sodium chloride flush, acetaminophen **OR** [DISCONTINUED] acetaminophen      Intake/Output Summary (Last 24 hours) at 12/10/2020 8057  Last data filed at 12/10/2020 2312  Gross per 24 hour   Intake 0 ml   Output 740 ml   Net -740 ml       Physical Exam    /63   Pulse 95   Temp 98.3 °F (36.8 °C) (Oral)   Resp 18   Ht 5' 5\" (1.651 m)   Wt 121 lb 11.1 oz (55.2 kg)   SpO2 95%   BMI 20.25 kg/m²     General appearance:  No acute distress, appears stated age  Eyes: Pupils equal, round, reactive to light, conjunctiva/corneas clear  Ears/Nose/Mouth/Throat: No external lesions or scars, hearing intact to voice  Neck: Trachea midline, no masses noted, no thyromegaly  Respiratory:  Non-labored breathing, clear to auscultation bilaterally  Cardiovascular: Regular rate and rhythm, no murmurs, gallops, or rubs  Abdomen: R flank pain, R drain x2 present  Musculoskeletal: Warm, well perfused, no cyanosis or edema  Skin: normal color, no wounds noted  Psychiatric: A&Ox4, good insight and judgment    Labs:   Recent Labs     12/08/20  0600 12/10/20  0602   WBC 8.4 9.1   HGB 10.4* 9.7*   HCT 30.6* 28.6*   * 454* Recent Labs     12/08/20  0600 12/10/20  0602 12/10/20  0846   * 133* 135*   K 3.8 3.7 4.4   CL 94* 98* 98*   CO2 29 29 30   BUN 14 9 9   CREATININE 0.5* <0.5* <0.5*   CALCIUM 9.5 9.3 9.8   PHOS 3.1  --  2.8     No results for input(s): AST, ALT, BILIDIR, BILITOT, ALKPHOS in the last 72 hours. Recent Labs     12/08/20  0600 12/10/20  0847   INR 1.12 1.17*     No results for input(s): Reyne Kosovan in the last 72 hours. Urinalysis:    No results found for: Venkata Parent, BACTERIA, RBCUA, BLOODU, SPECGRAV, Mickey [place] 994    Radiology:  CT ABDOMEN PELVIS W IV CONTRAST Additional Contrast? Oral   Final Result   Impression: Interval decrease in size of a right psoas abscess status post percutaneous drain placement. Complex fluid and gas collection of the right lateral abdominal wall traversing the oblique musculature. CT DRAINAGE HEMATOMA/SEROMA/FLUID COLLECTION   Final Result   Impression: Technically successful CT-guided 8 Kiswahili drain placement into a fluid and gas collection of the right lateral abdominal wall. CT DRAINAGE HEMATOMA/SEROMA/FLUID COLLECTION   Final Result   Impression: Technically successful CT-guided percutaneous drain placement into a postsurgical right psoas abscess.              Assessment/Plan:    Active Hospital Problems    Diagnosis Date Noted    Deep incisional surgical site infection [T81.42XA] 12/08/2020    Psoas abscess [facility]) [K68.12] 12/07/2020    S/P lumbar fusion [Z98.1] 11/24/2020       Plan:    # Right iliopsoas abscess s/p CT drainage x2  -2n drain palced 12/10  -cultures sent, data needed for final ABx recs  -Appreciate neurosurgery and ID following  -PICC in place 12/9, will need long course of ABx    # s/p R L4-L5 lumbar fusion  -neurosurgery following    DVT Prophylaxis: Lovenox  Diet: DIET GENERAL;  Code Status: Full Code    PT/OT Eval Status: Ongoing    Dispo: Julio Cesar Scott pending clinical improvement    Justine Hamilton MD

## 2020-12-12 LAB
ANION GAP SERPL CALCULATED.3IONS-SCNC: 7 MMOL/L (ref 3–16)
BANDED NEUTROPHILS RELATIVE PERCENT: 9 % (ref 0–7)
BASOPHILS ABSOLUTE: 0.1 K/UL (ref 0–0.2)
BASOPHILS RELATIVE PERCENT: 1 %
BODY FLUID CULTURE, STERILE: ABNORMAL
BUN BLDV-MCNC: 10 MG/DL (ref 7–20)
CALCIUM SERPL-MCNC: 9.6 MG/DL (ref 8.3–10.6)
CHLORIDE BLD-SCNC: 100 MMOL/L (ref 99–110)
CO2: 29 MMOL/L (ref 21–32)
CREAT SERPL-MCNC: <0.5 MG/DL (ref 0.6–1.2)
EOSINOPHILS ABSOLUTE: 0 K/UL (ref 0–0.6)
EOSINOPHILS RELATIVE PERCENT: 0 %
GFR AFRICAN AMERICAN: >60
GFR NON-AFRICAN AMERICAN: >60
GLUCOSE BLD-MCNC: 116 MG/DL (ref 70–99)
GRAM STAIN RESULT: ABNORMAL
HCT VFR BLD CALC: 26.7 % (ref 36–48)
HEMOGLOBIN: 9.2 G/DL (ref 12–16)
LYMPHOCYTES ABSOLUTE: 0.6 K/UL (ref 1–5.1)
LYMPHOCYTES RELATIVE PERCENT: 8 %
MCH RBC QN AUTO: 30.4 PG (ref 26–34)
MCHC RBC AUTO-ENTMCNC: 34.3 G/DL (ref 31–36)
MCV RBC AUTO: 88.5 FL (ref 80–100)
METAMYELOCYTES RELATIVE PERCENT: 4 %
MONOCYTES ABSOLUTE: 0.6 K/UL (ref 0–1.3)
MONOCYTES RELATIVE PERCENT: 7 %
NEUTROPHILS ABSOLUTE: 6.6 K/UL (ref 1.7–7.7)
NEUTROPHILS RELATIVE PERCENT: 71 %
ORGANISM: ABNORMAL
PDW BLD-RTO: 13.5 % (ref 12.4–15.4)
PLATELET # BLD: 446 K/UL (ref 135–450)
PMV BLD AUTO: 6.2 FL (ref 5–10.5)
POTASSIUM SERPL-SCNC: 4 MMOL/L (ref 3.5–5.1)
RBC # BLD: 3.02 M/UL (ref 4–5.2)
SODIUM BLD-SCNC: 136 MMOL/L (ref 136–145)
VANCOMYCIN TROUGH: 14.5 UG/ML (ref 10–20)
WBC # BLD: 7.9 K/UL (ref 4–11)

## 2020-12-12 PROCEDURE — 99233 SBSQ HOSP IP/OBS HIGH 50: CPT | Performed by: INTERNAL MEDICINE

## 2020-12-12 PROCEDURE — 99233 SBSQ HOSP IP/OBS HIGH 50: CPT | Performed by: SURGERY

## 2020-12-12 PROCEDURE — 6360000002 HC RX W HCPCS: Performed by: INTERNAL MEDICINE

## 2020-12-12 PROCEDURE — 2580000003 HC RX 258: Performed by: INTERNAL MEDICINE

## 2020-12-12 PROCEDURE — 6370000000 HC RX 637 (ALT 250 FOR IP): Performed by: INTERNAL MEDICINE

## 2020-12-12 PROCEDURE — 6360000002 HC RX W HCPCS: Performed by: STUDENT IN AN ORGANIZED HEALTH CARE EDUCATION/TRAINING PROGRAM

## 2020-12-12 PROCEDURE — 80048 BASIC METABOLIC PNL TOTAL CA: CPT

## 2020-12-12 PROCEDURE — 1200000000 HC SEMI PRIVATE

## 2020-12-12 PROCEDURE — 80202 ASSAY OF VANCOMYCIN: CPT

## 2020-12-12 PROCEDURE — 85025 COMPLETE CBC W/AUTO DIFF WBC: CPT

## 2020-12-12 PROCEDURE — 36592 COLLECT BLOOD FROM PICC: CPT

## 2020-12-12 PROCEDURE — 6370000000 HC RX 637 (ALT 250 FOR IP): Performed by: STUDENT IN AN ORGANIZED HEALTH CARE EDUCATION/TRAINING PROGRAM

## 2020-12-12 RX ADMIN — VANCOMYCIN HYDROCHLORIDE 750 MG: 10 INJECTION, POWDER, LYOPHILIZED, FOR SOLUTION INTRAVENOUS at 11:11

## 2020-12-12 RX ADMIN — DOCUSATE SODIUM 100 MG: 100 CAPSULE ORAL at 19:56

## 2020-12-12 RX ADMIN — Medication 10 ML: at 09:42

## 2020-12-12 RX ADMIN — CEFEPIME HYDROCHLORIDE 2 G: 2 INJECTION, POWDER, FOR SOLUTION INTRAVENOUS at 19:56

## 2020-12-12 RX ADMIN — VANCOMYCIN HYDROCHLORIDE 750 MG: 10 INJECTION, POWDER, LYOPHILIZED, FOR SOLUTION INTRAVENOUS at 04:00

## 2020-12-12 RX ADMIN — ENOXAPARIN SODIUM 40 MG: 40 INJECTION, SOLUTION INTRAVENOUS; SUBCUTANEOUS at 09:41

## 2020-12-12 RX ADMIN — POLYETHYLENE GLYCOL 3350 17 G: 17 POWDER, FOR SOLUTION ORAL at 09:42

## 2020-12-12 RX ADMIN — DOCUSATE SODIUM 100 MG: 100 CAPSULE ORAL at 09:41

## 2020-12-12 RX ADMIN — VANCOMYCIN HYDROCHLORIDE 750 MG: 10 INJECTION, POWDER, LYOPHILIZED, FOR SOLUTION INTRAVENOUS at 20:58

## 2020-12-12 RX ADMIN — CEFEPIME HYDROCHLORIDE 2 G: 2 INJECTION, POWDER, FOR SOLUTION INTRAVENOUS at 09:42

## 2020-12-12 ASSESSMENT — PAIN SCALES - GENERAL: PAINLEVEL_OUTOF10: 0

## 2020-12-12 NOTE — PROGRESS NOTES
Pt is alert and oriented. VSS with the exception of an elevated temp last night \"101.3\". Gave Tylenol, repeat assessment 98.4. Dressings to right hip x 2 drains C, D, & I. Malodorous smell. ice to back and hip as tolerated. All safety measures in place. Will continue to monitor.

## 2020-12-12 NOTE — PROGRESS NOTES
Hospitalist Progress Note      PCP: Breann Ruiz MD    Date of Admission: 12/7/2020    Chief Complaint:     No chief complaint on file. Abdominal pain    Subjective:  Patient seen and examined at the bedside. No complaints at this time.   Pain marginally better  Cultures growing Ecoli and Bacteriodes  Fever overnight    PFHS: reviewed as documented 12/7/2020, no changes    Medications:  Reviewed    Infusion Medications   Scheduled Medications    cefepime  2 g Intravenous Q12H    polyethylene glycol  17 g Oral Daily    vancomycin  750 mg Intravenous Q8H    sodium chloride flush  10 mL Intravenous 2 times per day    enoxaparin  40 mg Subcutaneous Daily    docusate sodium  100 mg Oral BID    sodium chloride flush  10 mL Intravenous 2 times per day     PRN Meds: sodium chloride flush, sodium chloride flush, acetaminophen **OR** [DISCONTINUED] acetaminophen      Intake/Output Summary (Last 24 hours) at 12/12/2020 0954  Last data filed at 12/12/2020 7432  Gross per 24 hour   Intake 480 ml   Output 120 ml   Net 360 ml       Physical Exam    BP 99/63   Pulse 97   Temp 97.5 °F (36.4 °C) (Oral)   Resp 16   Ht 5' 5\" (1.651 m)   Wt 121 lb 11.1 oz (55.2 kg)   SpO2 96%   BMI 20.25 kg/m²     General appearance:  No acute distress, appears stated age  Eyes: Pupils equal, round, reactive to light, conjunctiva/corneas clear  Ears/Nose/Mouth/Throat: No external lesions or scars, hearing intact to voice  Neck: Trachea midline, no masses noted, no thyromegaly  Respiratory:  Non-labored breathing, clear to auscultation bilaterally  Cardiovascular: Regular rate and rhythm, no murmurs, gallops, or rubs  Abdomen: R flank pain, R drain x2 present  Musculoskeletal: Warm, well perfused, no cyanosis or edema  Skin: normal color, no wounds noted  Psychiatric: A&Ox4, good insight and judgment    Labs:   Recent Labs     12/10/20  0602 12/11/20  0449 12/12/20  0645   WBC 9.1 9.9 7.9   HGB 9.7* 10.0* 9.2* Dispo: Inpt, dispo pending clinical improvement.      Messi Maravilla MD

## 2020-12-12 NOTE — PROGRESS NOTES
Clinical Pharmacy Progress Note    Admit date: 12/7/2020     Subjective/Objective:  Pt is a 76yof with PMHx that includes arthritis who is s/p Right L4-L5 lumbar fusion surgery on 11/24/20. Pt presented with fevers and leg / abdominal swelling - found on imaging to have large complex right iliopsoas abscess. Interval update: Febrile to 101.3F overnight, tachycardia now resolved. Cultures growing Bacteroides fragilis and pan sensitive E coli. Pharmacy is consulted to dose Vancomycin per Dr. Elaine Mayen and Susan Vaughan    Current antibiotics:  Cefepime 2g IV q8h - day #5  Vancomycin - Pharmacy to dose -day #5   750mg IV q12h (12/8 - 12/10)   750 mg IV q8h (12/11-current)    Recent Labs     12/11/20  0449 12/12/20  0645   * 136   K 4.2 4.0   CL 98* 100   CO2 29 29   BUN 9 10   CREATININE <0.5* <0.5*   GLUCOSE 108* 116*       CrCl cannot be calculated (This lab value cannot be used to calculate CrCl because it is not a number: <0.5). Lab Results   Component Value Date    WBC 7.9 12/12/2020    HGB 9.2 (L) 12/12/2020    HCT 26.7 (L) 12/12/2020    MCV 88.5 12/12/2020     12/12/2020       Lab Results   Component Value Date    PROTIME 13.6 (H) 12/10/2020    INR 1.17 (H) 12/10/2020       Height:  5' 5\" (165.1 cm)  Weight:  121 lb 11.1 oz (55.2 kg)    Vancomycin Levels:  Trough (12/10 @ 06:02) = 25.4 mcg/mL (drawn one-hour after dose hung)  Trough (12/10 @ 16:14) = 7.9 mcg/mL (drawn appropriately on 750 mg IV q12h)  Trough (12/12 @ 10:00) = 14.5 mcg/mL (drawn appropriately on 750 mg IV q8h)    Culture results:  Abscess fluid (12/8) = 2+ GNR, final ID pending    Prophylaxis:  VTE:  Enoxaparin 40 mg subQ daily  GI:  Not indicated    Assessment/Plan:  1)  Large complex right iliopsoas abscess s/p L4-L5 lumbar fusion:  Cefepime + Vancomycin - day #5  · Cefepime -   Current dose remains appropriate based on indication and current renal function. Will continue to monitor and adjust as needed per [facility] Renal Dose Adjustment Policy. · Vancomycin - Pharmacy to dose  · Trough today = 14.5 mcg/mL. Goal ~15 mcg/mL. Renal function is stable today. Will continue. · Clinical condition will be monitored closely, and levels will be ordered as clinically indicated. Please call with questions.   Damian Owen, PharmD  PGY1 Pharmacy Resident   Wireless: [phone]  12/12/2020 12:56 PM

## 2020-12-12 NOTE — PROGRESS NOTES
Surgery Daily Progress Note      CC: Right psoas abscess    SUBJECTIVE:  Febrile to 101.3F overnight, tachycardia now resolved. Bacteroides fragilis and E coli growing from 12/8 and 12/10 IR drains respectively. Continuing to have BMs; no nausea/vomiting, tolerating regular diet. States still having some R sided flank pain, espcially with drain flushing. ROS:   A 14 point review of systems was conducted, significant findings as noted above. All other systems negative. OBJECTIVE:    PHYSICAL EXAM:  Vitals:    12/11/20 1854 12/11/20 2230 12/12/20 0030 12/12/20 0400   BP: 99/61 128/80 111/67 111/67   Pulse: 120 102 79 83   Resp: 16 16 16 16   Temp: 99.1 °F (37.3 °C) 101.3 °F (38.5 °C) 98.4 °F (36.9 °C) 98 °F (36.7 °C)   TempSrc: Oral Oral Oral Oral   SpO2: 97% 97% 97% 97%   Weight:       Height:           General appearance: Alert, no acute distress, well-developed, well-nourished  HEENT: Normocephalic, extraocular movements grossly intact, moist mucous membranes  Chest/Lungs: normal inspiratory effort, symmetric chest rise, no accessory muscle use  Cardiovascular: Regular rate and rhythm  Abdomen: Soft, non-distended, non-tender to palpation throughout, no guarding/rebound ; R flank tenderness with 2 percutaneous drains, erythema mildly improved from yesterday  Neuro: A&Ox3, no gross motor or sensory neuro deficits  Extremities: no edema, no cyanosis        ASSESSMENT & PLAN:   Eden Burch is a 79 y.o. female with history of L4-5 fusion on 11/24/20 presented with 1 week hx of fevers, abd pain, swelling. CT with right psoas abscess s/p IR drain placement 12/8, 12/10.    - Continue to flush drains bid with 10cc NS  - Continue diet, bowel regimen  - Antibiotics per ID, continue Vancomycin and Cefepime for B fragilis and E coli  - Continue to monitor for fevers; if continuing, may need further imaging and drain studies.  - Discussed with NSG team; will continue to follow closely. Latoya Oliveira MD, PGY-1  12/12/20  7:19 AM  [phone]    I have personally performed the medical history, physical exam and medical decision making and agree with all pertinent clinical information unless otherwise noted.      Erythema and edema along right lateral lower abdominal wall  Will closely follow to identify early for any aggressive soft tissue infection that necessitate debridement  Drain care    Rita Cruz MD  Surgery Attending

## 2020-12-12 NOTE — PROGRESS NOTES
Patient is alert and oriented. Hip drain is draining, back drain has had no output so far this shift. Ambulated without difficulty in the halls, x1 with walker. Denies nausea/vomiting. Tolerating diet well. VSS, BP soft. Denies pain medication at this time. Voiding adequately.

## 2020-12-12 NOTE — PLAN OF CARE
Problem: Pain:  Goal: Pain level will decrease  Description: Pain level will decrease. Patient denies pain medication at this time, resting comfortably in chair. 12/12/2020 0919 by Wendy Cooley RN  Outcome: Ongoing       Problem: Falls - Risk of:  Goal: Will remain free from falls  Description: Fall precautions in place. Bed is in lowest position, wheels locked, alarm on, non-skid socks on. Call light and bedside table within reach. Patient calls out appropriately. Patient is up x1 assist with a walker. Will continue to assess and monitor.     12/12/2020 0919 by Wendy Cooley RN  Outcome: Ongoing

## 2020-12-13 LAB
ALBUMIN SERPL-MCNC: 2.9 G/DL (ref 3.4–5)
ANAEROBIC CULTURE: ABNORMAL
ANAEROBIC CULTURE: NORMAL
ANION GAP SERPL CALCULATED.3IONS-SCNC: 7 MMOL/L (ref 3–16)
BASOPHILS ABSOLUTE: 0 K/UL (ref 0–0.2)
BASOPHILS RELATIVE PERCENT: 0 %
BUN BLDV-MCNC: 12 MG/DL (ref 7–20)
CALCIUM SERPL-MCNC: 9.6 MG/DL (ref 8.3–10.6)
CHLORIDE BLD-SCNC: 97 MMOL/L (ref 99–110)
CO2: 29 MMOL/L (ref 21–32)
CREAT SERPL-MCNC: <0.5 MG/DL (ref 0.6–1.2)
CULTURE SURGICAL: ABNORMAL
EOSINOPHILS ABSOLUTE: 0.2 K/UL (ref 0–0.6)
EOSINOPHILS RELATIVE PERCENT: 2 %
GFR AFRICAN AMERICAN: >60
GFR NON-AFRICAN AMERICAN: >60
GLUCOSE BLD-MCNC: 120 MG/DL (ref 70–99)
GRAM STAIN RESULT: ABNORMAL
HCT VFR BLD CALC: 28.3 % (ref 36–48)
HEMOGLOBIN: 9.5 G/DL (ref 12–16)
HYPERCHROMASIA: ABNORMAL
HYPOCHROMIA: ABNORMAL
LYMPHOCYTES ABSOLUTE: 1.7 K/UL (ref 1–5.1)
LYMPHOCYTES RELATIVE PERCENT: 21 %
MAGNESIUM: 2.1 MG/DL (ref 1.8–2.4)
MCH RBC QN AUTO: 29.9 PG (ref 26–34)
MCHC RBC AUTO-ENTMCNC: 33.6 G/DL (ref 31–36)
MCV RBC AUTO: 89 FL (ref 80–100)
METAMYELOCYTES RELATIVE PERCENT: 3 %
MONOCYTES ABSOLUTE: 0.2 K/UL (ref 0–1.3)
MONOCYTES RELATIVE PERCENT: 2 %
MYELOCYTE PERCENT: 5 %
NEUTROPHILS ABSOLUTE: 5.9 K/UL (ref 1.7–7.7)
NEUTROPHILS RELATIVE PERCENT: 67 %
ORGANISM: ABNORMAL
PDW BLD-RTO: 13.2 % (ref 12.4–15.4)
PHOSPHORUS: 2.8 MG/DL (ref 2.5–4.9)
PLATELET # BLD: 475 K/UL (ref 135–450)
PMV BLD AUTO: 6.2 FL (ref 5–10.5)
POTASSIUM SERPL-SCNC: 3.9 MMOL/L (ref 3.5–5.1)
RBC # BLD: 3.18 M/UL (ref 4–5.2)
SCHISTOCYTES: ABNORMAL
SODIUM BLD-SCNC: 133 MMOL/L (ref 136–145)
WBC # BLD: 7.9 K/UL (ref 4–11)

## 2020-12-13 PROCEDURE — 1200000000 HC SEMI PRIVATE

## 2020-12-13 PROCEDURE — 85025 COMPLETE CBC W/AUTO DIFF WBC: CPT

## 2020-12-13 PROCEDURE — 2580000003 HC RX 258: Performed by: INTERNAL MEDICINE

## 2020-12-13 PROCEDURE — 6370000000 HC RX 637 (ALT 250 FOR IP): Performed by: STUDENT IN AN ORGANIZED HEALTH CARE EDUCATION/TRAINING PROGRAM

## 2020-12-13 PROCEDURE — 2500000003 HC RX 250 WO HCPCS: Performed by: SURGERY

## 2020-12-13 PROCEDURE — 2580000003 HC RX 258: Performed by: STUDENT IN AN ORGANIZED HEALTH CARE EDUCATION/TRAINING PROGRAM

## 2020-12-13 PROCEDURE — 6370000000 HC RX 637 (ALT 250 FOR IP): Performed by: INTERNAL MEDICINE

## 2020-12-13 PROCEDURE — 83735 ASSAY OF MAGNESIUM: CPT

## 2020-12-13 PROCEDURE — 6370000000 HC RX 637 (ALT 250 FOR IP): Performed by: SURGERY

## 2020-12-13 PROCEDURE — 6360000002 HC RX W HCPCS: Performed by: STUDENT IN AN ORGANIZED HEALTH CARE EDUCATION/TRAINING PROGRAM

## 2020-12-13 PROCEDURE — 2580000003 HC RX 258: Performed by: SURGERY

## 2020-12-13 PROCEDURE — 80069 RENAL FUNCTION PANEL: CPT

## 2020-12-13 PROCEDURE — 6360000002 HC RX W HCPCS: Performed by: INTERNAL MEDICINE

## 2020-12-13 PROCEDURE — 99232 SBSQ HOSP IP/OBS MODERATE 35: CPT | Performed by: SURGERY

## 2020-12-13 RX ORDER — POTASSIUM CHLORIDE 750 MG/1
10 TABLET, EXTENDED RELEASE ORAL ONCE
Status: COMPLETED | OUTPATIENT
Start: 2020-12-13 | End: 2020-12-13

## 2020-12-13 RX ADMIN — POTASSIUM CHLORIDE 10 MEQ: 750 TABLET, EXTENDED RELEASE ORAL at 08:24

## 2020-12-13 RX ADMIN — DIBASIC SODIUM PHOSPHATE, MONOBASIC POTASSIUM PHOSPHATE AND MONOBASIC SODIUM PHOSPHATE 1 TABLET: 852; 155; 130 TABLET ORAL at 08:20

## 2020-12-13 RX ADMIN — SODIUM PHOSPHATE, MONOBASIC, MONOHYDRATE 30 MMOL: 276; 142 INJECTION, SOLUTION INTRAVENOUS at 10:21

## 2020-12-13 RX ADMIN — DOCUSATE SODIUM 100 MG: 100 CAPSULE ORAL at 08:03

## 2020-12-13 RX ADMIN — ENOXAPARIN SODIUM 40 MG: 40 INJECTION, SOLUTION INTRAVENOUS; SUBCUTANEOUS at 08:04

## 2020-12-13 RX ADMIN — SODIUM CHLORIDE 1.5 G: 900 INJECTION INTRAVENOUS at 20:05

## 2020-12-13 RX ADMIN — POLYETHYLENE GLYCOL 3350 17 G: 17 POWDER, FOR SOLUTION ORAL at 08:03

## 2020-12-13 RX ADMIN — Medication 10 ML: at 08:04

## 2020-12-13 RX ADMIN — VANCOMYCIN HYDROCHLORIDE 750 MG: 10 INJECTION, POWDER, LYOPHILIZED, FOR SOLUTION INTRAVENOUS at 04:47

## 2020-12-13 RX ADMIN — Medication 10 ML: at 08:05

## 2020-12-13 RX ADMIN — DOCUSATE SODIUM 100 MG: 100 CAPSULE ORAL at 20:05

## 2020-12-13 RX ADMIN — SODIUM CHLORIDE 1.5 G: 900 INJECTION INTRAVENOUS at 08:20

## 2020-12-13 ASSESSMENT — PAIN SCALES - GENERAL: PAINLEVEL_OUTOF10: 0

## 2020-12-13 NOTE — PROGRESS NOTES
ID Follow-up NOTE    CC:   Lumbar SSI, R psoas abscess, retroperitoneal abscess  Antibiotics: Vancomycin, Cefepime    Admit Date: 12/7/2020  Hospital Day: 6    Subjective:     Pt c/o ongoing R sided abdominal/hip pain    Objective:     Patient Vitals for the past 8 hrs:   BP Temp Temp src Pulse Resp SpO2   12/12/20 1908 111/63 98.9 °F (37.2 °C) Oral 97 16 99 %   12/12/20 1532 114/68 98.9 °F (37.2 °C) Oral 107 16 97 %     I/O last 3 completed shifts: In: 240 [P.O.:240]  Out: 125 [Drains:125]  I/O this shift:  In: 240 [P.O.:240]  Out: 20 [Drains:20]    EXAM:  GENERAL: No apparent distress.     HEENT: Membranes moist, no oral lesion  NECK:  Supple, no lymphadenopathy  LUNGS: Clear b/l, no rales, no dullness  CARDIAC: RRR, no murmur appreciated  ABD:  + BS, soft / NT  EXT:  No rash, no edema, no lesions - Paraspinal and RLQ drains in place - tender to palpation in RLQ  NEURO: No focal neurologic findings  PSYCH: Orientation, sensorium, mood normal  LINES:  Peripheral iv , PICC in L arm       Data Review:  Lab Results   Component Value Date    WBC 7.9 12/12/2020    HGB 9.2 (L) 12/12/2020    HCT 26.7 (L) 12/12/2020    MCV 88.5 12/12/2020     12/12/2020     Lab Results   Component Value Date    CREATININE <0.5 (L) 12/12/2020    BUN 10 12/12/2020     12/12/2020    K 4.0 12/12/2020     12/12/2020    CO2 29 12/12/2020       Hepatic Function Panel:   Lab Results   Component Value Date    LABALBU 2.9 12/10/2020       MICRO:  12/8 Body fluid / abscess GS : 4+ WBC (PMN), 4+ GNR; cult heavy E coli  Escherichia coli (1)  Antibiotic Interpretation DIALLO   ampicillin Sensitive <=2 mcg/mL   ceFAZolin Sensitive <=4 mcg/mL   cefepime Sensitive <=0.12 mcg/mL   cefTRIAXone Sensitive <=0.25 mcg/mL   ciprofloxacin Sensitive <=0.25 mcg/mL   ertapenem Sensitive <=0.12 mcg/mL   gentamicin Sensitive <=1 mcg/mL   levofloxacin Sensitive <=0.12 mcg/mL   piperacillin-tazobactam Sensitive <=4 mcg/mL trimethoprim-sulfamethoxazole Sensitive <=20 mcg/mL      Surgical cult - GS 4+WBC, 2+GNR; cult - heavy B fragilis     Imagin/10 CT drainage:  Impression: Technically successful CT-guided 8 Amharic drain placement into a fluid and gas collection of the right lateral abdominal wall. 12/10 Abd / Pelvic CT:  Impression:   Interval decrease in size of a right psoas abscess status post percutaneous drain placement. Complex fluid and gas collection of the right lateral abdominal wall traversing the oblique musculature     CT abdomen/pelvis - [facility]  MUSCULOSKELETAL: Nate Hawk is ectopic gas identified along the right lateral abdominal wall musculature. There is a elliptical fluid collection in the right lateral abdominal wall with air-fluid level. The collection measures approximately 8.5 x 2.4 cm in greatest transaxial dimensions. There is a complex rim-enhancing fluid collection in the right ileal psoas muscle consistent with an abscess. The collection measures approximately 4.1 x 2.6 cm on transaxial dimensions and up to 8.3 cm in craniocaudad dimension. There are surgical changes of the lumbosacral spine with posterior surgical fusion at L4-5 with intervertebral disc prosthesis. Ectopic gas pockets identified at the disc space at L4-5 that may be postsurgical in nature. There is high dense material extending from the right iliopsoas region to the posterior lateral aspect of the disc space that may be related to extruded material from the surgical prosthesis. Ectopic gas also identified extending into the epicardial region of the   anterior lower chest and right posterior retroperitoneum. 1. Large complex right iliopsoas abscess as described above. 2. Fluid collection with air-fluid level associated with large amount of ectopic gas along the right lateral abdominal wall.     12/10 CT Abd/pelvis Impression: Interval decrease in size of a right psoas abscess status post percutaneous drain placement. Complex fluid and gas collection of the right lateral abdominal wall traversing the oblique musculature. Scheduled Meds:   cefepime  2 g Intravenous Q12H    polyethylene glycol  17 g Oral Daily    vancomycin  750 mg Intravenous Q8H    sodium chloride flush  10 mL Intravenous 2 times per day    enoxaparin  40 mg Subcutaneous Daily    docusate sodium  100 mg Oral BID    sodium chloride flush  10 mL Intravenous 2 times per day       Continuous Infusions:      PRN Meds:  sodium chloride flush, sodium chloride flush, acetaminophen **OR** [DISCONTINUED] acetaminophen      Assessment:     78 yo woman with hx spinal stenosis, OA  Hx R l4/5 lumbar fusion 11/24 ([address], Dr Zach Mullins).       Presents with [facility] with 1 week fever / R side pain. CT with 'large R complex ileopsoas abscess'.  At [place] - SARS CoV-2 PCR neg.    Transferred to [facility] and admit 12/7 -- T 99.6, WBC 8.4  12/8 - IR aspiration pus, placed drain.  GS 2+ GNR, no growth to date, anaerobic cult in process  12/10 - IR drain, R lateral abd collection, pus.  GS / cult sent      12/11 - pt reports less pain.       IMP/  Lumbar SSI - s/p aspiration / drain  R psoas abscess  R lateral abd wall / retroperitoneum abscess    Plan:     Change to unasyn  R psoas / abd drains per Surg / IR  F/u CT ordered     Anticipate long course of antibiotics (SSI with hardware)  See ROBY - will change to invanz after discharge (if insurance coverage issue, can use ceftriaxone + metronidazole)     Discussed with pt  Karli Rosenthal MD    INFUSION ORDERS:  Invanz 1 gm iv daily through 1/20/21  - First dose given in hospital  - PICC   - Disposition / date discharge  - Check CBC w diff, CMP, ESR, CRP every Mon or Tue - FAX result to [phone]  - Call with antibiotic / infusion issues, [phone]  - No f/u in outpatient ID office necessary

## 2020-12-13 NOTE — PROGRESS NOTES
Unable to infuse Unaysn d/t single access currently being used for sodium phosphate. Per Dr. Onur Oseguera, skipping 1330 dose and continuing with q6 schedule.

## 2020-12-13 NOTE — PLAN OF CARE
Falls - Risk of:  Goal: Absence of physical injury  Outcome: Ongoing  Note: Mrs. Baljinder Calhoun has 2 drains and a PICC line. She walks SBA. She uses the call light appropriately. I will continue to monitor. Infection - Surgical Site:  Goal: Will show no infection signs and symptoms  Outcome: Ongoing  Note: Temp is stable. No signs of fever present at this time.

## 2020-12-13 NOTE — PROGRESS NOTES
0619   WBC 9.9 7.9 7.9   HGB 10.0* 9.2* 9.5*   HCT 29.5* 26.7* 28.3*   * 446 475*     Recent Labs     12/11/20  0449 12/12/20  0645 12/13/20  0619   * 136 133*   K 4.2 4.0 3.9   CL 98* 100 97*   CO2 29 29 29   BUN 9 10 12   CREATININE <0.5* <0.5* <0.5*   CALCIUM 9.8 9.6 9.6   PHOS  --   --  2.8     No results for input(s): AST, ALT, BILIDIR, BILITOT, ALKPHOS in the last 72 hours. No results for input(s): INR in the last 72 hours. No results for input(s): Bo Clap in the last 72 hours. Urinalysis:    No results found for: Cameron Amy, BACTERIA, RBCUA, BLOODU, SPECGRAV, Mickey [place] 994    Radiology:  CT ABDOMEN PELVIS W IV CONTRAST Additional Contrast? Oral   Final Result   Impression: Interval decrease in size of a right psoas abscess status post percutaneous drain placement. Complex fluid and gas collection of the right lateral abdominal wall traversing the oblique musculature. CT DRAINAGE HEMATOMA/SEROMA/FLUID COLLECTION   Final Result   Impression: Technically successful CT-guided 8 Pakistani drain placement into a fluid and gas collection of the right lateral abdominal wall. CT DRAINAGE HEMATOMA/SEROMA/FLUID COLLECTION   Final Result   Impression: Technically successful CT-guided percutaneous drain placement into a postsurgical right psoas abscess.              Assessment/Plan:    Active Hospital Problems    Diagnosis Date Noted    Deep incisional surgical site infection [T81.42XA] 12/08/2020    Psoas abscess [facility]) [K68.12] 12/07/2020    S/P lumbar fusion [Z98.1] 11/24/2020       Plan:    # Right iliopsoas abscess s/p CT drainage x2  -2nd drain placed 12/10  -culture growing Ecoli and bacteroides  -Appreciate surgery and ID recs  -Final ABx will be Unasyn per ID  -PICC in place 12/9, anticipate long course of ABx     # s/p R L4-L5 lumbar fusion  -neurosurgery following    DVT Prophylaxis: Lovenox  Diet: DIET GENERAL;  Code Status: Full Code    PT/OT Eval Status: Complete Dispo: Inpt, dispo pending clinical improvement, possibly tomorrow    Miley Bowden MD

## 2020-12-13 NOTE — PROGRESS NOTES
I have personally performed the medical history, physical exam and medical decision making and agree with all pertinent clinical information unless otherwise noted.      Looking more alert  Erythema not expanding    Nelson Chanel MD  Surgery Attending

## 2020-12-13 NOTE — PROGRESS NOTES
No acute events overnight. VSS. Pt is A/O x4 Pt stayed afebrile. Pt denies pain. Perc drains flushed by the medical residents and are intact. Dressings are CDI. Bed alarm on, wheels locked, bed in lowest position, side rails up 2/4, nonskid socks on, call light and bedside table in reach. Will continue to monitor.

## 2020-12-14 VITALS
WEIGHT: 115.74 LBS | RESPIRATION RATE: 16 BRPM | BODY MASS INDEX: 19.28 KG/M2 | SYSTOLIC BLOOD PRESSURE: 98 MMHG | HEART RATE: 95 BPM | OXYGEN SATURATION: 96 % | HEIGHT: 65 IN | DIASTOLIC BLOOD PRESSURE: 56 MMHG | TEMPERATURE: 99.1 F

## 2020-12-14 LAB
ALBUMIN SERPL-MCNC: 2.9 G/DL (ref 3.4–5)
ANION GAP SERPL CALCULATED.3IONS-SCNC: 7 MMOL/L (ref 3–16)
BASOPHILS ABSOLUTE: 0 K/UL (ref 0–0.2)
BASOPHILS RELATIVE PERCENT: 0 %
BUN BLDV-MCNC: 10 MG/DL (ref 7–20)
CALCIUM SERPL-MCNC: 9.8 MG/DL (ref 8.3–10.6)
CHLORIDE BLD-SCNC: 99 MMOL/L (ref 99–110)
CO2: 31 MMOL/L (ref 21–32)
CREAT SERPL-MCNC: <0.5 MG/DL (ref 0.6–1.2)
EOSINOPHILS ABSOLUTE: 0.3 K/UL (ref 0–0.6)
EOSINOPHILS RELATIVE PERCENT: 3 %
GFR AFRICAN AMERICAN: >60
GFR NON-AFRICAN AMERICAN: >60
GLUCOSE BLD-MCNC: 90 MG/DL (ref 70–99)
HCT VFR BLD CALC: 27.2 % (ref 36–48)
HEMOGLOBIN: 9.2 G/DL (ref 12–16)
LYMPHOCYTES ABSOLUTE: 1.7 K/UL (ref 1–5.1)
LYMPHOCYTES RELATIVE PERCENT: 20 %
MAGNESIUM: 2.2 MG/DL (ref 1.8–2.4)
MCH RBC QN AUTO: 30.2 PG (ref 26–34)
MCHC RBC AUTO-ENTMCNC: 33.7 G/DL (ref 31–36)
MCV RBC AUTO: 89.4 FL (ref 80–100)
METAMYELOCYTES RELATIVE PERCENT: 1 %
MONOCYTES ABSOLUTE: 0.5 K/UL (ref 0–1.3)
MONOCYTES RELATIVE PERCENT: 6 %
NEUTROPHILS ABSOLUTE: 6.1 K/UL (ref 1.7–7.7)
NEUTROPHILS RELATIVE PERCENT: 70 %
PDW BLD-RTO: 13.4 % (ref 12.4–15.4)
PHOSPHORUS: 3.2 MG/DL (ref 2.5–4.9)
PLATELET # BLD: 476 K/UL (ref 135–450)
PMV BLD AUTO: 6.4 FL (ref 5–10.5)
POTASSIUM SERPL-SCNC: 4.1 MMOL/L (ref 3.5–5.1)
RBC # BLD: 3.05 M/UL (ref 4–5.2)
RBC # BLD: NORMAL 10*6/UL
SODIUM BLD-SCNC: 137 MMOL/L (ref 136–145)
WBC # BLD: 8.6 K/UL (ref 4–11)

## 2020-12-14 PROCEDURE — 97530 THERAPEUTIC ACTIVITIES: CPT

## 2020-12-14 PROCEDURE — 6360000002 HC RX W HCPCS: Performed by: STUDENT IN AN ORGANIZED HEALTH CARE EDUCATION/TRAINING PROGRAM

## 2020-12-14 PROCEDURE — 6360000002 HC RX W HCPCS: Performed by: INTERNAL MEDICINE

## 2020-12-14 PROCEDURE — 80069 RENAL FUNCTION PANEL: CPT

## 2020-12-14 PROCEDURE — 85025 COMPLETE CBC W/AUTO DIFF WBC: CPT

## 2020-12-14 PROCEDURE — 2580000003 HC RX 258: Performed by: STUDENT IN AN ORGANIZED HEALTH CARE EDUCATION/TRAINING PROGRAM

## 2020-12-14 PROCEDURE — 83735 ASSAY OF MAGNESIUM: CPT

## 2020-12-14 PROCEDURE — 6370000000 HC RX 637 (ALT 250 FOR IP): Performed by: STUDENT IN AN ORGANIZED HEALTH CARE EDUCATION/TRAINING PROGRAM

## 2020-12-14 PROCEDURE — 2580000003 HC RX 258: Performed by: INTERNAL MEDICINE

## 2020-12-14 PROCEDURE — 6370000000 HC RX 637 (ALT 250 FOR IP): Performed by: INTERNAL MEDICINE

## 2020-12-14 RX ADMIN — ENOXAPARIN SODIUM 40 MG: 40 INJECTION, SOLUTION INTRAVENOUS; SUBCUTANEOUS at 08:13

## 2020-12-14 RX ADMIN — Medication 10 ML: at 08:14

## 2020-12-14 RX ADMIN — SODIUM CHLORIDE 1.5 G: 900 INJECTION INTRAVENOUS at 06:54

## 2020-12-14 RX ADMIN — DOCUSATE SODIUM 100 MG: 100 CAPSULE ORAL at 08:13

## 2020-12-14 RX ADMIN — POLYETHYLENE GLYCOL 3350 17 G: 17 POWDER, FOR SOLUTION ORAL at 08:13

## 2020-12-14 RX ADMIN — SODIUM CHLORIDE 1.5 G: 900 INJECTION INTRAVENOUS at 02:27

## 2020-12-14 RX ADMIN — ERTAPENEM SODIUM 1000 MG: 1 INJECTION, POWDER, LYOPHILIZED, FOR SOLUTION INTRAMUSCULAR; INTRAVENOUS at 17:30

## 2020-12-14 RX ADMIN — Medication 10 ML: at 10:40

## 2020-12-14 ASSESSMENT — PAIN SCALES - GENERAL: PAINLEVEL_OUTOF10: 0

## 2020-12-14 NOTE — PROGRESS NOTES
No acute events overnight. Perc drains x2 irrigated at 2200 and 0430. Drainage remains purulent. Pt declines pain medication. Pt using ice and repositioning for pain control. Pt ambulates with SBA with RW and tolerates well. Pt voiding adequately per HAT. Bed alarm on, wheels locked, bed in lowest position, side rails up 2/4, nonskid socks on, call light and bedside table in reach. Will continue to monitor.

## 2020-12-14 NOTE — PROGRESS NOTES
Additional Pertinent Hx: Patient is a 79 y.o. female s/p RIGHT L4-5 LATERAL LUMBAR INTERBODY FUSION WITH PEDICLE SCREW FIXATION by Dr. Andie Aburto on 11/24/2020 now w/ large complex right iliopsoas abscess. Diagnosis: Psoas Abscess  Treatment Diagnosis: impaired ADLs/transfers 2* abd/R hip region pain    Subjective:   Pt met seated in recliner chair and agreeable to OT treatment      Pain:   Denies  Objective:    Cognition/Orientation:  WFL    Scooting: Supervision for scooting forward and back in chair    Functional Mobility   Sit to Stand: SBA  Stand to Sit: SBA   Chair Transfer: SBA to and from recliner chair  Commode Transfer: pt reporting use of GB and no assist  Other: Functional mobility demonstrated with RW and CGA to SBA in room for item retrieval and 3 laps around unit and to phone charging station for charging phone. ADLs   LB dressing: Pt donning pants with CGA and good understanding of precautions using figure 4 tech.          Activity Tolerance:  Pt with good tolerance for all tasks    Patient Education:   Safe home set up, precautions - pt verb understanding    Safety Devices in Place:  Pt left in chair with alarm on and call light in reach                                 Plan   If pt discharges prior to next treatment, this note will serve as discharge summary  Plan  Times per week: 2-5  Times per day: Daily  Current Treatment Recommendations: Functional Mobility Training, Endurance Training, Safety Education & Training, Self-Care / ADL, Patient/Caregiver Education & Training  G-Code     OutComes Score                                                  AM-PAC Score        AM-[facility] Inpatient Daily Activity Raw Score: 21 (12/14/20 1532)  AM-PAC Inpatient ADL T-Scale Score : 44.27 (12/14/20 1532)  ADL Inpatient CMS 0-100% Score: 32.79 (12/14/20 1532)  ADL Inpatient CMS G-Code Modifier : Anh Oneal (12/14/20 1532)    Goals (as determined and assessed by primary OT)  Short term goals

## 2020-12-14 NOTE — PROGRESS NOTES
Bedside report done with [facility]. Pt in the chair and denies needs. Pt is A/O x4 and VSS. Perc drains x2 intact and draining purulent drainage. Pt denies needs at this time. Bed alarm on, wheels locked, bed in lowest position, side rails up 2/4, nonskid socks on, call light and bedside table in reach. Will continue to monitor.

## 2020-12-14 NOTE — CARE COORDINATION
[facility]    Patient aware and agreeable to services. Faxed orders to [facility].     Amber Conway LPN  Care Transition Nurse  [facility]  [phone]

## 2020-12-14 NOTE — PROGRESS NOTES
Physician Progress Note      Farhad Patiño  CSN #:                  [id number]  :                       1950  ADMIT DATE:       2020 8:30 PM  [address] [place] DATE:  RESPONDING  PROVIDER #:        GISSEL Duarte NP          QUERY TEXT:    Pt admitted with psoas abscess. Pt noted to have combined fusion . If   possible, please document in progress notes and discharge summary:    The medical record reflects the following:  Risk Factors: 78 yo S/P combined fusion . Clinical Indicators: Per H&P: Psoas abscess s/p L4-L5 lumbar fusion. Recent   surgery with incision on right side. Per Neuro: LATERAL LUMBAR INTERBODY   FUSION WITH PEDICLE SCREW FIXATION by Dr. Yani Cooley on 2020 now w/ large   complex right iliopsoas abscess. Treatment: ID consult, Percutaneous drain placement, IV vanc, fluid cultures  Options provided:  -- Psoas abscess as an postoperative complication  -- Psoas abscess related to other incidental risk factor (please specify)   occurring following surgery and not a complication, Please document incidental   risk factor. -- Other - I will add my own diagnosis  -- Disagree - Not applicable / Not valid  -- Disagree - Clinically unable to determine / Unknown  -- Refer to Clinical Documentation Reviewer    PROVIDER RESPONSE TEXT:    Patient has psoas abscess as a postoperative complication.     Query created by: Toyin Rodríguez on 12/10/2020 5:39 AM      Electronically signed by:  Kelvin Duarte NP 2020 7:15 AM

## 2020-12-14 NOTE — PROGRESS NOTES
Surgery Daily Progress Note      CC: Right psoas abscess    SUBJECTIVE:  HDS overnight. Afebrile with Tmax of 99.2. Making appropriate urine. ROS:   A 14 point review of systems was conducted, significant findings as noted above. OBJECTIVE:    PHYSICAL EXAM:  Vitals:    12/13/20 1845 12/13/20 2230 12/14/20 0215 12/14/20 0445   BP: 126/69 134/75 132/73    Pulse: 92 88 87    Resp: 16 16 16    Temp: 98.8 °F (37.1 °C) 98.9 °F (37.2 °C) 99.2 °F (37.3 °C)    TempSrc: Oral Oral Oral    SpO2: 98% 98% 96%    Weight:    115 lb 11.9 oz (52.5 kg)   Height:           General appearance: Alert, no acute distress, well-developed, well-nourished  HEENT: Normocephalic, extraocular movements grossly intact, moist mucous membranes  Chest/Lungs: normal inspiratory effort, symmetric chest rise, no accessory muscle use  Cardiovascular: Regular rate and rhythm  Abdomen: Soft, non-distended, non-tender to palpation throughout, no guarding/rebound ; R flank tenderness with 2 percutaneous drains purulent anteriorly, serous in the posterior, erythema mildly improved from yesterday  Neuro: A&Ox3, no gross motor or sensory neuro deficits  Extremities: no edema, no cyanosis        ASSESSMENT & PLAN:   Sofia Basurto is a 79 y.o. female with history of L4-5 fusion on 11/24/20 presented with 1 week hx of fevers, abd pain, swelling. CT with right psoas abscess s/p IR drain placement 12/8, 12/10.    - Flush drains BID. Will continue to monitor drain output. Will consider repeating CT when drain output minimized. Can likely be done in an outpatient setting.   - Drains growing bacteroides and e-coli. - Continue diet, bowel regimen. - Antibiotic coverage with Unasyn.      Latha Martinez MD  General Surgery, PGY1  12/14/20  7:09 AM  [phone]

## 2020-12-14 NOTE — CARE COORDINATION
5. Patients can then  the prescription on their way out of the hospital at discharge, or pharmacy can deliver to the bedside if staff is available. (payment due at time of pick-up or delivery - cash, check, or card accepted)     Able to afford home medications/ co-pay costs: Yes    ADLS:  Current PT AM-PAC Score: 19 /24  Current OT AM-PAC Score: 18 /24      DISCHARGE Disposition: Home with [address]: [facility]     LOC at discharge: Not Applicable  ROBY Completed: Yes    Notification completed in HENS/PAS?:  Not Applicable    IMM Completed:   Yes, Case management has presented and reviewed IMM letter #2 to the patient and/or family/ POA. Patient and/or family/POA verbalized understanding of their medicare rights and appeal process if needed. Patient and/or family/POA has signed, initialed and placed today's date (12/14/2020) and time (1130am) on IMM letter #2 on the the appropriate lines. Patient and/or family/POA, copy of letter offered and they are aware that this original copy of IMM letter #2 is available prior to discharge from the paper chart on the unit. Electronic documentation has been entered into epic for IMM letter #2 and original paper copy has been added to the paper chart at the nurses station.      Transportation:  Transportation PLAN for discharge: family   Mode of Transport: Private Car  Reason for medical transport: Not Applicable  Name of [address] [place] [address], [address]: Not Applicable  Time of Transport: when available    Transport form completed: Not Indicated    Home Care:  [address] ordered at discharge: Yes  [address]: [facility] JOSE LUIS  Phone: [phone]  Fax: [phone]  Orders faxed: Yes    Durable Medical Equipment:  DME Provider: NA  Equipment obtained during hospitalization: NA    Home Oxygen and Respiratory Equipment:  Oxygen needed at discharge?: Not [address]: Not Applicable  Portable tank available for discharge?: Not Indicated    Dialysis: Dialysis patient: No    Dialysis Center:  Not Applicable    Hospice Services:  Location: Not Applicable  Agency: Not Applicable    Consents signed: Not Indicated    Referrals made at [facility] for outpatient continued care:  Not Applicable    Additional CM Notes:   CM spoke to patient at bedside. Patient agreeable with plans for discharge. Patient will be followed by [facility] and [facility] for [facility] therapy. [facility] notified of patients planned d/c for today. Patient denied any questions or concerns in regards to discharge plans. RN messaging Dr Ping Lemus related to [employer]. Patient will need first dose in hospital.  She does not currently have on MAR. CM also spoke with [place]/[facility]. Notified of discharge. Nitin Arroyo will call patient to review insurance coverage. The Plan for Transition of Care is related to the following treatment goals of Psoas abscess [facility]) [K68.12]  Psoas abscess ([facility] [K68.12]    The Patient and/or patient representative Karime Toscano and her family were provided with a choice of provider and agrees with the discharge plan Yes    Freedom of choice list was provided with basic dialogue that supports the patient's individualized plan of care/goals and shares the quality data associated with the providers.  Yes    Care Transitions patient: No    Mustapha Sommer  Case Management Department  Ph: [phone]  Fax: [phone]

## 2020-12-14 NOTE — PROGRESS NOTES
NEUROSURGERY PROGRESS NOTE    Jolie Crockett  [id number]   1950 12/14/2020    Subjective: Patient sitting up in bed upon entering the room. No acute events overnight. Patient continues to c/o right side flank pain, but patient looks overall in better spirits. Objective:  BP (!) 156/76   Pulse 91   Temp 98.2 °F (36.8 °C) (Oral)   Resp 16   Ht 5' 5\" (1.651 m)   Wt 115 lb 11.9 oz (52.5 kg)   SpO2 98%   BMI 19.26 kg/m²     Physical Exam:   Patient seen and examined  GCS:  4 - Opens eyes on own  5 - Alert and oriented  6 - Follows simple motor commands  General: Well developed. Alert and cooperative in no acute distress. HENT: atraumatic, neck supple  Eyes: Optic discs: Not tested  Pulmonary: unlabored respiratory effort  Cardiovascular:  Warm well perfused. No peripheral edema  Gastrointestinal: abdomen soft, NT, ND    Neurological:  Mental Status: Awake, alert, oriented x 4, speech clear and appropriate  Attention: Intact  Language: No aphasia or dysarthria noted  Sensation: Intact to all extremities to light touch  Coordination: Intact    Musculoskeletal:   Gait: Not tested   Assist devices: None   Tone: Normal  Motor strength:    Right  Left    Right  Left    Deltoid  5 5  Hip Flex  5 5   Biceps  5 5  Knee Extensors  5 5   Triceps  5 5  Knee Flexors  5 5   Wrist Ext  5 5  Ankle Dorsiflex. 5 5   Wrist Flex  5 5  Ankle Plantarflex. 5 5   Handgrip  5 5  Ext Narendra Longus  5 5   Thumb Ext  5 5         Incisions: CDI    Drains:  Back- 45 mL in past 24 hours of purulent drainage  Hip- 55 mL in past 24 hours of purulent drainage    Radiological Findings:  CT Abd/Pelvis w contrast  OSH  Result Date: 12/7/2020  1. Large complex right iliopsoas abscess as described above. 2. Fluid collection with air-fluid level associated with large amount of ectopic gas along the right lateral abdominal wall.     Labs:  Recent Labs     12/14/20  0655   WBC 8.6   HGB 9.2*   HCT 27.2*   *       Recent Labs 12/14/20  0655      K 4.1   CL 99   CO2 31   BUN 10   CREATININE <0.5*   GLUCOSE 90   CALCIUM 9.8   PHOS 3.2   MG 2.20       No results for input(s): PROTIME, INR, APTT in the last 72 hours. Patient Active Problem List    Diagnosis Date Noted    Deep incisional surgical site infection 12/08/2020    Psoas abscess ([address] 12/07/2020    Spondylolisthesis of lumbar region 11/24/2020    S/P lumbar fusion 11/24/2020    Knee pain, left 09/30/2015    Lateral meniscus tear 09/30/2015       Assessment:  Patient is a 79 y.o. female s/p RIGHT L4-5 LATERAL LUMBAR INTERBODY FUSION WITH PEDICLE SCREW FIXATION by Dr. Zach Mullins on 11/24/2020 now w/ large complex right iliopsoas abscess. Plan:  1. Neurologic exams frequency: Q4H  2. For change in exam MUST contact neurosurgery team along with critical care or primary team  3. Drains growing bacteroides and e-coli. 4. General Surgery following and managing drains  5. ID following for abx management  6. Pain control: Managed by medical team  7. PT/OT following, appreciate recs  8. ADAT  9. Case Management assisting with discharge needs  10. Will follow inpatient. Please call with any questions or decline in neurological status    DISPO: OK to DC home from neurosurgery standpoint. Dispo timing to be determined by primary team once patient is medically stable for discharge. Patient was discussed with Dr. Zach Mullins who agrees with above assessment and plan.      Electronically signed by: CHADWICK Mcguire, 12/14/2020 8:50 AM  [phone]

## 2020-12-14 NOTE — PLAN OF CARE
Falls - Risk of:  Goal: Will remain free from falls  Outcome: Ongoing  Note: Mrs. Lizabeth Puckett uses the call light appropriately. Beside table within reach, socks on, call light within reach. Infection - Central Venous Catheter-Associated Bloodstream Infection:  Goal: Will show no infection signs and symptoms  Outcome: Ongoing  Note: Temperature has been stable. No signs of fever or chills/aches.

## 2020-12-14 NOTE — DISCHARGE SUMMARY
Hospital Medicine Discharge Summary    Patient ID: Jose Dougherty      Patient's PCP: Isamar Wagoner MD    Admit Date: 12/7/2020     Discharge Date: 12/14/2020    Admitting Physician: Dai Chan MD     Discharge Physician: Jerri Hill MD     Discharge Diagnoses: Active Hospital Problems    Diagnosis Date Noted    Deep incisional surgical site infection [T81.42XA] 12/08/2020    Psoas abscess [facility]) [K68.12] 12/07/2020    S/P lumbar fusion [Z98.1] 11/24/2020     See plan in progress note from this date for full hospital problem list.    The patient was seen and examined on day of discharge and this discharge summary is in conjunction with any daily progress note from day of discharge. Hospital Course:    Please see admission H&P as well as progress note from this date. Physical Exam Performed:     /62   Pulse 84   Temp 98.3 °F (36.8 °C) (Oral)   Resp 16   Ht 5' 5\" (1.651 m)   Wt 115 lb 11.9 oz (52.5 kg)   SpO2 99%   BMI 19.26 kg/m²     Please see progress note from this date    Labs: For convenience and continuity at follow-up the following most recent labs are provided:      CBC:    Lab Results   Component Value Date    WBC 8.6 12/14/2020    HGB 9.2 12/14/2020    HCT 27.2 12/14/2020     12/14/2020       Renal:    Lab Results   Component Value Date     12/14/2020    K 4.1 12/14/2020    CL 99 12/14/2020    CO2 31 12/14/2020    BUN 10 12/14/2020    CREATININE <0.5 12/14/2020    CALCIUM 9.8 12/14/2020    PHOS 3.2 12/14/2020         Significant Diagnostic Studies    Radiology:   CT ABDOMEN PELVIS W IV CONTRAST Additional Contrast? Oral   Final Result   Impression: Interval decrease in size of a right psoas abscess status post percutaneous drain placement. Complex fluid and gas collection of the right lateral abdominal wall traversing the oblique musculature.       CT DRAINAGE HEMATOMA/SEROMA/FLUID COLLECTION   Final Result Impression: Technically successful CT-guided 8 [place] drain placement into a fluid and gas collection of the right lateral abdominal wall. CT DRAINAGE HEMATOMA/SEROMA/FLUID COLLECTION   Final Result   Impression: Technically successful CT-guided percutaneous drain placement into a postsurgical right psoas abscess. Consults:     IP CONSULT TO NEUROSURGERY  PHARMACY TO DOSE VANCOMYCIN  IP CONSULT TO INFECTIOUS DISEASES  IP CONSULT TO GENERAL SURGERY  IP CONSULT TO HOME CARE NEEDS  PHARMACY TO DOSE VANCOMYCIN  IP CONSULT TO HOME CARE NEEDS    Disposition:  Home w/ home care    Condition at Discharge: Stable    Discharge Instructions/Follow-up:  Follow up with PCP in 1 week for hospital follow-up and to review any pending labs/tests. Please follow other discharge instructions as outlined in the discharge instructions    Code Status:  Full Code    Activity: activity as tolerated    Diet: DIET GENERAL;    Discharge Medications:     Current Discharge Medication List           Details   sennosides-docusate sodium (SENOKOT-S) 8.6-50 MG tablet Take 2 tablets by mouth 2 times daily  Qty:        polyethylene glycol (GLYCOLAX) 17 g packet Take 17 g by mouth daily  Qty: 527 g, Refills: 1      Multiple Vitamin (MULTIVITAMIN PO) Take by mouth      Calcium Carbonate-Vitamin D 300-100 MG-UNIT CAPS Take 1 tablet by mouth 2 times daily              Time Spent on discharge is 35 minutes in the examination, evaluation, counseling and review of medications and discharge plan. Signed:    Janet Kenney MD   12/14/2020      Thank you Dennie Murray, MD for the opportunity to be involved in this patient's care. If you have any questions or concerns please feel free to contact me at [phone].

## 2020-12-14 NOTE — PROGRESS NOTES
Recent Labs     12/12/20  0645 12/13/20  0619 12/14/20  0655    133* 137   K 4.0 3.9 4.1    97* 99   CO2 29 29 31   BUN 10 12 10   CREATININE <0.5* <0.5* <0.5*   CALCIUM 9.6 9.6 9.8   PHOS  --  2.8 3.2     No results for input(s): AST, ALT, BILIDIR, BILITOT, ALKPHOS in the last 72 hours. No results for input(s): INR in the last 72 hours. No results for input(s): [place] in the last 72 hours. Urinalysis:    No results found for: Lang Arceh, BACTERIA, RBCUA, BLOODU, SPECGRAV, [address]    Radiology:  CT ABDOMEN PELVIS W IV CONTRAST Additional Contrast? Oral   Final Result   Impression: Interval decrease in size of a right psoas abscess status post percutaneous drain placement. Complex fluid and gas collection of the right lateral abdominal wall traversing the oblique musculature. CT DRAINAGE HEMATOMA/SEROMA/FLUID COLLECTION   Final Result   Impression: Technically successful CT-guided 8 [place] drain placement into a fluid and gas collection of the right lateral abdominal wall. CT DRAINAGE HEMATOMA/SEROMA/FLUID COLLECTION   Final Result   Impression: Technically successful CT-guided percutaneous drain placement into a postsurgical right psoas abscess.              Assessment/Plan:    Active Hospital Problems    Diagnosis Date Noted    Deep incisional surgical site infection [T81.42XA] 12/08/2020    Psoas abscess ([address] [K68.12] 12/07/2020    S/P lumbar fusion [Z98.1] 11/24/2020       Plan:    # Right iliopsoas abscess s/p CT drainage x2  -2nd drain placed 12/10  -culture growing Ecoli and bacteroides  -Appreciate surgery and ID recs  -Final ABx will be Unasyn per ID  -PICC in place 12/9, anticipate long course of ABx     # s/p R L4-L5 lumbar fusion  -neurosurgery following    DVT Prophylaxis: Lovenox  Diet: DIET GENERAL;  Code Status: Full Code    PT/OT Eval Status: Complete    Dispo: likely dc today    Lulu Elizabeth MD

## 2020-12-14 NOTE — PLAN OF CARE
Problem: Pain:  Goal: Pain level will decrease  Description: Pain level will decrease  Outcome: Ongoing  Note: Pt has not required pain medication so far this shift. Pt uses ice and repositioning for pain control. Will continue to rate pain with the 0-10 pain rating scale. Problem: Falls - Risk of:  Goal: Will remain free from falls  Description: Will remain free from falls  Outcome: Ongoing  Note: Pt will remain free of falls for the duration of the shift. Pt is A/O x4  and uses the call light appropriately for needs. Pt is SBA with RW and tolerates well. Bed alarm on, wheels locked, bed in lowest position, side rails up 2/4, nonskid socks on, call light and bedside table in reach. Will continue to monitor. Problem: Infection - Surgical Site:  Goal: Will show no infection signs and symptoms  Description: Will show no infection signs and symptoms  12/14/2020 0101 by Xavi Rivera RN  Outcome: Ongoing  Note: No s/s of infection at the PICC site. Dressing changed this shift. 12/13/2020 1511 by Raymond Palacios RN  Outcome: Ongoing  Note: Temp is stable. No signs of fever present at this time.

## 2020-12-17 ENCOUNTER — CARE COORDINATION (OUTPATIENT)
Dept: CASE MANAGEMENT | Age: 70
End: 2020-12-17

## 2020-12-18 ENCOUNTER — HOSPITAL ENCOUNTER (OUTPATIENT)
Age: 70
Setting detail: SPECIMEN
Discharge: HOME | End: 2020-12-18
Payer: MEDICARE

## 2020-12-18 ENCOUNTER — TELEPHONE (OUTPATIENT)
Dept: SURGERY | Age: 70
End: 2020-12-18

## 2020-12-18 LAB
A/G RATIO: 1.2 (ref 1.1–2.2)
ALBUMIN SERPL-MCNC: 3.6 G/DL (ref 3.4–5)
ALP BLD-CCNC: 145 U/L (ref 40–129)
ALT SERPL-CCNC: 89 U/L (ref 10–40)
ANION GAP SERPL CALCULATED.3IONS-SCNC: 7 MMOL/L (ref 3–16)
AST SERPL-CCNC: 35 U/L (ref 15–37)
BANDED NEUTROPHILS RELATIVE PERCENT: 2 % (ref 0–7)
BASOPHILS ABSOLUTE: 0.1 K/UL (ref 0–0.2)
BASOPHILS RELATIVE PERCENT: 1 %
BILIRUB SERPL-MCNC: <0.2 MG/DL (ref 0–1)
BUN BLDV-MCNC: 20 MG/DL (ref 7–20)
C-REACTIVE PROTEIN: 18.3 MG/L (ref 0–5.1)
CALCIUM SERPL-MCNC: 10.4 MG/DL (ref 8.3–10.6)
CHLORIDE BLD-SCNC: 95 MMOL/L (ref 99–110)
CO2: 33 MMOL/L (ref 21–32)
CREAT SERPL-MCNC: 0.5 MG/DL (ref 0.6–1.2)
EOSINOPHILS ABSOLUTE: 0 K/UL (ref 0–0.6)
EOSINOPHILS RELATIVE PERCENT: 0 %
GFR AFRICAN AMERICAN: >60
GFR NON-AFRICAN AMERICAN: >60
GLOBULIN: 3.1 G/DL
GLUCOSE BLD-MCNC: 104 MG/DL (ref 70–99)
HCT VFR BLD CALC: 28.8 % (ref 36–48)
HEMOGLOBIN: 9.6 G/DL (ref 12–16)
LYMPHOCYTES ABSOLUTE: 1 K/UL (ref 1–5.1)
LYMPHOCYTES RELATIVE PERCENT: 13 %
MCH RBC QN AUTO: 29.9 PG (ref 26–34)
MCHC RBC AUTO-ENTMCNC: 33.4 G/DL (ref 31–36)
MCV RBC AUTO: 89.6 FL (ref 80–100)
MONOCYTES ABSOLUTE: 0.2 K/UL (ref 0–1.3)
MONOCYTES RELATIVE PERCENT: 2 %
NEUTROPHILS ABSOLUTE: 6.6 K/UL (ref 1.7–7.7)
NEUTROPHILS RELATIVE PERCENT: 82 %
PDW BLD-RTO: 14.2 % (ref 12.4–15.4)
PLATELET # BLD: 400 K/UL (ref 135–450)
PLATELET SLIDE REVIEW: ADEQUATE
PMV BLD AUTO: 6.1 FL (ref 5–10.5)
POTASSIUM SERPL-SCNC: 4.4 MMOL/L (ref 3.5–5.1)
RBC # BLD: 3.21 M/UL (ref 4–5.2)
RBC # BLD: NORMAL 10*6/UL
SEDIMENTATION RATE, ERYTHROCYTE: 38 MM/HR (ref 0–30)
SLIDE REVIEW: ABNORMAL
SODIUM BLD-SCNC: 135 MMOL/L (ref 136–145)
TOTAL PROTEIN: 6.7 G/DL (ref 6.4–8.2)
WBC # BLD: 7.9 K/UL (ref 4–11)

## 2020-12-18 PROCEDURE — 36415 COLL VENOUS BLD VENIPUNCTURE: CPT

## 2020-12-18 PROCEDURE — 85025 COMPLETE CBC W/AUTO DIFF WBC: CPT

## 2020-12-18 PROCEDURE — 85652 RBC SED RATE AUTOMATED: CPT

## 2020-12-18 PROCEDURE — 86140 C-REACTIVE PROTEIN: CPT

## 2020-12-18 PROCEDURE — 80053 COMPREHEN METABOLIC PANEL: CPT

## 2020-12-18 NOTE — TELEPHONE ENCOUNTER
Julee Hahn @ [employer] [facility] [employer]. Drain right flank when flushed gauze becomes saturated. Pt had appointment to follow up on 12/21/2020. Did Dr. Kristin Gutierrez put her drains? Possible on 12/07/2020. Please call.

## 2020-12-21 ENCOUNTER — OFFICE VISIT (OUTPATIENT)
Dept: SURGERY | Age: 70
End: 2020-12-21
Payer: MEDICARE

## 2020-12-21 VITALS
DIASTOLIC BLOOD PRESSURE: 80 MMHG | TEMPERATURE: 97.1 F | WEIGHT: 115.8 LBS | RESPIRATION RATE: 16 BRPM | BODY MASS INDEX: 19.29 KG/M2 | HEART RATE: 98 BPM | HEIGHT: 65 IN | SYSTOLIC BLOOD PRESSURE: 131 MMHG

## 2020-12-21 DIAGNOSIS — K68.11 POSTPROCEDURAL RETROPERITONEAL ABSCESS: Primary | ICD-10-CM

## 2020-12-21 LAB
ALBUMIN SERPL-MCNC: 3.3 G/DL (ref 3.5–5.7)
ALP BLD-CCNC: 106 IU/L (ref 35–135)
ALT SERPL-CCNC: 45 IU/L (ref 10–60)
ANION GAP SERPL CALCULATED.3IONS-SCNC: 3 MMOL/L (ref 6–18)
AST SERPL-CCNC: 18 IU/L (ref 10–40)
BASOPHILS ABSOLUTE: 0.1 THOU/MCL (ref 0–0.2)
BASOPHILS ABSOLUTE: 1 %
BILIRUB SERPL-MCNC: 0.3 MG/DL (ref 0–1.2)
BUN BLDV-MCNC: 18 MG/DL (ref 8–26)
C-REACTIVE PROTEIN WIDE RANGE: 12 MG/L
CALCIUM SERPL-MCNC: 9.7 MG/DL (ref 8.5–10.4)
CHLORIDE BLD-SCNC: 101 MEQ/L (ref 98–111)
CO2: 34 MMOL/L (ref 21–31)
CREAT SERPL-MCNC: 0.51 MG/DL (ref 0.6–1.2)
EOSINOPHILS ABSOLUTE: 0.2 THOU/MCL (ref 0.03–0.45)
EOSINOPHILS RELATIVE PERCENT: 3 %
GFR AFRICAN AMERICAN: 110 ML/MIN/1.73 M2
GFR NON-AFRICAN AMERICAN: 95 ML/MIN/1.73 M2
GLUCOSE BLD-MCNC: 98 MG/DL (ref 70–99)
HCT VFR BLD CALC: 31 % (ref 36–46)
HEMOGLOBIN: 10.2 G/DL (ref 12–15.2)
LYMPHOCYTES ABSOLUTE: 1.7 THOU/MCL (ref 1–4)
LYMPHOCYTES RELATIVE PERCENT: 21 %
MCH RBC QN AUTO: 29.7 PG (ref 27–33)
MCHC RBC AUTO-ENTMCNC: 33 G/DL (ref 32–36)
MCV RBC AUTO: 90 FL (ref 82–97)
MONOCYTES # BLD: 8 %
MONOCYTES ABSOLUTE: 0.6 THOU/MCL (ref 0.2–0.9)
NEUTROPHILS ABSOLUTE: 5.5 THOU/MCL (ref 1.8–7.7)
PDW BLD-RTO: 14.4 % (ref 12.3–17)
PLATELET # BLD: 401 THOU/MCL (ref 140–375)
PMV BLD AUTO: 6.6 FL (ref 7.4–11.5)
POTASSIUM SERPL-SCNC: 4.5 MEQ/L (ref 3.6–5.1)
RBC # BLD: 3.44 MIL/MCL (ref 3.8–5.2)
SEDIMENTATION RATE, ERYTHROCYTE: 33 MM/HR (ref 0–30)
SEG NEUTROPHILS: 67 %
SODIUM BLD-SCNC: 138 MEQ/L (ref 135–145)
TOTAL PROTEIN: 6.2 G/DL (ref 6–8)
WBC # BLD: 8.1 THOU/MCL (ref 3.6–10.5)

## 2020-12-21 PROCEDURE — 1036F TOBACCO NON-USER: CPT | Performed by: SURGERY

## 2020-12-21 PROCEDURE — G8400 PT W/DXA NO RESULTS DOC: HCPCS | Performed by: SURGERY

## 2020-12-21 PROCEDURE — 4040F PNEUMOC VAC/ADMIN/RCVD: CPT | Performed by: SURGERY

## 2020-12-21 PROCEDURE — 99213 OFFICE O/P EST LOW 20 MIN: CPT | Performed by: SURGERY

## 2020-12-21 PROCEDURE — 1123F ACP DISCUSS/DSCN MKR DOCD: CPT | Performed by: SURGERY

## 2020-12-21 PROCEDURE — 3017F COLORECTAL CA SCREEN DOC REV: CPT | Performed by: SURGERY

## 2020-12-21 PROCEDURE — G8420 CALC BMI NORM PARAMETERS: HCPCS | Performed by: SURGERY

## 2020-12-21 PROCEDURE — G8427 DOCREV CUR MEDS BY ELIG CLIN: HCPCS | Performed by: SURGERY

## 2020-12-21 PROCEDURE — G8484 FLU IMMUNIZE NO ADMIN: HCPCS | Performed by: SURGERY

## 2020-12-21 PROCEDURE — 1111F DSCHRG MED/CURRENT MED MERGE: CPT | Performed by: SURGERY

## 2020-12-21 PROCEDURE — 1090F PRES/ABSN URINE INCON ASSESS: CPT | Performed by: SURGERY

## 2020-12-28 LAB
ALBUMIN SERPL-MCNC: 3.6 G/DL (ref 3.5–5.7)
ALP BLD-CCNC: 96 IU/L (ref 35–135)
ALT SERPL-CCNC: 21 IU/L (ref 10–60)
ANION GAP SERPL CALCULATED.3IONS-SCNC: 8 MMOL/L (ref 6–18)
AST SERPL-CCNC: 18 IU/L (ref 10–40)
BASOPHILS ABSOLUTE: 0 THOU/MCL (ref 0–0.2)
BASOPHILS ABSOLUTE: 1 %
BILIRUB SERPL-MCNC: 0.4 MG/DL (ref 0–1.2)
BUN BLDV-MCNC: 24 MG/DL (ref 8–26)
C-REACTIVE PROTEIN WIDE RANGE: 7 MG/L
CALCIUM SERPL-MCNC: 9.8 MG/DL (ref 8.5–10.4)
CHLORIDE BLD-SCNC: 101 MEQ/L (ref 98–111)
CO2: 30 MMOL/L (ref 21–31)
CREAT SERPL-MCNC: 0.53 MG/DL (ref 0.6–1.2)
EOSINOPHILS ABSOLUTE: 0.2 THOU/MCL (ref 0.03–0.45)
EOSINOPHILS RELATIVE PERCENT: 3 %
GFR AFRICAN AMERICAN: 109 ML/MIN/1.73 M2
GFR NON-AFRICAN AMERICAN: 94 ML/MIN/1.73 M2
GLUCOSE BLD-MCNC: 114 MG/DL (ref 70–99)
HCT VFR BLD CALC: 31.5 % (ref 36–46)
HEMOGLOBIN: 10.4 G/DL (ref 12–15.2)
LYMPHOCYTES ABSOLUTE: 2.1 THOU/MCL (ref 1–4)
LYMPHOCYTES RELATIVE PERCENT: 32 %
MCH RBC QN AUTO: 29.7 PG (ref 27–33)
MCHC RBC AUTO-ENTMCNC: 32.9 G/DL (ref 32–36)
MCV RBC AUTO: 90.4 FL (ref 82–97)
MONOCYTES # BLD: 9 %
MONOCYTES ABSOLUTE: 0.6 THOU/MCL (ref 0.2–0.9)
NEUTROPHILS ABSOLUTE: 3.7 THOU/MCL (ref 1.8–7.7)
PDW BLD-RTO: 15.5 % (ref 12.3–17)
PLATELET # BLD: 328 THOU/MCL (ref 140–375)
PMV BLD AUTO: 7.2 FL (ref 7.4–11.5)
POTASSIUM SERPL-SCNC: 4.6 MEQ/L (ref 3.6–5.1)
RBC # BLD: 3.48 MIL/MCL (ref 3.8–5.2)
SEDIMENTATION RATE, ERYTHROCYTE: 32 MM/HR (ref 0–30)
SEG NEUTROPHILS: 55 %
SODIUM BLD-SCNC: 139 MEQ/L (ref 135–145)
TOTAL PROTEIN: 6.7 G/DL (ref 6–8)
WBC # BLD: 6.6 THOU/MCL (ref 3.6–10.5)

## 2020-12-29 ENCOUNTER — CARE COORDINATION (OUTPATIENT)
Dept: CASE MANAGEMENT | Age: 70
End: 2020-12-29

## 2020-12-29 NOTE — CARE COORDINATION
[facility] Transitions Follow Up Call    2020    Patient: Herminia Toscano  Patient : 1950   MRN: <[id number]>  Reason for Admission:   Discharge Date: 20 RARS: Readmission Risk Score: 9         Spoke with: Jass Parrish, patient    Care Transitions Subsequent and Final Call    Subsequent and Final Calls  Have your medications changed?: No  Do you have any questions related to your medications?: No  Do you currently have any active services?: Yes  Are you currently active with any services?: Home Health  Do you have any needs or concerns that I can assist you with?: No  Identified Barriers: None  Care Transitions Interventions  Other Interventions: Follow Up:  Contacted patient for BPCI-A follow up. Antony Currie stated she is making small improvements. Reports her appetite has returned. No c/o fever, chills, nausea/vomiting. Reports drains were removed a week ago. Stated she is having \"some\" pain where drains were. Home health nurse continues to make visits to change PICC line dressing and draws blood every Monday. She continue to receive IV antibiotic until 2021. Patient is doing exercises from the spine book and tolerating well. She is aware of when to contact provider with any new or worsening symptoms. No questions or concerns at this time.       Edgardo Brady RN

## 2021-01-02 ENCOUNTER — TELEPHONE (OUTPATIENT)
Dept: INFECTIOUS DISEASES | Age: 71
End: 2021-01-02

## 2021-01-02 NOTE — TELEPHONE ENCOUNTER
Called pt --    Lumbar SSI, R psoas abscess, lateral abd swelling  Pt reports she feel better  Lumbar drain removed   PICC in place, getting iv invanz

## 2021-01-04 LAB
ALBUMIN SERPL-MCNC: 3.8 G/DL (ref 3.5–5.7)
ALP BLD-CCNC: 87 IU/L (ref 35–135)
ALT SERPL-CCNC: 19 IU/L (ref 10–60)
ANION GAP SERPL CALCULATED.3IONS-SCNC: 6 MMOL/L (ref 6–18)
AST SERPL-CCNC: 18 IU/L (ref 10–40)
BASOPHILS ABSOLUTE: 0 THOU/MCL (ref 0–0.2)
BASOPHILS ABSOLUTE: 1 %
BILIRUB SERPL-MCNC: 0.4 MG/DL (ref 0–1.2)
BUN BLDV-MCNC: 23 MG/DL (ref 8–26)
C-REACTIVE PROTEIN WIDE RANGE: <5 MG/L
CALCIUM SERPL-MCNC: 9.9 MG/DL (ref 8.5–10.4)
CHLORIDE BLD-SCNC: 102 MEQ/L (ref 98–111)
CO2: 31 MMOL/L (ref 21–31)
CREAT SERPL-MCNC: 0.52 MG/DL (ref 0.6–1.2)
EOSINOPHILS ABSOLUTE: 0.1 THOU/MCL (ref 0.03–0.45)
EOSINOPHILS RELATIVE PERCENT: 2 %
GFR AFRICAN AMERICAN: 109 ML/MIN/1.73 M2
GFR NON-AFRICAN AMERICAN: 95 ML/MIN/1.73 M2
GLUCOSE BLD-MCNC: 112 MG/DL (ref 70–99)
HCT VFR BLD CALC: 35.3 % (ref 36–46)
HEMOGLOBIN: 11.6 G/DL (ref 12–15.2)
LYMPHOCYTES ABSOLUTE: 1.8 THOU/MCL (ref 1–4)
LYMPHOCYTES RELATIVE PERCENT: 27 %
MCH RBC QN AUTO: 29.6 PG (ref 27–33)
MCHC RBC AUTO-ENTMCNC: 32.8 G/DL (ref 32–36)
MCV RBC AUTO: 90.2 FL (ref 82–97)
MONOCYTES # BLD: 8 %
MONOCYTES ABSOLUTE: 0.6 THOU/MCL (ref 0.2–0.9)
NEUTROPHILS ABSOLUTE: 4.1 THOU/MCL (ref 1.8–7.7)
PDW BLD-RTO: 14.7 % (ref 12.3–17)
PLATELET # BLD: 296 THOU/MCL (ref 140–375)
PMV BLD AUTO: 7.1 FL (ref 7.4–11.5)
POTASSIUM SERPL-SCNC: 4.3 MEQ/L (ref 3.6–5.1)
RBC # BLD: 3.91 MIL/MCL (ref 3.8–5.2)
SEDIMENTATION RATE, ERYTHROCYTE: 25 MM/HR (ref 0–30)
SEG NEUTROPHILS: 62 %
SODIUM BLD-SCNC: 139 MEQ/L (ref 135–145)
TOTAL PROTEIN: 7 G/DL (ref 6–8)
WBC # BLD: 6.6 THOU/MCL (ref 3.6–10.5)

## 2021-01-05 NOTE — PROGRESS NOTES
PATIENT NAME: Moe Vizcarra     YOB: 1950     TODAY'S DATE: 1/5/2021    Reason for Visit:  Retroperitoneal abscess    Requesting Physician:  Dr. Elodia Hernandez:              The patient is a 79 y.o. female with a PMHx as delineated below who presents following a recent hospitalization for retroperitoneal abscess following back surgery. She had these areas drained and was discharged on antibiotics. She has noted less than 3 cc per day from each of the drains over the past 4 days. She denies pain, fevers, and notes resolution of the erythema over the right flank.      Chief Complaint   Patient presents with    Other     hospital admit follow up, abscess/drains       REVIEW OF SYSTEMS:  CONSTITUTIONAL:  negative  HEENT:  negative  RESPIRATORY:  negative  CARDIOVASCULAR:  negative  GASTROINTESTINAL:  negative  GENITOURINARY:  negative  HEMATOLOGIC/LYMPHATIC:  negative  MUSCULOSKELETAL: negative  NEUROLOGICAL:  negative    PMH  Past Medical History:   Diagnosis Date    Arthritis     Dental crowns present        [address] [place]  Past Surgical History:   Procedure Laterality Date    COLONOSCOPY      CYST REMOVAL Right     arm    CYST REMOVAL Left     abod left eye    KNEE ARTHROSCOPY Left 11/3/15    with partial lateral meniscectomy    LUMBAR FUSION Right 11/24/2020    RIGHT L4-5 LATERAL LUMBAR INTERBODY FUSION WITH PEDICLE SCREW FIXATION performed by Lisa Henderson MD at [address] [place] [address] History  Social History     Socioeconomic History    Marital status:      Spouse name: Not on file    Number of children: Not on file    Years of education: Not on file    Highest education level: Not on file   Occupational History    Occupation: Retired   Social Needs    Financial resource strain: Not on file    Food insecurity     Worry: Not on file     Inability: Not on file   [employer] needs     Medical: Not on file     Non-medical: Not on file   Tobacco Use    Smoking status: Never Smoker    Smokeless tobacco: Never Used   Substance and Sexual Activity    Alcohol use: No     Alcohol/week: 0.0 standard drinks     Comment: rarely    Drug use: No    Sexual activity: Not on file   Lifestyle    Physical activity     Days per week: Not on file     Minutes per session: Not on file    Stress: Not on file   Relationships    Social connections     Talks on phone: Not on file     Gets together: Not on file     Attends Zoroastrian service: Not on file     Active member of club or organization: Not on file     Attends meetings of clubs or organizations: Not on file     Relationship status: Not on file    Intimate partner violence     Fear of current or ex partner: Not on file     Emotionally abused: Not on file     Physically abused: Not on file     Forced sexual activity: Not on file   Other Topics Concern    Not on file   Social History Narrative    Not on file       Family History:       Problem Relation Age of Onset    Anesth Problems Other     Arthritis Other     Heart Disease Other     High Blood Pressure Other     Stroke Other        Allergy: No Known Allergies    PHYSICAL EXAM:  VITALS:  /80 (Site: Right Upper Arm, Position: Sitting, Cuff Size: Medium Adult)   Pulse 98   Temp 97.1 °F (36.2 °C) (Temporal)   Resp 16   Ht 5' 5\" (1.651 m)   Wt 115 lb 12.8 oz (52.5 kg)   BMI 19.27 kg/m²     CONSTITUTIONAL:  alert, no apparent distress and normal weight  EYES:  sclera clear  ENT:  normocepalic, without obvious abnormality  NECK:  supple, symmetrical, trachea midline and no carotid bruits  LUNGS:  clear to auscultation  CARDIOVASCULAR:  regular rate and rhythm and no murmur noted  ABDOMEN:  no scars, normal bowel sounds, soft, non-distended, non-tender, voluntary guarding absent, no masses palpated and hernia absent  MUSCULOSKELETAL:  0+ pitting edema lower extremities  NEUROLOGIC:  Mental Status Exam:  Level of Alertness:   awake  Orientation: person, place, time  SKIN:  Over the right flank, the incision is healing well, previous erythema has resolved. DATA:    Radiology Review:       Findings: Interval placement of a percutaneous drainage catheter into a right psoas collection with interval decrease in size of the previously noted right psoas abscess. A trace of fluid remains within the right psoas measuring approximately 10 mm.       Fluid and gas collection is noted involving the lateral aspect of the abdominal wall on the right. This measures 8.2 x 2.7 x 10.7 cm. Fluid is noted dependently within the collection with gas in its nondependent aspect. Fluid and gas extends to traverse    the oblique musculature of the right lateral abdominal wall, with thickening and edema of the deep muscular tissues noted. Fluid and gas has now tract superficial to the superior oblique muscle measuring 3.3 x 4.1 cm. Fluid and gas locules are also noted    within the retroperitoneum on the right, tiny, as seen on image 60 of series 601.       Gas tracks interpose between the oblique musculature anteriorly on the right and along the transversalis fascia. Gas also tracks within the preperitoneal soft tissues cranially just superficial to the anterior aspect of the left hepatic lobe, as noted on    image 19 of series 601. IMPRESSION/RECOMMENDATIONS:    The patient is s/p perc drainage of a post operative retroperitoneal abscess following back surgery. She is doing well at home with no significant output from the drains over the past 3-4 days. These drain were removed. I will see her back in the office on a PRN basis.      Ankur López

## 2021-01-11 LAB
ALBUMIN SERPL-MCNC: 4 G/DL (ref 3.5–5.7)
ALP BLD-CCNC: 92 IU/L (ref 35–135)
ALT SERPL-CCNC: 22 IU/L (ref 10–60)
ANION GAP SERPL CALCULATED.3IONS-SCNC: 6 MMOL/L (ref 6–18)
AST SERPL-CCNC: 24 IU/L (ref 10–40)
BASOPHILS ABSOLUTE: 0.1 THOU/MCL (ref 0–0.2)
BASOPHILS ABSOLUTE: 1 %
BILIRUB SERPL-MCNC: 0.4 MG/DL (ref 0–1.2)
BUN BLDV-MCNC: 30 MG/DL (ref 8–26)
C-REACTIVE PROTEIN WIDE RANGE: <5 MG/L
CALCIUM SERPL-MCNC: 10.1 MG/DL (ref 8.5–10.4)
CHLORIDE BLD-SCNC: 98 MEQ/L (ref 98–111)
CO2: 31 MMOL/L (ref 21–31)
CREAT SERPL-MCNC: 0.51 MG/DL (ref 0.6–1.2)
EOSINOPHILS ABSOLUTE: 0.2 THOU/MCL (ref 0.03–0.45)
EOSINOPHILS RELATIVE PERCENT: 2 %
GFR AFRICAN AMERICAN: 110 ML/MIN/1.73 M2
GFR NON-AFRICAN AMERICAN: 95 ML/MIN/1.73 M2
GLUCOSE BLD-MCNC: 100 MG/DL (ref 70–99)
HCT VFR BLD CALC: 35.3 % (ref 36–46)
HEMOGLOBIN: 11.8 G/DL (ref 12–15.2)
LYMPHOCYTES ABSOLUTE: 2.3 THOU/MCL (ref 1–4)
LYMPHOCYTES RELATIVE PERCENT: 33 %
MCH RBC QN AUTO: 29.9 PG (ref 27–33)
MCHC RBC AUTO-ENTMCNC: 33.5 G/DL (ref 32–36)
MCV RBC AUTO: 89.2 FL (ref 82–97)
MONOCYTES # BLD: 8 %
MONOCYTES ABSOLUTE: 0.6 THOU/MCL (ref 0.2–0.9)
NEUTROPHILS ABSOLUTE: 4 THOU/MCL (ref 1.8–7.7)
PDW BLD-RTO: 14.4 % (ref 12.3–17)
PLATELET # BLD: 248 THOU/MCL (ref 140–375)
PMV BLD AUTO: 7.5 FL (ref 7.4–11.5)
POTASSIUM SERPL-SCNC: 4.5 MEQ/L (ref 3.6–5.1)
RBC # BLD: 3.96 MIL/MCL (ref 3.8–5.2)
SEDIMENTATION RATE, ERYTHROCYTE: 23 MM/HR (ref 0–30)
SEG NEUTROPHILS: 56 %
SODIUM BLD-SCNC: 135 MEQ/L (ref 135–145)
TOTAL PROTEIN: 6.9 G/DL (ref 6–8)
WBC # BLD: 7 THOU/MCL (ref 3.6–10.5)

## 2021-01-12 ENCOUNTER — CARE COORDINATION (OUTPATIENT)
Dept: CASE MANAGEMENT | Age: 71
End: 2021-01-12

## 2021-01-12 NOTE — CARE COORDINATION
[address] [address] Follow Up Call Qualifying Dx. Spinal Fusion    2021      Patient Name: Haily Griffith         Patient : 1950     Discharge Date: 20    RARS: Readmission Risk Score: Readmission Risk Score: 9    PCP: Stephanie Orourke MD                   Spoke withCarissa Eden    Incision:  4 healing puncture sites, drain sites are sore, ice applications occasionally. Edema:denies    Pain: denies    Pain Control Interventions: occasional OTC acetaminophen     Bowels/Appetite: good    Ability to care for basic needs; Shower, meal preparation: ok    Home Care/Out Patient [address] for PICC line care and IV medications. Medication Changes/Questions: denies    Agreeable to ongoing Care Transition Communications: yes    No future appointments. Care Transitions will continue to follow.

## 2021-01-15 ENCOUNTER — TELEPHONE (OUTPATIENT)
Dept: INFECTIOUS DISEASES | Age: 71
End: 2021-01-15

## 2021-01-15 NOTE — TELEPHONE ENCOUNTER
Spoke with patient to see how she is doing on IV infusion. Patient stated she is doing good. She is not having any pain only a little soreness. The incision site has healed. She has not has any issue with IV infusions.

## 2021-01-19 LAB
ALBUMIN SERPL-MCNC: 3.6 G/DL (ref 3.5–5.7)
ALP BLD-CCNC: 73 IU/L (ref 35–135)
ALT SERPL-CCNC: 19 IU/L (ref 10–60)
ANION GAP SERPL CALCULATED.3IONS-SCNC: 9 MMOL/L (ref 6–18)
AST SERPL-CCNC: 26 IU/L (ref 10–40)
BASOPHILS ABSOLUTE: 0.1 THOU/MCL (ref 0–0.2)
BASOPHILS ABSOLUTE: 1 %
BILIRUB SERPL-MCNC: 0.4 MG/DL (ref 0–1.2)
BUN BLDV-MCNC: 22 MG/DL (ref 8–26)
C-REACTIVE PROTEIN WIDE RANGE: <5 MG/L
CALCIUM SERPL-MCNC: 8.8 MG/DL (ref 8.5–10.4)
CHLORIDE BLD-SCNC: 105 MEQ/L (ref 98–111)
CO2: 24 MMOL/L (ref 21–31)
CREAT SERPL-MCNC: 0.44 MG/DL (ref 0.6–1.2)
EOSINOPHILS ABSOLUTE: 0.2 THOU/MCL (ref 0.03–0.45)
EOSINOPHILS RELATIVE PERCENT: 3 %
GFR AFRICAN AMERICAN: 116 ML/MIN/1.73 M2
GFR NON-AFRICAN AMERICAN: 100 ML/MIN/1.73 M2
GLUCOSE BLD-MCNC: 73 MG/DL (ref 70–99)
HCT VFR BLD CALC: 33.7 % (ref 36–46)
HEMOGLOBIN: 11.3 G/DL (ref 12–15.2)
LYMPHOCYTES ABSOLUTE: 2 THOU/MCL (ref 1–4)
LYMPHOCYTES RELATIVE PERCENT: 28 %
MCH RBC QN AUTO: 30.2 PG (ref 27–33)
MCHC RBC AUTO-ENTMCNC: 33.7 G/DL (ref 32–36)
MCV RBC AUTO: 89.7 FL (ref 82–97)
MONOCYTES # BLD: 8 %
MONOCYTES ABSOLUTE: 0.6 THOU/MCL (ref 0.2–0.9)
NEUTROPHILS ABSOLUTE: 4.3 THOU/MCL (ref 1.8–7.7)
PDW BLD-RTO: 14.3 % (ref 12.3–17)
PLATELET # BLD: 236 THOU/MCL (ref 140–375)
PMV BLD AUTO: 7.5 FL (ref 7.4–11.5)
POTASSIUM SERPL-SCNC: 4.1 MEQ/L (ref 3.6–5.1)
RBC # BLD: 3.76 MIL/MCL (ref 3.8–5.2)
SEDIMENTATION RATE, ERYTHROCYTE: 11 MM/HR (ref 0–30)
SEG NEUTROPHILS: 60 %
SODIUM BLD-SCNC: 138 MEQ/L (ref 135–145)
TOTAL PROTEIN: 6.2 G/DL (ref 6–8)
WBC # BLD: 7.2 THOU/MCL (ref 3.6–10.5)

## 2021-01-26 ENCOUNTER — CARE COORDINATION (OUTPATIENT)
Dept: CASE MANAGEMENT | Age: 71
End: 2021-01-26

## 2021-01-26 NOTE — CARE COORDINATION
[facility] Transitions Follow Up Call    2021    Patient: Marysue Epley  Patient : 1950   MRN: <[id number]>  Reason for Admission:   Discharge Date: 20 RARS: Readmission Risk Score: 9         Spoke with: Patient, [id number] [facility] Transitions Subsequent and Final Call    Subsequent and Final Calls  Do you have any ongoing symptoms?: Yes  -patient reports \"some soreness\" at incision sites  Do you have any questions related to your medications?: No  Do you have any needs or concerns that I can assist you with?: No  Identified Barriers: None  Care Transitions Interventions  No Identified Needs    Patient is pleasant in conversation.   -denies fever/chills  -reports she is gradually improving   -had f/u with Surgeon last week; reports healing well   -independent with ambulation   -reports good appetite and normal bladder/bowel elimination     Emotional support provided; will continue to follow. Follow Up  No future appointments.     Jerome Sanders RN

## 2021-02-09 ENCOUNTER — CARE COORDINATION (OUTPATIENT)
Dept: CASE MANAGEMENT | Age: 71
End: 2021-02-09

## 2021-02-09 NOTE — CARE COORDINATION
[facility] Transitions Follow Up Call    2021    Patient: Pedro Luis Browning  Patient : 1950   MRN: <[id number]>  Reason for Admission:   Discharge Date: 20 RARS: Readmission Risk Score: 9      Attempted to reach patient by phone for follow up; BPCI-A. HIPAA compliant message left on patient's voicemail; CTN contact information provided.         Bishop Johnathon RN

## 2021-02-16 ENCOUNTER — CARE COORDINATION (OUTPATIENT)
Dept: CASE MANAGEMENT | Age: 71
End: 2021-02-16

## 2021-02-16 NOTE — CARE COORDINATION
[facility] Transitions Follow Up Call    2021    Patient: Edmundo Enriquez  Patient : 1950   MRN: <[id number]>  Reason for Admission:   Discharge Date: 20 RARS: Readmission Risk Score: 9       Attempted to reach patient by phone regarding follow up; BPCI-A. HIPAA compliant message left on patient's voicemail; CTN contact information provided.      Birgit Dill RN

## 2021-02-26 ENCOUNTER — CARE COORDINATION (OUTPATIENT)
Dept: CASE MANAGEMENT | Age: 71
End: 2021-02-26

## 2021-02-26 NOTE — CARE COORDINATION
[facility] Transitions Follow Up Call    2021    Patient: Neetu Paniagua  Patient : 1950   MRN: <[id number]>    Discharge Date: 20 RARS: Readmission Risk Score: 9       Attempted to contact patient for final BPCI-A call. Contact information left to  requesting call back at the earliest convenience. Follow Up  No future appointments.     Tabitha Barajas RN

## 2023-03-21 NOTE — PROGRESS NOTES
Internal Medicine Progress Note        PCP: Rosaura Aquino MD    Date of Admission: 12/7/2020    Chief Complaint: Abdominal pain, fever, leg swelling    Hospital Course: 79year old female with PMHx of arthritis, spinal stenosis with neurogenic claudication, and lumbar spondylolisthesis who underwent right L4-5 lateral lumbar [facility] screw [facility] Presented to [facility] ED c/o 7 day h/o fevers (Tmax 103), abdominal pain/swelling and bilateral leg swelling, that have been getting progressively worse. Patient states  has been taking care of surgical wounds daily. Denied any skin changes or discoloration. Endorses difficulty with urination, with decreased frequency as well as decreased output when she does go.      In [facility] ED, temperature was 99.6, VSS. Labs significant for Na of 130, , WBC 11.1. Lactic acid wnl. CT abd/pelvis at [facility] ED revealed large complex right iliopsoas abscess and decision was made to transfer patient to [facility] for further w/u.     Subjective:     No acute events overnight. Patient seen this morning at bedside. Tmax 100.5 in past 24 hours. VSS. Tolerating pain. Patient is having small, loose BM daily. Denies any N/V/D, chest pain, palpitations or SOB. UOP adequate. Repeat CT scan today to evaluate for second abscess cavity in peritoneal space. Resume diet after CT scan.     Medications:  Reviewed    Infusion Medications   Scheduled Medications    polyethylene glycol  17 g Oral Daily    potassium chloride  20 mEq Oral Once    sodium chloride flush  10 mL Intravenous 2 times per day    [Held by provider] enoxaparin  40 mg Subcutaneous Daily    vancomycin  750 mg Intravenous Q12H    docusate sodium  100 mg Oral BID    sodium chloride flush  10 mL Intravenous 2 times per day    cefepime  2 g Intravenous Q8H     PRN Meds: sodium chloride flush, sodium chloride flush, acetaminophen **OR** [DISCONTINUED] Pt left a voicemail saying that she was able to get in on 03/28 for her MRI, however pt is going out of town on Friday. Pt mentioned that Dr Linn could possibly help and get her in sooner.    --  2.8     No results for input(s): AST, ALT, BILIDIR, BILITOT, ALKPHOS in the last 72 hours. Recent Labs     20  0600 12/10/20  0847   INR 1.12 1.17*     No results for input(s): Hung Oiler in the last 72 hours. Urinalysis:    No results found for: Easter Fermo, BACTERIA, RBCUA, BLOODU, SPECGRAV, [address]    Radiology:  CT DRAINAGE HEMATOMA/SEROMA/FLUID COLLECTION   Final Result   Impression: Technically successful CT-guided percutaneous drain placement into a postsurgical right psoas abscess. CT ABDOMEN PELVIS W IV CONTRAST Additional Contrast? Oral    (Results Pending)     Imagin/7: CT abdomen/pelvis - [facility] ED  There is ectopic gas identified along the right lateral abdominal wall musculature. There is a elliptical fluid collection in the right lateral abdominal wall with air-fluid level. The collection measures approximately 8.5 x 2.4 cm in greatest transaxial dimensions. There is a complex rim-enhancing fluid collection in the right ileal psoas muscle consistent with an abscess. The collection measures approximately 4.1 x 2.6 cm on transaxial dimensions and up to 8.3 cm in craniocaudad dimension. There are surgical changes of the lumbosacral spine with posterior surgical fusion at L4-5 with intervertebral disc prosthesis. Ectopic gas pockets identified at the disc space at L4-5 that may be postsurgical in nature. There is high dense material extending from the right iliopsoas region to the posterior lateral aspect of the disc space that may be related to extruded material from the surgical prosthesis. Ectopic gas also identified extending into the epicardial region of the anterior lower chest and right posterior retroperitoneum. 1. Large complex right iliopsoas abscess as described above.   2. Fluid collection with air-fluid level associated with large amount of ectopic gas along the right lateral abdominal wall.     Assessment/Plan:     Rose Melendez, 70 y.o. female w/ PMHx of arthritis, spinal stenosis with neurogenic claudication, and lumbar spondylolisthesis who underwent a right L4-5 lateral lumbar fusion with pedicle screw fixation on 11/24/2. Admitted for a large complex right iliopsoas abscess. Active Hospital Problems    Diagnosis Date Noted    Deep incisional surgical site infection [T81.42XA] 12/08/2020    Psoas abscess [facility]) [K68.12] 12/07/2020    S/P lumbar fusion [Z98.1] 11/24/2020     Right iliopsoas abscess s/p CT drainage   Recent surgery with incision on right side. 1 week hx of spiking fevers (Tmax 103), abdominal pain and swelling, fatigue, malaise, pain with ambulation. CT abd/pelvis showed complex large iliopsoas abscess. - Neurosurgery consulted, appreciate recs   - Continue cefepime 2g IV q8h and vancomycin 750 mg IV q12h  - Abscess drained 12/8   - Gram stain: 4+ WBC's, 2+ GNR.  Surgical cx NGTD  - ID consulted for abx management  - PICC placed 12/09   - Pain control as needed  - PTOT following - 18/24, 18/24, respectively   - Repeat CT scan to evaluate for second abscess cavity in the preperitoneal space  - Resume diet after CT scan today   - Strict I/O's, monitor UOP  - General surgery following    - Continue serial abd exams and monitor pain   - If pain worsens, consider CT scan with oral contrast      s/p right L4-L5 lateral lumbar fusion with pedicle screw fixation   - Neurosurgery consulted    DVT Prophylaxis: Lovenox held for scan   Diet: Diet NPO Effective Now  Code Status: Full Code    PT/OT Eval Status: Ongoing   Dispo - Inpt, dispo with clinical improvement    I will discuss the patient and attending, MD Katarzyna Ho, MS4 as scribe for Dr. Justine Hamilton MD

## 2024-02-11 NOTE — PLAN OF CARE
Problem: Falls - Risk of:  Goal: Will remain free from falls  Description: Will remain free from falls  11/27/2020 1256 by Ilana Maldonado RN  Outcome: Ongoing  Note: Patient high fall risk and is up with assist x1 with the walker and gait belt. Patient alert and oriented x4, non skid socks on, bed in lowest position and locked, side rails up x2, call light and belongings within reach, bed alarm on for safety, fall sign posted. Will continue to monitor. Problem: Pain:  Goal: Pain level will decrease  Description: Pain level will decrease  11/27/2020 1256 by Ilana Maldonado RN  Outcome: Ongoing  Note: Pt complains of mild-moderate pain to surgical sites. PRN/scheduled pain medication and muscle relaxer administered. Ice applied. Will continue to monitor. Normocytic anemia Right knee pain

## 2025-01-13 NOTE — CARE COORDINATION
Perfect[facility]ed Dr. Juan Barker about [facility] for the following antibiotics:  Vanco ($100.00/per week), Dapto ($500.00 per week) after her $498.00 deductible is met. Awaiting his decision on IV med. Also, contacted [facility] to be sure they were following case. Electronically signed by Khalida Valencia RN on 12/9/2020 at 10:17 AM     Addendum: Dr. Juan Barker returned text to this CM and said he would be in this afternoon to decide on antibiotic. Dr. Juan Barker is in clinic at this time. Also, received confirmation of [facility] following for home care.  Electronically signed by Khalida Valencia RN on 12/9/2020 at 12:01 PM DISCHARGE

## (undated) DEVICE — GOWN,SIRUS,POLYRNF,BRTHSLV,XL,30/CS: Brand: MEDLINE

## (undated) DEVICE — NEURO SPONGES: Brand: DEROYAL

## (undated) DEVICE — 3M™ TEGADERM™ TRANSPARENT FILM DRESSING FRAME STYLE, 1626W, 4 IN X 4-3/4 IN (10 CM X 12 CM), 50/CT 4CT/CASE: Brand: 3M™ TEGADERM™

## (undated) DEVICE — KIT DIL PRB K WIRE DISP FOR EXTRM LUM INTBDY FUS

## (undated) DEVICE — PLATE ES AD W 9FT CRD 2

## (undated) DEVICE — TOOL 14MH30 LEGEND 14CM 3MM: Brand: MIDAS REX ™

## (undated) DEVICE — GARMENT,MEDLINE,DVT,INT,CALF,MED, GEN2: Brand: MEDLINE

## (undated) DEVICE — APPLICATOR PREP 26ML 0.7% IOD POVACRYLEX 74% ISO ALC ST

## (undated) DEVICE — SPONGE,NEURO,0.5"X3",XR,STRL,LF,10/PK: Brand: MEDLINE

## (undated) DEVICE — SOLUTION IV 1000ML 0.9% SOD CHL

## (undated) DEVICE — BLANKET WRM W40.2XL55.9IN IORT LO BODY + MISTRAL AIR

## (undated) DEVICE — MODULE EMG W/ NVM5 HARN 2 NEEDLE/NEUROVISION 2 SLD GEL

## (undated) DEVICE — DRESSING FOAM 0.75IN 1.5MM H DISK CHG IMPREG AEGIS

## (undated) DEVICE — COVER LT HNDL BLU PLAS

## (undated) DEVICE — BLANKET WRM W29.9XL79.1IN UP BODY FORC AIR MISTRAL-AIR

## (undated) DEVICE — STAPLER SKIN H3.9MM WIRE DIA0.58MM CRWN 6.9MM 35 STPL ROT

## (undated) DEVICE — APPLICATOR MEDICATED 26 CC SOLUTION HI LT ORNG CHLORAPREP

## (undated) DEVICE — COVER LT HNDL CAM BLU DISP W/ SURG CTRL

## (undated) DEVICE — BLADE ES L4IN INSUL EDGE

## (undated) DEVICE — LOTION PREP REMV 4OZ IODO CLR TINC OF BENZ DURAPREP

## (undated) DEVICE — LAMINECTOMY PK

## (undated) DEVICE — DRAPE MICSCP W132XL406CM LENS DIA68MM W VARI LENS2 FOR LEICA

## (undated) DEVICE — SUTURE VCRL SZ 3-0 L18IN ABSRB UD L26MM SH 1/2 CIR J864D

## (undated) DEVICE — SURE SET-DOUBLE BASIN-LF: Brand: MEDLINE INDUSTRIES, INC.

## (undated) DEVICE — COVER,TABLE,HEAVY DUTY,77"X90",STRL: Brand: MEDLINE

## (undated) DEVICE — CABLE BPLR L12FT FLYING LD DISPOSABLE

## (undated) DEVICE — E-Z CLEAN, NON-STICK, PTFE COATED, ELECTROSURGICAL BLADE ELECTRODE, MODIFIED EXTENDED INSULATION, 2.5 INCH (6.35 CM): Brand: MEGADYNE

## (undated) DEVICE — SYRINGE IRRIG 60ML SFT PLIABLE BLB EZ TO GRP 1 HND USE W/

## (undated) DEVICE — DRAPE,ORTHOMAX,BODY,SPLIT: Brand: MEDLINE

## (undated) DEVICE — CATHETER IV 14GA L5.25IN PERIPH ORNG FEP POLYMER 3 BVL

## (undated) DEVICE — JEWISH HOSPITAL TURNOVER KIT: Brand: MEDLINE INDUSTRIES, INC.

## (undated) DEVICE — SUTURE MCRYL SZ 4-0 L27IN ABSRB UD L19MM PS-2 1/2 CIR PRIM Y426H

## (undated) DEVICE — GLOVE SURG SZ 65 THK118MIL BLK LTX FREE POLYISOPRENE BEAD

## (undated) DEVICE — UNDERGLOVE SURG SZ 8.5 FNGR THK0.21MIL GRN LTX BEAD CUF

## (undated) DEVICE — ORTHO PRE OP PACK: Brand: MEDLINE INDUSTRIES, INC.

## (undated) DEVICE — EYE PROTECTOR FOAM MEDICHOICE

## (undated) DEVICE — SPHERE EYE 1 MRK GUIDANCE PASS STEALTHSTATION 1PK/EA

## (undated) DEVICE — C-ARMOR C-ARM EQUIPMENT COVERS CLEAR STERILE UNIVERSAL FIT 12 PER CASE: Brand: C-ARMOR

## (undated) DEVICE — KIT SPNL ACC MAXCESS IV

## (undated) DEVICE — 3M™ TEGADERM™ TRANSPARENT FILM DRESSING FRAME STYLE, 1627, 4 IN X 10 IN (10 CM X 25 CM), 20/CT 4CT/CASE: Brand: 3M™ TEGADERM™

## (undated) DEVICE — Device

## (undated) DEVICE — SUTURE VCRL SZ 0 L18IN ABSRB UD L36MM CT-1 1/2 CIR J840D

## (undated) DEVICE — BASIC DOUBLE BASIN 2-LF: Brand: MEDLINE INDUSTRIES, INC.

## (undated) DEVICE — SURGICAL SET UP - SURE SET: Brand: MEDLINE INDUSTRIES, INC.

## (undated) DEVICE — SHEET,DRAPE,53X77,STERILE: Brand: MEDLINE

## (undated) DEVICE — GLOVE SURG SZ 85 L12IN FNGR ORTHO 126MIL CRM LTX FREE

## (undated) DEVICE — CABLE LT DISP FOR MAXCESS SYS
Type: IMPLANTABLE DEVICE | Site: SPINE LUMBAR | Status: NON-FUNCTIONAL
Removed: 2020-11-24

## (undated) DEVICE — SUTURE VCRL SZ 2-0 L18IN ABSRB UD CT-1 L36MM 1/2 CIR J839D

## (undated) DEVICE — STERILE LATEX POWDER-FREE SURGICAL GLOVESWITH NITRILE AND EMOLLIENT COATINGS: Brand: PROTEXIS

## (undated) DEVICE — PAD,NON-ADHERENT,3X8,STERILE,LF,1/PK: Brand: MEDLINE

## (undated) DEVICE — SSC BONE WAX: Brand: SSC BONE WAX